# Patient Record
Sex: MALE | Race: WHITE | NOT HISPANIC OR LATINO | Employment: OTHER | URBAN - METROPOLITAN AREA
[De-identification: names, ages, dates, MRNs, and addresses within clinical notes are randomized per-mention and may not be internally consistent; named-entity substitution may affect disease eponyms.]

---

## 2020-01-10 ENCOUNTER — APPOINTMENT (OUTPATIENT)
Dept: GENERAL RADIOLOGY | Facility: HOSPITAL | Age: 45
End: 2020-01-10

## 2020-01-10 ENCOUNTER — TELEPHONE (OUTPATIENT)
Dept: ENDOCRINOLOGY | Facility: CLINIC | Age: 45
End: 2020-01-10

## 2020-01-10 ENCOUNTER — HOSPITAL ENCOUNTER (EMERGENCY)
Facility: HOSPITAL | Age: 45
Discharge: HOME | End: 2020-01-10
Attending: EMERGENCY MEDICINE | Admitting: EMERGENCY MEDICINE

## 2020-01-10 VITALS
DIASTOLIC BLOOD PRESSURE: 90 MMHG | WEIGHT: 208.34 LBS | BODY MASS INDEX: 28.22 KG/M2 | SYSTOLIC BLOOD PRESSURE: 128 MMHG | TEMPERATURE: 97.8 F | HEIGHT: 72 IN | HEART RATE: 67 BPM | OXYGEN SATURATION: 98 % | RESPIRATION RATE: 16 BRPM

## 2020-01-10 DIAGNOSIS — M25.512 BILATERAL SHOULDER PAIN, UNSPECIFIED CHRONICITY: ICD-10-CM

## 2020-01-10 DIAGNOSIS — M25.511 BILATERAL SHOULDER PAIN, UNSPECIFIED CHRONICITY: ICD-10-CM

## 2020-01-10 DIAGNOSIS — E11.65 UNCONTROLLED TYPE 2 DIABETES MELLITUS WITH HYPERGLYCEMIA (HCC): Primary | ICD-10-CM

## 2020-01-10 LAB
ALBUMIN SERPL-MCNC: 4.4 G/DL (ref 3.5–5.2)
ALBUMIN/GLOB SERPL: 1.5 G/DL
ALP SERPL-CCNC: 93 U/L (ref 39–117)
ALT SERPL W P-5'-P-CCNC: 27 U/L (ref 1–41)
ANION GAP SERPL CALCULATED.3IONS-SCNC: 15 MMOL/L (ref 5–15)
AST SERPL-CCNC: 16 U/L (ref 1–40)
BASOPHILS # BLD AUTO: 0.1 10*3/MM3 (ref 0–0.2)
BASOPHILS NFR BLD AUTO: 0.8 % (ref 0–1.5)
BILIRUB SERPL-MCNC: 0.4 MG/DL (ref 0.2–1.2)
BUN BLD-MCNC: 13 MG/DL (ref 6–20)
BUN/CREAT SERPL: 15.7 (ref 7–25)
CALCIUM SPEC-SCNC: 9 MG/DL (ref 8.6–10.5)
CHLORIDE SERPL-SCNC: 97 MMOL/L (ref 98–107)
CO2 SERPL-SCNC: 25 MMOL/L (ref 22–29)
CREAT BLD-MCNC: 0.83 MG/DL (ref 0.76–1.27)
D DIMER PPP FEU-MCNC: <0.17 MCGFEU/ML (ref 0.17–0.59)
DEPRECATED RDW RBC AUTO: 38.5 FL (ref 37–54)
EOSINOPHIL # BLD AUTO: 0.2 10*3/MM3 (ref 0–0.4)
EOSINOPHIL NFR BLD AUTO: 2.9 % (ref 0.3–6.2)
ERYTHROCYTE [DISTWIDTH] IN BLOOD BY AUTOMATED COUNT: 12.5 % (ref 12.3–15.4)
ERYTHROCYTE [SEDIMENTATION RATE] IN BLOOD: 5 MM/HR (ref 0–15)
GFR SERPL CREATININE-BSD FRML MDRD: 100 ML/MIN/1.73
GLOBULIN UR ELPH-MCNC: 2.9 GM/DL
GLUCOSE BLD-MCNC: 467 MG/DL (ref 65–99)
HCT VFR BLD AUTO: 44.4 % (ref 37.5–51)
HGB BLD-MCNC: 15.8 G/DL (ref 13–17.7)
LYMPHOCYTES # BLD AUTO: 2.5 10*3/MM3 (ref 0.7–3.1)
LYMPHOCYTES NFR BLD AUTO: 38 % (ref 19.6–45.3)
MCH RBC QN AUTO: 31.5 PG (ref 26.6–33)
MCHC RBC AUTO-ENTMCNC: 35.5 G/DL (ref 31.5–35.7)
MCV RBC AUTO: 88.7 FL (ref 79–97)
MONOCYTES # BLD AUTO: 0.5 10*3/MM3 (ref 0.1–0.9)
MONOCYTES NFR BLD AUTO: 7.3 % (ref 5–12)
NEUTROPHILS # BLD AUTO: 3.3 10*3/MM3 (ref 1.7–7)
NEUTROPHILS NFR BLD AUTO: 51 % (ref 42.7–76)
NRBC BLD AUTO-RTO: 0.1 /100 WBC (ref 0–0.2)
PLATELET # BLD AUTO: 171 10*3/MM3 (ref 140–450)
PMV BLD AUTO: 8.2 FL (ref 6–12)
POTASSIUM BLD-SCNC: 4 MMOL/L (ref 3.5–5.2)
PROT SERPL-MCNC: 7.3 G/DL (ref 6–8.5)
RBC # BLD AUTO: 5 10*6/MM3 (ref 4.14–5.8)
SODIUM BLD-SCNC: 137 MMOL/L (ref 136–145)
TROPONIN T SERPL-MCNC: <0.01 NG/ML (ref 0–0.03)
WBC NRBC COR # BLD: 6.5 10*3/MM3 (ref 3.4–10.8)

## 2020-01-10 PROCEDURE — 99283 EMERGENCY DEPT VISIT LOW MDM: CPT

## 2020-01-10 PROCEDURE — 85652 RBC SED RATE AUTOMATED: CPT | Performed by: EMERGENCY MEDICINE

## 2020-01-10 PROCEDURE — 93005 ELECTROCARDIOGRAM TRACING: CPT | Performed by: EMERGENCY MEDICINE

## 2020-01-10 PROCEDURE — 84484 ASSAY OF TROPONIN QUANT: CPT | Performed by: EMERGENCY MEDICINE

## 2020-01-10 PROCEDURE — 85379 FIBRIN DEGRADATION QUANT: CPT | Performed by: EMERGENCY MEDICINE

## 2020-01-10 PROCEDURE — 85025 COMPLETE CBC W/AUTO DIFF WBC: CPT | Performed by: EMERGENCY MEDICINE

## 2020-01-10 PROCEDURE — 71046 X-RAY EXAM CHEST 2 VIEWS: CPT

## 2020-01-10 PROCEDURE — 80053 COMPREHEN METABOLIC PANEL: CPT | Performed by: EMERGENCY MEDICINE

## 2020-01-10 RX ADMIN — SODIUM CHLORIDE 1000 ML: 900 INJECTION, SOLUTION INTRAVENOUS at 10:50

## 2020-01-10 NOTE — TELEPHONE ENCOUNTER
PATIENT LEFT A VM THAT HE NEEDED TO SCHEDULE AN APPOINTMENT. CHECKED THE RECORDS AND HE ISN'T A CURRENT PATIENT. HOWEVER HE WAS IN THE ER AND DISCHARGE SUMMARY INDICATED TO SCHEDULE A 1WK F/U WITH DR. LUJAN. SO DR. LUJAN DID SEE HIM IN THE ER. I CALLED PATIENT BACK AND LEFT A VM TO CALL AND WE'LL GET HIM SCHEDULED.

## 2020-01-10 NOTE — ED PROVIDER NOTES
Subjective   History of Present Illness  Context: 44-year-old male presents with shoulder pain.  He states he has had this for a couple of months.  He complains that he had some pain in his chest for a couple of days.  He has had no nausea vomiting.  He states he has been more fatigued.  He states he urinates frequently has to get up several times during the night.  He also has had cough with some sputum production.  He has had no fever.  No diaphoresis.  Location: Chest and shoulders  Quality:  Duration: Chest 2 days shoulders 2 months  Timing: Constant  Severity: 4 out of 10   Modifying factors: None reported  Associated signs and symptoms: Frequent urination    Review of Systems  Negative for fever headache dizziness nausea vomiting diarrhea diaphoresis positive for cough with sputum production.  Polyuria, reports he does have some pain in his feet at time  No past medical history on file.  States prediabetic.  Hypertension  No Known Allergies    No past surgical history on file.    No family history on file.    Social History     Socioeconomic History   • Marital status:      Spouse name: Not on file   • Number of children: Not on file   • Years of education: Not on file   • Highest education level: Not on file     Patient had been on lisinopril and metformin in the past but no medications currently      Objective   Physical Exam  44-year-old male awake alert.  Generally well-developed well-nourished.  Pupils equal round to light.  Oropharynx clear mucous membranes moist neck supple chest clear equal breath sounds.  Cardiovascular regular rate and rhythm abdomen soft nontender.  Examination extremities he has full range of motion without complaints of pain.  He has no shoulder discomfort with movement.  Neuro exam without focal findings noted.  Skin without rash  Procedures           ED Course      Results for orders placed or performed during the hospital encounter of 01/10/20   Comprehensive Metabolic  Panel   Result Value Ref Range    Glucose 467 (C) 65 - 99 mg/dL    BUN 13 6 - 20 mg/dL    Creatinine 0.83 0.76 - 1.27 mg/dL    Sodium 137 136 - 145 mmol/L    Potassium 4.0 3.5 - 5.2 mmol/L    Chloride 97 (L) 98 - 107 mmol/L    CO2 25.0 22.0 - 29.0 mmol/L    Calcium 9.0 8.6 - 10.5 mg/dL    Total Protein 7.3 6.0 - 8.5 g/dL    Albumin 4.40 3.50 - 5.20 g/dL    ALT (SGPT) 27 1 - 41 U/L    AST (SGOT) 16 1 - 40 U/L    Alkaline Phosphatase 93 39 - 117 U/L    Total Bilirubin 0.4 0.2 - 1.2 mg/dL    eGFR Non African Amer 100 >60 mL/min/1.73    Globulin 2.9 gm/dL    A/G Ratio 1.5 g/dL    BUN/Creatinine Ratio 15.7 7.0 - 25.0    Anion Gap 15.0 5.0 - 15.0 mmol/L   D-dimer, Quantitative   Result Value Ref Range    D-Dimer, Quantitative <0.17 (L) 0.17 - 0.59 MCGFEU/mL   Troponin   Result Value Ref Range    Troponin T <0.010 0.000 - 0.030 ng/mL   Sedimentation Rate   Result Value Ref Range    Sed Rate 5 0 - 15 mm/hr   CBC Auto Differential   Result Value Ref Range    WBC 6.50 3.40 - 10.80 10*3/mm3    RBC 5.00 4.14 - 5.80 10*6/mm3    Hemoglobin 15.8 13.0 - 17.7 g/dL    Hematocrit 44.4 37.5 - 51.0 %    MCV 88.7 79.0 - 97.0 fL    MCH 31.5 26.6 - 33.0 pg    MCHC 35.5 31.5 - 35.7 g/dL    RDW 12.5 12.3 - 15.4 %    RDW-SD 38.5 37.0 - 54.0 fl    MPV 8.2 6.0 - 12.0 fL    Platelets 171 140 - 450 10*3/mm3    Neutrophil % 51.0 42.7 - 76.0 %    Lymphocyte % 38.0 19.6 - 45.3 %    Monocyte % 7.3 5.0 - 12.0 %    Eosinophil % 2.9 0.3 - 6.2 %    Basophil % 0.8 0.0 - 1.5 %    Neutrophils, Absolute 3.30 1.70 - 7.00 10*3/mm3    Lymphocytes, Absolute 2.50 0.70 - 3.10 10*3/mm3    Monocytes, Absolute 0.50 0.10 - 0.90 10*3/mm3    Eosinophils, Absolute 0.20 0.00 - 0.40 10*3/mm3    Basophils, Absolute 0.10 0.00 - 0.20 10*3/mm3    nRBC 0.1 0.0 - 0.2 /100 WBC     Xr Chest 2 View    Result Date: 1/10/2020  No acute cardiopulmonary findings.  Electronically Signed By-Clayton Corbett On:1/10/2020 10:02 AM This report was finalized on [id number] by  Clayton Corbett,  ".    Medications   sodium chloride 0.9 % bolus 1,000 mL (1,000 mL Intravenous New Bag 1/10/20 1050)     /91   Pulse 81   Temp 97.7 °F (36.5 °C) (Oral)   Resp 17   Ht 182.9 cm (72\")   Wt 94.5 kg (208 lb 5.4 oz)   SpO2 97%   BMI 28.26 kg/m²                                            MDM  Chart review: No recent visits.  He had been on lisinopril several years ago  Comorbidity: As per past history  Differential: Musculoskeletal pain, cardiac etiology unlikely due to chronicity of pain.  Pneumonia, bronchitis  My EKG interpretation: Sinus rhythm without acute abnormality  Lab: Negative d-dimer negative troponin normal sed rate normal CBC compress metabolic panel essentially normal with exception of a glucose of 467, BUN 13 bicarb 25  Radiology: I reviewed chest x-ray.  No acute cardiopulmonary abnormality noted  Discussion/treatment: Patient was given a liter of IV saline.  He was discussed with Dr. Malcolm who recommended starting him on metformin 500 twice a day.  They can follow him up next week in the office.    Final diagnoses:   Uncontrolled type 2 diabetes mellitus with hyperglycemia (CMS/[facility])   Bilateral shoulder pain, unspecified chronicity            Reuben Pacheco MD  01/10/20 1113       Reuben Pacheco MD  01/10/20 1117    "

## 2020-01-16 PROBLEM — M19.90 ARTHRITIS: Status: ACTIVE | Noted: 2020-01-16

## 2020-01-16 PROBLEM — I10 HYPERTENSION: Status: ACTIVE | Noted: 2020-01-16

## 2020-01-16 RX ORDER — LISINOPRIL 20 MG/1
TABLET ORAL
COMMUNITY
Start: 2012-08-28 | End: 2020-01-30

## 2020-01-16 RX ORDER — MELOXICAM 15 MG/1
TABLET ORAL
COMMUNITY
Start: 2012-08-28 | End: 2020-01-30

## 2020-01-16 RX ORDER — TRAMADOL HYDROCHLORIDE 50 MG/1
TABLET ORAL
COMMUNITY
Start: 2012-11-26 | End: 2020-01-30

## 2020-01-30 ENCOUNTER — OFFICE VISIT (OUTPATIENT)
Dept: ENDOCRINOLOGY | Facility: CLINIC | Age: 45
End: 2020-01-30

## 2020-01-30 VITALS
HEIGHT: 72 IN | WEIGHT: 210 LBS | DIASTOLIC BLOOD PRESSURE: 70 MMHG | SYSTOLIC BLOOD PRESSURE: 115 MMHG | BODY MASS INDEX: 28.44 KG/M2 | OXYGEN SATURATION: 98 % | HEART RATE: 79 BPM

## 2020-01-30 DIAGNOSIS — E78.2 MIXED HYPERLIPIDEMIA: ICD-10-CM

## 2020-01-30 DIAGNOSIS — I10 ESSENTIAL HYPERTENSION: ICD-10-CM

## 2020-01-30 DIAGNOSIS — E11.42 DIABETIC PERIPHERAL NEUROPATHY (HCC): ICD-10-CM

## 2020-01-30 DIAGNOSIS — E11.65 UNCONTROLLED TYPE 2 DIABETES MELLITUS WITH HYPERGLYCEMIA (HCC): Primary | ICD-10-CM

## 2020-01-30 PROCEDURE — 99204 OFFICE O/P NEW MOD 45 MIN: CPT | Performed by: INTERNAL MEDICINE

## 2020-01-30 RX ORDER — ERGOCALCIFEROL (VITAMIN D2) 50 MCG
2000 CAPSULE ORAL DAILY
Qty: 100 CAPSULE | Refills: 4 | Status: ON HOLD | OUTPATIENT
Start: 2020-01-30 | End: 2023-02-06 | Stop reason: SDUPTHER

## 2020-01-30 RX ORDER — LOSARTAN POTASSIUM 25 MG/1
25 TABLET ORAL DAILY
COMMUNITY
Start: 2020-01-14 | End: 2022-02-22 | Stop reason: SDUPTHER

## 2020-01-30 RX ORDER — ATORVASTATIN CALCIUM 20 MG/1
20 TABLET, FILM COATED ORAL NIGHTLY
COMMUNITY
Start: 2020-01-14 | End: 2022-02-24 | Stop reason: SDUPTHER

## 2020-01-30 RX ORDER — INSULIN GLARGINE 100 [IU]/ML
20 INJECTION, SOLUTION SUBCUTANEOUS NIGHTLY
COMMUNITY
Start: 2020-01-14 | End: 2022-02-22 | Stop reason: ALTCHOICE

## 2020-01-30 RX ORDER — MAGNESIUM 200 MG
1000 TABLET ORAL DAILY
Qty: 100 EACH | Refills: 4 | Status: SHIPPED | OUTPATIENT
Start: 2020-01-30 | End: 2022-02-22

## 2020-01-30 RX ORDER — FLURBIPROFEN SODIUM 0.3 MG/ML
SOLUTION/ DROPS OPHTHALMIC
COMMUNITY
Start: 2020-01-14 | End: 2023-02-22 | Stop reason: SDUPTHER

## 2020-01-30 NOTE — PATIENT INSTRUCTIONS
JUDITH metformin  Start Janumet XR 50/1000, 2 tablets p.o. daily  Continue Lantus 20 units daily at bedtime  Check blood sugars at least once a day in the morning and send me the records for review in the next couple weeks  Always keep glucose source with you in case of low blood sugar  Start vitamin D 2000 units p.o. daily  Start vitamin B12 1000 mcg sublingual daily  Arrange for comprehensive diabetes [place]  Follow-up in 3 months with labs  Get regular eye exam and flu vaccine.

## 2020-01-30 NOTE — PROGRESS NOTES
Endocrine Consult Outpatient  Referred from the emergency room for uncontrolled diabetes  Patient Care Team:  Thaddeus Dorman MD as PCP - General     Chief Complaint: Uncontrolled diabetes  HPI: 44-year-old male with history of hypertension hyperlipidemia and prediabetes while he went to the emergency room because he was feeling bad he dropped her kids to school and he thought he was going to die and then ended up in the emergency room was found to have hyperglycemia with blood sugar about 467.  His rest of the labs were fine.  He was started on metformin and Lantus and was discharged and referred here for further evaluation management.  He just started checking his blood sugar yesterday, he still have some fatigue and tiredness.  He tells me for some reason meat smells better now.  Hypertension: Well-controlled  Hyperlipidemia: On atorvastatin.  Diabetic complications: Probably have diabetic neuropathy he has numbness and pain in the feet.    Past Medical History:   Diagnosis Date   • Hyperlipidemia    • Hypertension    • Type 2 diabetes mellitus (CMS/[facility])        Social History     Socioeconomic History   • Marital status:      Spouse name: Not on file   • Number of children: Not on file   • Years of education: Not on file   • Highest education level: Not on file   Tobacco Use   • Smoking status: Never Smoker   Substance and Sexual Activity   • Alcohol use: Never     Frequency: Never       Family History   Problem Relation Age of Onset   • Diabetes Maternal Grandmother    • Hypertension Maternal Grandmother    • Hyperlipidemia Maternal Grandmother    • Cancer Maternal Grandfather        No Known Allergies    ROS:   Constitutional:  Admit fatigue, tiredness.    Eyes:  Denies change in visual acuity   HENT:  Denies nasal congestion or sore throat   Respiratory: denies cough, admit shortness of breath.   Cardiovascular:  denies chest pain, edema   GI:  Denies abdominal pain, nausea, vomiting.   :  Denies  dysuria   Musculoskeletal:  Denies back pain or joint pain   Integument:  Denies dry skin, rash   Neurologic:  Denies headache, focal weakness or sensory changes   Endocrine:  Admit polyuria and polydipsia   Psychiatric:  Denies depression or anxiety      Vitals:    01/30/20 1339   BP: 115/70   Pulse: 79   SpO2: 98%        Physical Exam:  GEN: NAD, conversant  EYES: EOMI, PERRL, no conjunctival erythema  NECK: no thyromegaly, full ROM   CV: RRR, no murmurs/rubs/gallops, no peripheral edema  LUNG: CTAB, no wheezes/rales/ronchi  SKIN: no rashes, no acanthosis  MSK: no deformities, full ROM of all extremities  NEURO: no tremors, DTR normal  PSYCH: AOX3, appropriate mood, affect normal  FEET: No ulcer or callus seen, good dorsal pedis pulses on both feet and monofilament test with 10 g monofilament was intact.  Results Review:     I reviewed the patient's new clinical results.    Lab Results   Component Value Date    GLUCOSE 467 (C) 01/10/2020    BUN 13 01/10/2020    CREATININE 0.83 01/10/2020    EGFRIFNONA 100 01/10/2020    BCR 15.7 01/10/2020    K 4.0 01/10/2020    CO2 25.0 01/10/2020    CALCIUM 9.0 01/10/2020    ALBUMIN 4.40 01/10/2020    AST 16 01/10/2020    ALT 27 01/10/2020       Lab Results   Component Value Date    CREATININE 0.83 01/10/2020     Labs from January 13, 2020 showed a sodium 140, potassium 4.6, chloride 102, CO2 23, glucose 218, BUN 15, creatinine 0.6, AST 14, ALT 26, white count 6.9, hemoglobin 16, hematocrit 46.6, platelet count 187, A1c 11.3, , triglycerides 146 and TSH 1.54.  Medication Review: Reviewed.       Current Outpatient Medications:   •  atorvastatin (LIPITOR) 20 MG tablet, , Disp: , Rfl:   •  B-D UF III MINI PEN NEEDLES 31G X 5 MM misc, , Disp: , Rfl:   •  LANTUS SOLOSTAR 100 UNIT/ML injection pen, 20 Units Every Night., Disp: , Rfl:   •  losartan (COZAAR) 25 MG tablet, , Disp: , Rfl:   •  metFORMIN (GLUCOPHAGE) 500 MG tablet, Take 1 tablet by mouth 2 (Two) Times a Day With  Meals., Disp: 60 tablet, Rfl: 0    Assessment/Plan   1.  Diabetes mellitus type 2: Uncontrolled with significant hyperglycemia.  2.  Hypertension: Well-controlled  3.  Hyperlipidemia: On atorvastatin  4.  Diabetic neuropathy: Uncontrolled    Plan:  At this time I will change metformin to Janumet XR 50/1000, 2 tablets daily and continue Lantus 20 units at night, advised him to check blood sugar daily in the morning at least and send me the records for review next 1 to 2 weeks.  Also I will refer him for comprehensive diabetes education.  He also needs to get an eye exam and regular flu vaccine.    For diabetic neuropathy and will put him on vitamin D and B12 supplementation for now.  We will see him clinically if not better we will consider adding more medications.    For hypertension/hyperlipidemia I will continue losartan and atorvastatin respectively.  Will follow lipid panel along with CMP.             Torito Malcolm MD FACE.      Much of the above report is an electronic transcription/translation of the spoken language to printed text using Dragon Software. As such, the subtleties and finesse of the spoken language may permit erroneous, or at times, nonsensical words or phrases to be inadvertently transcribed; thus changes may be made at a later date to rectify these errors.

## 2020-02-18 ENCOUNTER — OFFICE VISIT (OUTPATIENT)
Dept: ENDOCRINOLOGY | Facility: CLINIC | Age: 45
End: 2020-02-18

## 2020-02-18 DIAGNOSIS — Z79.4 TYPE 2 DIABETES MELLITUS WITH HYPERGLYCEMIA, WITH LONG-TERM CURRENT USE OF INSULIN (HCC): Primary | ICD-10-CM

## 2020-02-18 DIAGNOSIS — E11.65 TYPE 2 DIABETES MELLITUS WITH HYPERGLYCEMIA, WITH LONG-TERM CURRENT USE OF INSULIN (HCC): Primary | ICD-10-CM

## 2020-02-18 PROCEDURE — G0108 DIAB MANAGE TRN  PER INDIV: HCPCS | Performed by: INTERNAL MEDICINE

## 2020-02-18 NOTE — PROGRESS NOTES
Pt seen for Initial Diabetes Education. Seen 9:05 to 10:05am  Pt had been pre diabetic however after eating candy in December Bg shot up to high 400's and he was in ER

## 2020-05-07 ENCOUNTER — TELEPHONE (OUTPATIENT)
Dept: ENDOCRINOLOGY | Facility: CLINIC | Age: 45
End: 2020-05-07

## 2020-06-29 RX ORDER — SITAGLIPTIN AND METFORMIN HYDROCHLORIDE 1000; 50 MG/1; MG/1
TABLET, FILM COATED, EXTENDED RELEASE ORAL
Qty: 60 TABLET | Refills: 3 | Status: SHIPPED | OUTPATIENT
Start: 2020-06-29 | End: 2020-08-26 | Stop reason: SDUPTHER

## 2020-09-16 RX ORDER — INSULIN DETEMIR 100 [IU]/ML
20 INJECTION, SOLUTION SUBCUTANEOUS DAILY
COMMUNITY
Start: 2020-07-27 | End: 2022-02-22 | Stop reason: SDUPTHER

## 2020-10-23 ENCOUNTER — TRANSCRIBE ORDERS (OUTPATIENT)
Dept: ADMINISTRATIVE | Facility: HOSPITAL | Age: 45
End: 2020-10-23

## 2020-10-23 ENCOUNTER — HOSPITAL ENCOUNTER (OUTPATIENT)
Dept: CARDIOLOGY | Facility: HOSPITAL | Age: 45
Discharge: HOME | End: 2020-10-23
Admitting: INTERNAL MEDICINE

## 2020-10-23 DIAGNOSIS — Z01.810 PREOP CARDIOVASCULAR EXAM: ICD-10-CM

## 2020-10-23 DIAGNOSIS — Z01.810 PREOP CARDIOVASCULAR EXAM: Primary | ICD-10-CM

## 2020-10-23 PROCEDURE — 93005 ELECTROCARDIOGRAM TRACING: CPT | Performed by: INTERNAL MEDICINE

## 2020-10-23 PROCEDURE — 93010 ELECTROCARDIOGRAM REPORT: CPT | Performed by: INTERNAL MEDICINE

## 2021-03-02 RX ORDER — SITAGLIPTIN AND METFORMIN HYDROCHLORIDE 1000; 50 MG/1; MG/1
TABLET, FILM COATED, EXTENDED RELEASE ORAL
Qty: 60 TABLET | Refills: 3 | OUTPATIENT
Start: 2021-03-02

## 2022-02-03 ENCOUNTER — APPOINTMENT (OUTPATIENT)
Dept: GENERAL RADIOLOGY | Facility: HOSPITAL | Age: 47
End: 2022-02-03

## 2022-02-03 ENCOUNTER — HOSPITAL ENCOUNTER (EMERGENCY)
Facility: HOSPITAL | Age: 47
Discharge: LEFT BEFORE DISCHARGE | End: 2022-02-03
Attending: EMERGENCY MEDICINE | Admitting: EMERGENCY MEDICINE

## 2022-02-03 VITALS
RESPIRATION RATE: 20 BRPM | HEART RATE: 116 BPM | TEMPERATURE: 98.1 F | OXYGEN SATURATION: 100 % | HEIGHT: 72 IN | DIASTOLIC BLOOD PRESSURE: 98 MMHG | BODY MASS INDEX: 29.32 KG/M2 | SYSTOLIC BLOOD PRESSURE: 157 MMHG | WEIGHT: 216.49 LBS

## 2022-02-03 DIAGNOSIS — U07.1 COVID: ICD-10-CM

## 2022-02-03 DIAGNOSIS — R06.00 DYSPNEA, UNSPECIFIED TYPE: Primary | ICD-10-CM

## 2022-02-03 LAB
ALBUMIN SERPL-MCNC: 5.1 G/DL (ref 3.5–5.2)
ALBUMIN/GLOB SERPL: 1.8 G/DL
ALP SERPL-CCNC: 71 U/L (ref 39–117)
ALT SERPL W P-5'-P-CCNC: 43 U/L (ref 1–41)
ANION GAP SERPL CALCULATED.3IONS-SCNC: 17 MMOL/L (ref 5–15)
AST SERPL-CCNC: 24 U/L (ref 1–40)
BILIRUB SERPL-MCNC: 0.6 MG/DL (ref 0–1.2)
BUN SERPL-MCNC: 15 MG/DL (ref 6–20)
BUN/CREAT SERPL: 15.5 (ref 7–25)
CALCIUM SPEC-SCNC: 10.3 MG/DL (ref 8.6–10.5)
CHLORIDE SERPL-SCNC: 99 MMOL/L (ref 98–107)
CO2 SERPL-SCNC: 22 MMOL/L (ref 22–29)
CREAT SERPL-MCNC: 0.97 MG/DL (ref 0.76–1.27)
D DIMER PPP FEU-MCNC: 0.19 MG/L (FEU) (ref 0–0.59)
D-LACTATE SERPL-SCNC: 1.9 MMOL/L (ref 0.5–2)
DEPRECATED RDW RBC AUTO: 38.9 FL (ref 37–54)
EOSINOPHIL # BLD MANUAL: 0.14 10*3/MM3 (ref 0–0.4)
EOSINOPHIL NFR BLD MANUAL: 2 % (ref 0.3–6.2)
ERYTHROCYTE [DISTWIDTH] IN BLOOD BY AUTOMATED COUNT: 13 % (ref 12.3–15.4)
GFR SERPL CREATININE-BSD FRML MDRD: 83 ML/MIN/1.73
GLOBULIN UR ELPH-MCNC: 2.9 GM/DL
GLUCOSE SERPL-MCNC: 166 MG/DL (ref 65–99)
HCT VFR BLD AUTO: 48.1 % (ref 37.5–51)
HGB BLD-MCNC: 17.5 G/DL (ref 13–17.7)
LYMPHOCYTES # BLD MANUAL: 3.38 10*3/MM3 (ref 0.7–3.1)
LYMPHOCYTES NFR BLD MANUAL: 8 % (ref 5–12)
MCH RBC QN AUTO: 31.2 PG (ref 26.6–33)
MCHC RBC AUTO-ENTMCNC: 36.3 G/DL (ref 31.5–35.7)
MCV RBC AUTO: 85.8 FL (ref 79–97)
MONOCYTES # BLD: 0.58 10*3/MM3 (ref 0.1–0.9)
NEUTROPHILS # BLD AUTO: 3.1 10*3/MM3 (ref 1.7–7)
NEUTROPHILS NFR BLD MANUAL: 43 % (ref 42.7–76)
NT-PROBNP SERPL-MCNC: 6.4 PG/ML (ref 0–450)
PLAT MORPH BLD: NORMAL
PLATELET # BLD AUTO: 241 10*3/MM3 (ref 140–450)
PMV BLD AUTO: 7.7 FL (ref 6–12)
POTASSIUM SERPL-SCNC: 4 MMOL/L (ref 3.5–5.2)
PROT SERPL-MCNC: 8 G/DL (ref 6–8.5)
RBC # BLD AUTO: 5.61 10*6/MM3 (ref 4.14–5.8)
RBC MORPH BLD: NORMAL
SARS-COV-2 RNA PNL SPEC NAA+PROBE: DETECTED
SODIUM SERPL-SCNC: 138 MMOL/L (ref 136–145)
TROPONIN T SERPL-MCNC: <0.01 NG/ML (ref 0–0.03)
VARIANT LYMPHS NFR BLD MANUAL: 41 % (ref 19.6–45.3)
VARIANT LYMPHS NFR BLD MANUAL: 6 % (ref 0–5)
WBC MORPH BLD: NORMAL
WBC NRBC COR # BLD: 7.2 10*3/MM3 (ref 3.4–10.8)
WHOLE BLOOD HOLD SPECIMEN: NORMAL
WHOLE BLOOD HOLD SPECIMEN: NORMAL

## 2022-02-03 PROCEDURE — 83605 ASSAY OF LACTIC ACID: CPT

## 2022-02-03 PROCEDURE — 71045 X-RAY EXAM CHEST 1 VIEW: CPT

## 2022-02-03 PROCEDURE — 84484 ASSAY OF TROPONIN QUANT: CPT | Performed by: PHYSICIAN ASSISTANT

## 2022-02-03 PROCEDURE — 85007 BL SMEAR W/DIFF WBC COUNT: CPT | Performed by: EMERGENCY MEDICINE

## 2022-02-03 PROCEDURE — 85379 FIBRIN DEGRADATION QUANT: CPT | Performed by: PHYSICIAN ASSISTANT

## 2022-02-03 PROCEDURE — 93005 ELECTROCARDIOGRAM TRACING: CPT

## 2022-02-03 PROCEDURE — 99283 EMERGENCY DEPT VISIT LOW MDM: CPT

## 2022-02-03 PROCEDURE — 93005 ELECTROCARDIOGRAM TRACING: CPT | Performed by: EMERGENCY MEDICINE

## 2022-02-03 PROCEDURE — 83880 ASSAY OF NATRIURETIC PEPTIDE: CPT | Performed by: PHYSICIAN ASSISTANT

## 2022-02-03 PROCEDURE — 87040 BLOOD CULTURE FOR BACTERIA: CPT | Performed by: EMERGENCY MEDICINE

## 2022-02-03 PROCEDURE — 85025 COMPLETE CBC W/AUTO DIFF WBC: CPT | Performed by: EMERGENCY MEDICINE

## 2022-02-03 PROCEDURE — 80053 COMPREHEN METABOLIC PANEL: CPT | Performed by: EMERGENCY MEDICINE

## 2022-02-03 PROCEDURE — 87635 SARS-COV-2 COVID-19 AMP PRB: CPT | Performed by: PHYSICIAN ASSISTANT

## 2022-02-03 RX ORDER — SODIUM CHLORIDE 0.9 % (FLUSH) 0.9 %
10 SYRINGE (ML) INJECTION AS NEEDED
Status: DISCONTINUED | OUTPATIENT
Start: 2022-02-03 | End: 2022-02-03 | Stop reason: HOSPADM

## 2022-02-03 NOTE — ED PROVIDER NOTES
"Subjective   Chief Complaint: Shortness of breath    Patient is a 46-year-old  male history of hyperlipidemia, hypertension, diabetes presents the ER with complaints of shortness of breath \"for 5 years, but worse in the last few months\".  Patient states that he has been seen at [facility] multiple times for ER evaluations, also states that he had a stress test and echo performed yesterday but continues to state \"I know something is wrong you need to figure out what is wrong\".  Patient states that he feels like he cannot take a deep breath and he feels short of breath over time.  He denies any chest pain or palpitations.  Patient does report chest tightness that he says is from the shortness of breath.  Patient also reports productive cough with intermittent black sputum.  Denies hemoptysis.  He denies any sore throat headache or fever.  He does report some intermittent lightheadedness.  He denies abdominal pain, nausea vomiting or diarrhea.  He denies any lower extremity swelling.  Patient appears extremely anxious, keeps stating \"no one believes me, no one will figure out what is wrong\".  Patient appears slightly diaphoretic but also tearful when he is speaking.  Patient denies history of anxiety or depression.  Denies SI or HI.  When asked if patient had received the COVID or influenza vaccine patient states \"no, and I never fucking will\".    PCP: Thaddeus Dorman      History provided by:  Patient      Review of Systems   Constitutional: Negative for chills and fever.   HENT: Negative for congestion, sore throat and trouble swallowing.    Respiratory: Positive for chest tightness and shortness of breath. Negative for wheezing.    Cardiovascular: Negative for chest pain.   Gastrointestinal: Negative for abdominal pain, diarrhea, nausea and vomiting.   Genitourinary: Negative for dysuria.   Musculoskeletal: Negative for myalgias.   Skin: Negative for rash.   Neurological: Negative for dizziness, " weakness and headaches.   Psychiatric/Behavioral: Negative for behavioral problems. The patient is nervous/anxious.    All other systems reviewed and are negative.      Past Medical History:   Diagnosis Date   • Hyperlipidemia    • Hypertension    • Type 2 diabetes mellitus (HCC)        No Known Allergies    History reviewed. No pertinent surgical history.    Family History   Problem Relation Age of Onset   • Diabetes Maternal Grandmother    • Hypertension Maternal Grandmother    • Hyperlipidemia Maternal Grandmother    • Cancer Maternal Grandfather        Social History     Socioeconomic History   • Marital status:    Tobacco Use   • Smoking status: Former Smoker   • Smokeless tobacco: Never Used   Substance and Sexual Activity   • Alcohol use: Never   • Drug use: Never           Objective   Physical Exam  Vitals and nursing note reviewed.   Constitutional:       General: He is not in acute distress.     Appearance: He is well-developed and normal weight. He is diaphoretic. He is not ill-appearing.   HENT:      Head: Normocephalic and atraumatic.      Mouth/Throat:      Mouth: Mucous membranes are moist.      Pharynx: No oropharyngeal exudate.   Eyes:      Extraocular Movements: Extraocular movements intact.      Pupils: Pupils are equal, round, and reactive to light.   Cardiovascular:      Rate and Rhythm: Regular rhythm. Tachycardia present.      Heart sounds: No murmur heard.      Pulmonary:      Effort: Pulmonary effort is normal. Tachypnea present.      Breath sounds: No decreased breath sounds, wheezing, rhonchi or rales.   Chest:      Chest wall: No tenderness.   Abdominal:      Palpations: Abdomen is soft.      Tenderness: There is no abdominal tenderness.   Musculoskeletal:      Cervical back: Normal range of motion.      Right lower leg: No edema.      Left lower leg: No edema.   Skin:     General: Skin is warm.      Findings: No ecchymosis.   Neurological:      General: No focal deficit present.  "     Mental Status: He is alert and oriented to person, place, and time.      Cranial Nerves: No cranial nerve deficit.   Psychiatric:         Attention and Perception: Attention normal.         Mood and Affect: Mood is anxious.         Behavior: Behavior normal.         ECG 12 Lead      Date/Time: 2/3/2022 3:04 PM  Performed by: Jacquelyn Alvarado PA  Authorized by: J Carlos Dixon MD   Interpreted by physician  Previous ECG: no previous ECG available  Rhythm: sinus tachycardia  Rate: tachycardic  BPM: 122  QRS axis: normal  Conduction: conduction normal  ST Segments: ST segments normal  Clinical impression: non-specific ECG                 ED Course  ED Course as of 02/03/22 1603   Thu Feb 03, 2022   1508 Records requested from [facility] [MM]   1550 I answered patient's call light with ER RN, Kristin Dias, patient started yelling and cursing at me asking why I have not figured out what is wrong with him.  I explained patient's lab work that has returned thus far, normal BMP, D-dimer, troponin.  Normal chest x-ray.  I did advise patient that he is Covid positive.  Patient became more aggressive, yelling, yelling at his 14-year-old son stating \"you need to start recording this right now, they are going to lock me up because I am dying\".  I advised patient that we are monitoring his heart rate and oxygen and that his oxygen is currently stable.  Patient started yelling again.  Security called. [MM]   1554 While security was at bedside, patient demanded that his 14-year-old son record everything that he saw while in the ER.  Security then expressed to the 14-year-old son that he is not allowed to record while on hospital property. [MM]   1556 Patient escorted out of the ER, accompanied by security, dragging his 14-year-old son by the arm. [MM]      ED Course User Index  [MM] Jacquelyn Alvarado PA    /98   Pulse 110   Temp 98.1 °F (36.7 °C)   Resp 20   Ht 182.9 cm (72\")   Wt 98.2 kg (216 lb 7.9 oz)   SpO2 100%  "  BMI 29.36 kg/m²   Labs Reviewed   COVID-19,CEPHEID/LESLY,COR/DUANE/PAD/JOEL IN-HOUSE,NP SWAB IN TRANSPORT MEDIA 3-4 HR TAT, RT-PCR - Abnormal; Notable for the following components:       Result Value    COVID19 Detected (*)     All other components within normal limits    Narrative:     Fact sheet for providers: https://www.fda.gov/media/058373/download     Fact sheet for patients: https://www.fda.gov/media/690175/download  Fact sheet for providers: https://www.fda.gov/media/766273/download    Fact sheet for patients: https://www.fda.gov/media/380374/download    Test performed by PCR.   COMPREHENSIVE METABOLIC PANEL - Abnormal; Notable for the following components:    Glucose 166 (*)     ALT (SGPT) 43 (*)     Anion Gap 17.0 (*)     All other components within normal limits    Narrative:     GFR Normal >60  Chronic Kidney Disease <60  Kidney Failure <15     CBC WITH AUTO DIFFERENTIAL - Abnormal; Notable for the following components:    MCHC 36.3 (*)     All other components within normal limits    Narrative:     The previously reported component NRBC is no longer being reported. Previous result was 0.1 /100 WBC (Reference Range: 0.0-0.2 /100 WBC) on 2/3/2022 at 1544 EST.   MANUAL DIFFERENTIAL - Abnormal; Notable for the following components:    Atypical Lymphocyte % 6.0 (*)     Lymphocytes Absolute 3.38 (*)     All other components within normal limits   TROPONIN (IN-HOUSE) - Normal    Narrative:     Troponin T Reference Range:  <= 0.03 ng/mL-   Negative for AMI  >0.03 ng/mL-     Abnormal for myocardial necrosis.  Clinicians would have to utilize clinical acumen, EKG, Troponin and serial changes to determine if it is an Acute Myocardial Infarction or myocardial injury due to an underlying chronic condition.       Results may be falsely decreased if patient taking Biotin.     BNP (IN-HOUSE) - Normal    Narrative:     Among patients with dyspnea, NT-proBNP is highly sensitive for the detection of acute congestive heart  failure. In addition NT-proBNP of <300 pg/ml effectively rules out acute congestive heart failure with 99% negative predictive value.    Results may be falsely decreased if patient taking Biotin.     D-DIMER, QUANTITATIVE - Normal    Narrative:     Reference Range  --------------------------------------------------------------------     < 0.50   Negative Predictive Value  0.50-0.59   Indeterminate    >= 0.60   Probable VTE             A very low percentage of patients with DVT may yield D-Dimer results   below the cut-off of 0.50 mg/L FEU.  This is known to be more   prevalent in patients with distal DVT.             Results of this test should always be interpreted in conjunction with   the patient's medical history, clinical presentation and other   findings.  Clinical diagnosis should not be based on the result of   INNOVANCE D-Dimer alone.   POC LACTATE - Normal   BLOOD CULTURE   BLOOD CULTURE   RAINBOW DRAW    Narrative:     The following orders were created for panel order [place] Draw.  Procedure                               Abnormality         Status                     ---------                               -----------         ------                     Green Top (Gel)[[id number]]                                  Final result               Lavender Top[[id number]]                                     Final result               Gold Top - SST[[id number]]                                   Final result               Light Blue Top[[id number]]                                   Final result                 Please view results for these tests on the individual orders.   URINALYSIS W/ CULTURE IF INDICATED   POC LACTATE   CBC AND DIFFERENTIAL    Narrative:     The following orders were created for panel order CBC & Differential.  Procedure                               Abnormality         Status                     ---------                               -----------         ------                     CBC Auto  "Differential[[id number]]        Abnormal            Final result               Scan Slide[[id number]]                                                                    Please view results for these tests on the individual orders.   GREEN TOP   LAVENDER TOP   GOLD TOP - SST   LIGHT BLUE TOP     Medications   sodium chloride 0.9 % flush 10 mL (has no administration in time range)   sodium chloride 0.9 % flush 10 mL (has no administration in time range)     XR Chest 1 View    Result Date: 2/3/2022  No acute cardiopulmonary abnormality. Electronically Signed: Rosibel Mejia MD 2/3/2022 15:16 EST                                                 MDM  Number of Diagnoses or Management Options  COVID  Dyspnea, unspecified type  Diagnosis management comments: MEDICAL DECISION  Epic Chart Review: Records requested from [facility], they were not received in time during patient's [place] ER visit  Comorbidities: Hyperlipidemia, hypertension, diabetes  Differentials: COVID, pneumonia, pleural effusion; this list is not all inclusive and does not constitute the entirety of considered causes  Radiology interpretation:  Images reviewed by me and interpreted by radiologist, as above  Lab interpretation:  Labs viewed by me significant for, as above  EKG interpretation: Reviewed by myself interpreted by ER physician, sinus tachycardia with a rate of 122 with no acute ST changes.    While in the ED IV was placed and labs were obtained appropriate PPE was worn during exam and throughout all encounters with the patient.  Patient had the above evaluation.  IV established, lab work obtained.  Patient was placed on continuous telemetry monitoring throughout ER stay.  Patient tachycardic on arrival to the ER and appears very anxious.  Reports shortness of breath for the last \"5 years\" worse over the last few months, states that he has been seen at [facility] multiple times for evaluation but states they cannot figure out what is wrong with " "him.  Patient does admit to having stress test and echo completed yesterday at [facility] but does not know the results.  Patient states \"I even brought my 14-year-old son and here with me today so he can see that you are going to let me die\".  I advised patient that we would complete a full work-up and evaluation in the we will not \"let him die\" in our ER today.  While blood work was pending, I was notified by ER RN that patient refused for registration to register him, cursing and yelling at him, refusing to let his son register him as well.  Shortly after this patient's call light came on, I went to patient's room, he started yelling at me that we are \"going to let him die\" and that he is short of breath.  Patient's vital signs, he is tachycardic, appears very anxious, oxygen remained in the high 90s on room air.  Patient started cursing and I asked him not to curse.  Security called at this point.  Patient looked at his 14-year-old son and demanded that he start videotaping myself, the nurse and the security guards in the room.   asked patient and son to stop recording as it was not allowed on hospital property.  Patient became very agitated, aggressive, yelling and being verbally aggressive to myself and other staff.  I asked patient if you would like to stay to be treated and evaluated or that if he was going to continue to be verbally aggressive that he will be asked to leave.  Patient was escorted out of the ER.  Patient ran out into the parking lot, was visualized by charge nurse, Esther, pulling his arm across the parking lot.  I suggested that ER RNKristin via CPS report for the child's safety.  Patient left AMA.         Amount and/or Complexity of Data Reviewed  Clinical lab tests: ordered  Tests in the radiology section of CPT®: ordered and reviewed  Tests in the medicine section of CPT®: reviewed    Patient Progress  Patient progress: stable      Final diagnoses:   Dyspnea, unspecified type "   COVID       ED Disposition  ED Disposition     ED Disposition Condition Comment    Thaddeus Riggins MD  [address] [place] [address] [phone]    Schedule an appointment as soon as possible for a visit in 2 days  As needed, If symptoms worsen         Medication List      No changes were made to your prescriptions during this visit.          Jacquelyn Alvarado PA  02/03/22 1739

## 2022-02-03 NOTE — ED NOTES
"Pts son approached and stated, \"Is there anything we can get to cover my dads feet?  He doesn't want to put his bare feet on the floor.\"  I advised I would get him some socks.  Son again stated, \"He doesn't want to walk barefoot on floor.\"  I entered the room but was unable to find non-slip socks in a drawer.  Pt pointed to one of his monitor leads and asked if it needed to be connected.  Electrode was attached to pts gown.  I advised it did and that I would reconnect it.  Pt loudly asked why it was disconnected for 25 minutes.  I told him that I did not know but would reconnect it.  I peeled it from his gown and asked him to move his chin to move his beard out of the way so that I could put it back on his chest without trapping his beard hair.  Pt became more irate and told his son \"This is how they treat transgender people here son.  My pronouns are she/we.\"  I apologized and left to find non-slip socks.  When I returned with the socks, the provider and nurse were in the room involved in further confrontation.     Peter Cavanaugh, PCT  02/03/22 1607    "

## 2022-02-03 NOTE — ED NOTES
"Pt states to  Son \"if they try to discharge him that he needs to flight for him and that they will have to lock up a 14 year old\" Pt also state as I was leaving \"that bitch better not discharge me\"     Kristin Dias, RN  02/03/22 0099    "

## 2022-02-03 NOTE — ED NOTES
Pt very anxious in room. Pt moving around in bed and could not hold still.      Kristin Dias RN  02/03/22 5357

## 2022-02-03 NOTE — ED NOTES
14 year old son with pt. DCS contacted. Report number [id number]     Kristin Dias, RN  02/03/22 4399

## 2022-02-06 LAB — QT INTERVAL: 313 MS

## 2022-02-08 LAB
BACTERIA SPEC AEROBE CULT: NORMAL
BACTERIA SPEC AEROBE CULT: NORMAL

## 2022-02-14 ENCOUNTER — TRANSCRIBE ORDERS (OUTPATIENT)
Dept: ADMINISTRATIVE | Facility: HOSPITAL | Age: 47
End: 2022-02-14

## 2022-02-14 DIAGNOSIS — R06.02 SHORTNESS OF BREATH: Primary | ICD-10-CM

## 2022-02-14 DIAGNOSIS — Z01.818 OTHER SPECIFIED PRE-OPERATIVE EXAMINATION: ICD-10-CM

## 2022-02-22 ENCOUNTER — TELEPHONE (OUTPATIENT)
Dept: FAMILY MEDICINE CLINIC | Facility: CLINIC | Age: 47
End: 2022-02-22

## 2022-02-22 ENCOUNTER — OFFICE VISIT (OUTPATIENT)
Dept: FAMILY MEDICINE CLINIC | Facility: CLINIC | Age: 47
End: 2022-02-22

## 2022-02-22 ENCOUNTER — LAB (OUTPATIENT)
Dept: FAMILY MEDICINE CLINIC | Facility: CLINIC | Age: 47
End: 2022-02-22

## 2022-02-22 VITALS
HEART RATE: 94 BPM | DIASTOLIC BLOOD PRESSURE: 95 MMHG | WEIGHT: 210 LBS | SYSTOLIC BLOOD PRESSURE: 132 MMHG | TEMPERATURE: 97.8 F | HEIGHT: 72 IN | OXYGEN SATURATION: 99 % | BODY MASS INDEX: 28.44 KG/M2

## 2022-02-22 DIAGNOSIS — Z11.59 NEED FOR HEPATITIS C SCREENING TEST: ICD-10-CM

## 2022-02-22 DIAGNOSIS — Z79.4 TYPE 2 DIABETES MELLITUS WITH OTHER SPECIFIED COMPLICATION, WITH LONG-TERM CURRENT USE OF INSULIN: Primary | ICD-10-CM

## 2022-02-22 DIAGNOSIS — Z12.5 ENCOUNTER FOR SCREENING FOR MALIGNANT NEOPLASM OF PROSTATE: ICD-10-CM

## 2022-02-22 DIAGNOSIS — E11.69 TYPE 2 DIABETES MELLITUS WITH OTHER SPECIFIED COMPLICATION, WITH LONG-TERM CURRENT USE OF INSULIN: Primary | ICD-10-CM

## 2022-02-22 DIAGNOSIS — J42 CHRONIC BRONCHITIS, UNSPECIFIED CHRONIC BRONCHITIS TYPE: ICD-10-CM

## 2022-02-22 DIAGNOSIS — G89.4 CHRONIC PAIN SYNDROME: ICD-10-CM

## 2022-02-22 DIAGNOSIS — N40.2 PROSTATE NODULE: ICD-10-CM

## 2022-02-22 DIAGNOSIS — E78.2 MIXED HYPERLIPIDEMIA: ICD-10-CM

## 2022-02-22 DIAGNOSIS — Z80.42 FAMILY HISTORY OF PROSTATE CANCER IN FATHER: ICD-10-CM

## 2022-02-22 DIAGNOSIS — I10 ESSENTIAL HYPERTENSION: ICD-10-CM

## 2022-02-22 PROBLEM — E11.9 TYPE 2 DIABETES MELLITUS, WITH LONG-TERM CURRENT USE OF INSULIN: Status: ACTIVE | Noted: 2020-01-30

## 2022-02-22 LAB
ALBUMIN SERPL-MCNC: 5.2 G/DL (ref 3.5–5.2)
ALBUMIN UR-MCNC: 4.6 MG/DL
ALBUMIN/GLOB SERPL: 2.5 G/DL
ALP SERPL-CCNC: 56 U/L (ref 39–117)
ALT SERPL W P-5'-P-CCNC: 43 U/L (ref 1–41)
ANION GAP SERPL CALCULATED.3IONS-SCNC: 13.3 MMOL/L (ref 5–15)
AST SERPL-CCNC: 24 U/L (ref 1–40)
BASOPHILS # BLD AUTO: 0.1 10*3/MM3 (ref 0–0.2)
BASOPHILS NFR BLD AUTO: 1 % (ref 0–1.5)
BILIRUB SERPL-MCNC: 0.7 MG/DL (ref 0–1.2)
BUN SERPL-MCNC: 12 MG/DL (ref 6–20)
BUN/CREAT SERPL: 13.6 (ref 7–25)
CALCIUM SPEC-SCNC: 9.6 MG/DL (ref 8.6–10.5)
CHLORIDE SERPL-SCNC: 102 MMOL/L (ref 98–107)
CHOLEST SERPL-MCNC: 200 MG/DL (ref 0–200)
CO2 SERPL-SCNC: 23.7 MMOL/L (ref 22–29)
CREAT SERPL-MCNC: 0.88 MG/DL (ref 0.76–1.27)
CREAT UR-MCNC: 450.9 MG/DL
DEPRECATED RDW RBC AUTO: 41.2 FL (ref 37–54)
EOSINOPHIL # BLD AUTO: 0.3 10*3/MM3 (ref 0–0.4)
EOSINOPHIL NFR BLD AUTO: 3.1 % (ref 0.3–6.2)
ERYTHROCYTE [DISTWIDTH] IN BLOOD BY AUTOMATED COUNT: 13 % (ref 12.3–15.4)
GFR SERPL CREATININE-BSD FRML MDRD: 93 ML/MIN/1.73
GLOBULIN UR ELPH-MCNC: 2.1 GM/DL
GLUCOSE SERPL-MCNC: 133 MG/DL (ref 65–99)
HBA1C MFR BLD: 7.1 % (ref 3.5–5.6)
HCT VFR BLD AUTO: 46.1 % (ref 37.5–51)
HCV AB SER DONR QL: NORMAL
HDLC SERPL-MCNC: 41 MG/DL (ref 40–60)
HGB BLD-MCNC: 16.2 G/DL (ref 13–17.7)
IMM GRANULOCYTES # BLD AUTO: 0.03 10*3/MM3 (ref 0–0.05)
IMM GRANULOCYTES NFR BLD AUTO: 0.3 % (ref 0–0.5)
LDLC SERPL CALC-MCNC: 126 MG/DL (ref 0–100)
LDLC/HDLC SERPL: 2.97 {RATIO}
LYMPHOCYTES # BLD AUTO: 3.58 10*3/MM3 (ref 0.7–3.1)
LYMPHOCYTES NFR BLD AUTO: 36.9 % (ref 19.6–45.3)
MCH RBC QN AUTO: 31 PG (ref 26.6–33)
MCHC RBC AUTO-ENTMCNC: 35.1 G/DL (ref 31.5–35.7)
MCV RBC AUTO: 88.1 FL (ref 79–97)
MICROALBUMIN/CREAT UR: 10.2 MG/G
MONOCYTES # BLD AUTO: 0.65 10*3/MM3 (ref 0.1–0.9)
MONOCYTES NFR BLD AUTO: 6.7 % (ref 5–12)
NEUTROPHILS NFR BLD AUTO: 5.04 10*3/MM3 (ref 1.7–7)
NEUTROPHILS NFR BLD AUTO: 52 % (ref 42.7–76)
NRBC BLD AUTO-RTO: 0 /100 WBC (ref 0–0.2)
PLATELET # BLD AUTO: 230 10*3/MM3 (ref 140–450)
PMV BLD AUTO: 9.7 FL (ref 6–12)
POTASSIUM SERPL-SCNC: 4 MMOL/L (ref 3.5–5.2)
PROT SERPL-MCNC: 7.3 G/DL (ref 6–8.5)
RBC # BLD AUTO: 5.23 10*6/MM3 (ref 4.14–5.8)
SODIUM SERPL-SCNC: 139 MMOL/L (ref 136–145)
TRIGL SERPL-MCNC: 186 MG/DL (ref 0–150)
VIT B12 BLD-MCNC: 980 PG/ML (ref 211–946)
VLDLC SERPL-MCNC: 33 MG/DL (ref 5–40)
WBC NRBC COR # BLD: 9.7 10*3/MM3 (ref 3.4–10.8)

## 2022-02-22 PROCEDURE — 82043 UR ALBUMIN QUANTITATIVE: CPT | Performed by: FAMILY MEDICINE

## 2022-02-22 PROCEDURE — 82607 VITAMIN B-12: CPT | Performed by: FAMILY MEDICINE

## 2022-02-22 PROCEDURE — 82570 ASSAY OF URINE CREATININE: CPT | Performed by: FAMILY MEDICINE

## 2022-02-22 PROCEDURE — 86803 HEPATITIS C AB TEST: CPT | Performed by: FAMILY MEDICINE

## 2022-02-22 PROCEDURE — 80061 LIPID PANEL: CPT | Performed by: FAMILY MEDICINE

## 2022-02-22 PROCEDURE — 99205 OFFICE O/P NEW HI 60 MIN: CPT | Performed by: FAMILY MEDICINE

## 2022-02-22 PROCEDURE — 83036 HEMOGLOBIN GLYCOSYLATED A1C: CPT | Performed by: FAMILY MEDICINE

## 2022-02-22 PROCEDURE — 80053 COMPREHEN METABOLIC PANEL: CPT | Performed by: FAMILY MEDICINE

## 2022-02-22 PROCEDURE — 85025 COMPLETE CBC W/AUTO DIFF WBC: CPT | Performed by: FAMILY MEDICINE

## 2022-02-22 PROCEDURE — 36415 COLL VENOUS BLD VENIPUNCTURE: CPT | Performed by: FAMILY MEDICINE

## 2022-02-22 RX ORDER — INSULIN DETEMIR 100 [IU]/ML
20 INJECTION, SOLUTION SUBCUTANEOUS DAILY
Qty: 6 PEN | Refills: 1 | Status: SHIPPED | OUTPATIENT
Start: 2022-02-22 | End: 2022-08-04

## 2022-02-22 RX ORDER — MONTELUKAST SODIUM 10 MG/1
TABLET ORAL
COMMUNITY
Start: 2022-02-01 | End: 2022-02-22

## 2022-02-22 RX ORDER — EPINEPHRINE 0.3 MG/.3ML
INJECTION SUBCUTANEOUS
COMMUNITY
Start: 2022-01-04

## 2022-02-22 RX ORDER — DOXYCYCLINE HYCLATE 100 MG
TABLET ORAL
COMMUNITY
Start: 2022-01-27 | End: 2022-02-22

## 2022-02-22 RX ORDER — SITAGLIPTIN AND METFORMIN HYDROCHLORIDE 1000; 50 MG/1; MG/1
2 TABLET, FILM COATED, EXTENDED RELEASE ORAL DAILY
Qty: 180 TABLET | Refills: 1 | Status: SHIPPED | OUTPATIENT
Start: 2022-02-22 | End: 2023-02-21 | Stop reason: SDUPTHER

## 2022-02-22 RX ORDER — PREDNISONE 20 MG/1
TABLET ORAL
COMMUNITY
Start: 2022-01-27 | End: 2022-02-22

## 2022-02-22 RX ORDER — ALBUTEROL SULFATE 90 UG/1
AEROSOL, METERED RESPIRATORY (INHALATION)
COMMUNITY
Start: 2022-02-08 | End: 2023-02-22 | Stop reason: SDUPTHER

## 2022-02-22 RX ORDER — FLUTICASONE PROPIONATE 50 MCG
SPRAY, SUSPENSION (ML) NASAL
COMMUNITY
Start: 2021-12-08 | End: 2022-02-22

## 2022-02-22 RX ORDER — TRAMADOL HYDROCHLORIDE 50 MG/1
TABLET ORAL
COMMUNITY
Start: 2022-01-27 | End: 2022-02-22

## 2022-02-22 RX ORDER — LOSARTAN POTASSIUM 50 MG/1
50 TABLET ORAL DAILY
Qty: 90 TABLET | Refills: 1 | Status: SHIPPED | OUTPATIENT
Start: 2022-02-22 | End: 2022-07-29 | Stop reason: SDUPTHER

## 2022-02-22 RX ORDER — MELOXICAM 15 MG/1
TABLET ORAL
COMMUNITY
Start: 2022-01-14 | End: 2022-02-22

## 2022-02-22 RX ORDER — AZELASTINE 1 MG/ML
SPRAY, METERED NASAL
COMMUNITY
Start: 2022-01-04 | End: 2022-02-22

## 2022-02-22 NOTE — TELEPHONE ENCOUNTER
Can we call patient's pharmacy to see if there's a PA that needs to be completed for Breztri? Does insurance have alternatives it prefers?

## 2022-02-22 NOTE — PATIENT INSTRUCTIONS
Diabetes Mellitus and Nutrition, Adult  When you have diabetes, or diabetes mellitus, it is very important to have healthy eating habits because your blood sugar (glucose) levels are greatly affected by what you eat and drink. Eating healthy foods in the right amounts, at about the same times every day, can help you:  · Control your blood glucose.  · Lower your risk of heart disease.  · Improve your blood pressure.  · Reach or maintain a healthy weight.  What can affect my meal plan?  Every person with diabetes is different, and each person has different needs for a meal plan. Your health care provider may recommend that you work with a dietitian to make a meal plan that is best for you. Your meal plan may vary depending on factors such as:  · The calories you need.  · The medicines you take.  · Your weight.  · Your blood glucose, blood pressure, and cholesterol levels.  · Your activity level.  · Other health conditions you have, such as heart or kidney disease.  How do carbohydrates affect me?  Carbohydrates, also called carbs, affect your blood glucose level more than any other type of food. Eating carbs naturally raises the amount of glucose in your blood. Carb counting is a method for keeping track of how many carbs you eat. Counting carbs is important to keep your blood glucose at a healthy level, especially if you use insulin or take certain oral diabetes medicines.  It is important to know how many carbs you can safely have in each meal. This is different for every person. Your dietitian can help you calculate how many carbs you should have at each meal and for each snack.  How does alcohol affect me?  Alcohol can cause a sudden decrease in blood glucose (hypoglycemia), especially if you use insulin or take certain oral diabetes medicines. Hypoglycemia can be a life-threatening condition. Symptoms of hypoglycemia, such as sleepiness, dizziness, and confusion, are similar to symptoms of having too much  "alcohol.  · Do not drink alcohol if:  ? Your health care provider tells you not to drink.  ? You are pregnant, may be pregnant, or are planning to become pregnant.  · If you drink alcohol:  ? Do not drink on an empty stomach.  ? Limit how much you use to:  § 0-1 drink a day for women.  § 0-2 drinks a day for men.  ? Be aware of how much alcohol is in your drink. In the [place] one drink equals one 12 oz bottle of beer (355 mL), one 5 oz glass of wine (148 mL), or one 1½ oz glass of hard liquor (44 mL).  ? Keep yourself hydrated with water, diet soda, or unsweetened iced tea.  § Keep in mind that regular soda, juice, and other mixers may contain a lot of sugar and must be counted as carbs.  What are tips for following this plan?    Reading food labels  · Start by checking the serving size on the \"Nutrition Facts\" label of packaged foods and drinks. The amount of calories, carbs, fats, and other nutrients listed on the label is based on one serving of the item. Many items contain more than one serving per package.  · Check the total grams (g) of carbs in one serving. You can calculate the number of servings of carbs in one serving by dividing the total carbs by 15. For example, if a food has 30 g of total carbs per serving, it would be equal to 2 servings of carbs.  · Check the number of grams (g) of saturated fats and trans fats in one serving. Choose foods that have a low amount or none of these fats.  · Check the number of milligrams (mg) of salt (sodium) in one serving. Most people should limit total sodium intake to less than 2,300 mg per day.  · Always check the nutrition information of foods labeled as \"low-fat\" or \"nonfat.\" These foods may be higher in added sugar or refined carbs and should be avoided.  · Talk to your dietitian to identify your daily goals for nutrients listed on the label.  Shopping  · Avoid buying canned, pre-made, or processed foods. These foods tend to be high in fat, sodium, and added " sugar.  · Shop around the outside edge of the grocery store. This is where you will most often find fresh fruits and vegetables, bulk grains, fresh meats, and fresh dairy.  Cooking  · Use low-heat cooking methods, such as baking, instead of high-heat cooking methods like deep frying.  · Cook using healthy oils, such as olive, canola, or sunflower oil.  · Avoid cooking with butter, cream, or high-fat meats.  Meal planning  · Eat meals and snacks regularly, preferably at the same times every day. Avoid going long periods of time without eating.  · Eat foods that are high in fiber, such as fresh fruits, vegetables, beans, and whole grains. Talk with your dietitian about how many servings of carbs you can eat at each meal.  · Eat 4-6 oz (112-168 g) of lean protein each day, such as lean meat, chicken, fish, eggs, or tofu. One ounce (oz) of lean protein is equal to:  ? 1 oz (28 g) of meat, chicken, or fish.  ? 1 egg.  ? ¼ cup (62 g) of tofu.  · Eat some foods each day that contain healthy fats, such as avocado, nuts, seeds, and fish.  What foods should I eat?  Fruits  Berries. Apples. Oranges. Peaches. Apricots. Plums. Grapes. [place]. Papaya. Pomegranate. Kiwi. Cherries.  Vegetables  Lettuce. Spinach. Leafy greens, including kale, chard, abdoul greens, and mustard greens. Beets. Cauliflower. Cabbage. Broccoli. Carrots. Green beans. Tomatoes. Peppers. Onions. Cucumbers. [place] sprouts.  Grains  Whole grains, such as whole-wheat or whole-grain bread, crackers, tortillas, cereal, and pasta. Unsweetened oatmeal. Quinoa. Brown or wild rice.  Meats and other proteins  Seafood. Poultry without skin. Lean cuts of poultry and beef. Tofu. Nuts. Seeds.  Dairy  Low-fat or fat-free dairy products such as milk, yogurt, and cheese.  The items listed above may not be a complete list of foods and beverages you can eat. Contact a dietitian for more information.  What foods should I avoid?  Fruits  Fruits canned with  syrup.  Vegetables  Canned vegetables. Frozen vegetables with butter or cream sauce.  Grains  Refined white flour and flour products such as bread, pasta, snack foods, and cereals. Avoid all processed foods.  Meats and other proteins  Fatty cuts of meat. Poultry with skin. Breaded or fried meats. Processed meat. Avoid saturated fats.  Dairy  Full-fat yogurt, cheese, or milk.  Beverages  Sweetened drinks, such as soda or iced tea.  The items listed above may not be a complete list of foods and beverages you should avoid. Contact a dietitian for more information.  Questions to ask a health care provider  · Do I need to meet with a diabetes educator?  · Do I need to meet with a dietitian?  · What number can I call if I have questions?  · When are the best times to check my blood glucose?  Where to find more information:  · American Diabetes Association: diabetes.org  · Academy of Nutrition and Dietetics: www.eatright.org  · National [place] of Diabetes and Digestive and Kidney Diseases: www.niddk.nih.gov  · Association of Diabetes Care and Education Specialists: www.diabeteseducator.org  Summary  · It is important to have healthy eating habits because your blood sugar (glucose) levels are greatly affected by what you eat and drink.  · A healthy meal plan will help you control your blood glucose and maintain a healthy lifestyle.  · Your health care provider may recommend that you work with a dietitian to make a meal plan that is best for you.  · Keep in mind that carbohydrates (carbs) and alcohol have immediate effects on your blood glucose levels. It is important to count carbs and to use alcohol carefully.  This information is not intended to replace advice given to you by your health care provider. Make sure you discuss any questions you have with your health care provider.  Document Revised: 11/24/2020 Document Reviewed: 11/24/2020  [employer] Patient Education © 2021 [employer]      Diabetes Mellitus and  "Exercise  Exercising regularly is important for overall health, especially for people who have diabetes mellitus. Exercising is not only about losing weight. It has many other health benefits, such as increasing muscle strength and bone density and reducing body fat and stress. This leads to improved fitness, flexibility, and endurance, all of which result in better overall health.  What are the benefits of exercise if I have diabetes?  Exercise has many benefits for people with diabetes. They include:  · Helping to lower and control blood sugar (glucose).  · Helping the body to respond better to the hormone insulin by improving insulin sensitivity.  · Reducing how much insulin the body needs.  · Lowering the risk for heart disease by:  ? Lowering \"bad\" cholesterol and triglyceride levels.  ? Increasing \"good\" cholesterol levels.  ? Lowering blood pressure.  ? Lowering blood glucose levels.  What is my activity plan?  Your health care provider or certified diabetes educator can help you make a plan for the type and frequency of exercise that works for you. This is called your activity plan. Be sure to:  · Get at least 150 minutes of medium-intensity or high-intensity exercise each week. Exercises may include brisk walking, biking, or water aerobics.  · Do stretching and strengthening exercises, such as yoga or weight lifting, at least 2 times a week.  · Spread out your activity over at least 3 days of the week.  · Get some form of physical activity each day.  ? Do not go more than 2 days in a row without some kind of physical activity.  ? Avoid being inactive for more than 90 minutes at a time. Take frequent breaks to walk or stretch.  · Choose exercises or activities that you enjoy. Set realistic goals.  · Start slowly and gradually increase your exercise intensity over time.  How do I manage my diabetes during exercise?    Monitor your blood glucose  · Check your blood glucose before and after exercising. If your " blood glucose is:  ? 240 mg/dL (13.3 mmol/L) or higher before you exercise, check your urine for ketones. These are chemicals created by the liver. If you have ketones in your urine, do not exercise until your blood glucose returns to normal.  ? 100 mg/dL (5.6 mmol/L) or lower, eat a snack containing 15-20 grams of carbohydrate. Check your blood glucose 15 minutes after the snack to make sure that your glucose level is above 100 mg/dL (5.6 mmol/L) before you start your exercise.  · Know the symptoms of low blood glucose (hypoglycemia) and how to treat it. Your risk for hypoglycemia increases during and after exercise.  Follow these tips and your health care provider's instructions  · Keep a carbohydrate snack that is fast-acting for use before, during, and after exercise to help prevent or treat hypoglycemia.  · Avoid injecting insulin into areas of the body that are going to be exercised. For example, avoid injecting insulin into:  ? Your arms, when you are about to play tennis.  ? Your legs, when you are about to go jogging.  · Keep records of your exercise habits. Doing this can help you and your health care provider adjust your diabetes management plan as needed. Write down:  ? Food that you eat before and after you exercise.  ? Blood glucose levels before and after you exercise.  ? The type and amount of exercise you have done.  · Work with your health care provider when you start a new exercise or activity. He or she may need to:  ? Make sure that the activity is safe for you.  ? Adjust your insulin, other medicines, and food that you eat.  · Drink plenty of water while you exercise. This prevents loss of water (dehydration) and problems caused by a lot of heat in the body (heat stroke).  Where to find more information  · American Diabetes Association: www.diabetes.org  Summary  · Exercising regularly is important for overall health, especially for people who have diabetes mellitus.  · Exercising has many  health benefits. It increases muscle strength and bone density and reduces body fat and stress. It also lowers and controls blood glucose.  · Your health care provider or certified diabetes educator can help you make an activity plan for the type and frequency of exercise that works for you.  · Work with your health care provider to make sure any new activity is safe for you. Also work with your health care provider to adjust your insulin, other medicines, and the food you eat.  This information is not intended to replace advice given to you by your health care provider. Make sure you discuss any questions you have with your health care provider.  Document Revised: 09/14/2020 Document Reviewed: 09/14/2020  [employer] Patient Education © 2021 [employer]      COPD Action Plan  A COPD action plan is a description of what to do when you have a flare (exacerbation) of chronic obstructive pulmonary disease (COPD). Your action plan is a color-coded plan that lists the symptoms that indicate whether or not your condition is under control and what actions to take.  · If you have symptoms in the green zone, it means you are doing well that day.  · If you have symptoms in the yellow zone, it means you are having a bad day or an exacerbation.  · If you have symptoms in the red zone, you need urgent medical care.  Follow the plan you and your health care provider developed. Review your plan with your health care provider at each visit.  Red zone  Symptoms in this zone mean that you should get medical help right away. They include:  · Feeling very short of breath, even when you are resting.  · Not being able to do any activities because of poor breathing.  · Not being able to sleep because of poor breathing.  · Fever or shaking chills.  · Feeling confused or very sleepy.  · Chest pain.  · Coughing up blood.  If you have any of these symptoms, call emergency services (911 in the [place] or go to the nearest emergency room.  Yellow  "zone  Symptoms in this zone mean that your condition may be getting worse. They include:  · Feeling more short of breath than usual.  · Having less energy for daily activities than usual.  · Phlegm or mucus that is thicker than usual.  · Needing to use your rescue inhaler or nebulizer more often than usual.  · More ankle swelling than usual.  · Coughing more than usual.  · Feeling like you have a chest cold.  · Trouble sleeping due to COPD symptoms.  · Decreased appetite.  · COPD medicines not helping as much as usual.  If you experience any \"yellow\" symptoms:  · Keep taking your daily medicines as directed.  · Use your quick-relief inhaler as told by your health care provider.  · If you were prescribed steroid medicine to take by mouth (oral medicine), start taking it as told by your health care provider.  · If you were prescribed an antibiotic, start taking it as told by your health care provider. Do not stop taking the antibiotic even if you start to feel better.  · Use oxygen as told by your health care provider.  · Get more rest.  · Do your pursed-lip breathing exercises.  · Do not smoke. Avoid any irritants in the air.  If your signs and symptoms do not improve after taking these steps, call your health care provider right away.  Green zone  Symptoms in this zone mean that you are doing well. They include:  · Being able to do your usual activities and exercise.  · Having the usual amount of coughing, including the same amount of phlegm or mucus.  · Being able to sleep well.  · Having a good appetite.  Follow these instructions at home:  · Continue taking your daily medicines as told by your health care provider.  · Make sure you receive all the immunizations that your health care provider recommends, especially the pneumococcal and influenza vaccines.  · Wash your hands often with soap and water. Have family members wash their hands too. Regular hand washing can help prevent infections.  · Follow your usual " exercise and diet plan.  · Avoid irritants in the air, such as smoke.  · Do not use any products that contain nicotine or tobacco, such as cigarettes and e-cigarettes. If you need help quitting, ask your health care provider.  Where to find more information:  You can find more information about COPD from:  · American Lung Association, My COPD Action Plan: www.lung.org/assets/documents/copd/copd-action-plan.pdf  · COPD Foundation: www.copdfoundation.org  · National Heart, Lung, & Blood [place]: https://www.nhlbi.nih.gov/health-topics/copd  This information is not intended to replace advice given to you by your health care provider. Make sure you discuss any questions you have with your health care provider.  Document Revised: 02/01/2021 Document Reviewed: 05/02/2018  [employer] Patient Education © 2021 [employer] Inc.       ADVANCE CARE PLANNING  Conversations that Matter  PLANNING GUIDE    LOOKING BACK ...  Who we are, what we believe, and what we value are all shaped by experiences we have had. Lutheran, family traditions, jobs and friends affect us deeply.    Has anything happened in your past that shaped your feelings about medical treatment?    Think about an experience you may have had with a family member or friend who was faced with a decision about medical care near the end of life. What was positive about that experience? What do you wish would have been done differently?    HERE AND NOW ...  Do you have any significant health problems now? What kinds of things bring you such jossue that, should a health problem prevent you from doing them any more, life would have little meaning? What short- or long-term goals do you have? How might medical treatment help you or hinder you in accomplishing those goals?    WHAT ABOUT TOMORROW?  What significant health problems do you fear may affect you in the future? How do you feel about the possibility of having to go to a nursing home? How would decisions be made if you  "could not make them?    WHO SHOULD MAKE DECISIONS?  An important part of planning is to consider whether or not you could appoint someone to make your healthcare decisions if you could not make them yourself. Many people select a close family member, but you are free to pick anyone you think could best represent you. Whoever you appoint should have all of the following qualifications:    • Can be trusted.  • Is willing to accept this responsibility.  • Is willing to follow the values and instructions you have discussed.  • Is able to make complex, difficult decisions.    It is helpful, but not required, to appoint one or more alternate persons in case your first choice becomes unable or unwilling to represent you. It is best if only one person has authority at a time, but you can instruct your representatives to discuss decisions together if time permits.    WHAT FUTURE DECISIONS NEED TO BE CONSIDERED?  Providing instructions for future healthcare decisions may seem like an impossible task. How can anyone plan for all the possibilities? You cannot … but you do not have to.    You need to plan for situations where you:  1. Become unexpectedly incapable of making your own decisions  2. It is clear you will have little or no recovery, and  3. The injury or loss of function is significant.    Such a situation might arise because of an injury to the brain from an accident, a stroke, or a slowly progressive disease such as Alzheimer's.    To plan for this type of situation, many people state, \"If I'm going to be a vegetable, let me go,\" or \"No heroics,\" or \"Don't keep me alive on machines.\" While these remarks are a beginning, they simply are too vague to guide decision-making.    You need to completely describe under what circumstances your goals for medical care should be changed from attempting to prolong life to being allowed to die. In some situations, certain treatments may not make sense because they will not help, " but other treatments will be of important benefit.    Consider these three questions:  1. When would it make sense to continue certain treatments in an effort to prolong life and seek recovery?  2. When would it make sense to stop or withhold certain treatments and accept death when it comes?  3. Under any circumstance, what kind of comfort care would you want, including medication, spiritual and environmental options?    Making these choices requires understanding the information, weighing the benefits and burdens from your perspective, and then discussing your choices with those closest to you.    WHAT'S NEXT?  How do you make sure that your choices are respected? First talk, about them with your family, friends, clergy and physician, then put your choices in writing. Information about putting your plans into writing -- in an advance directive -- is available from your healthcare organization or .    Do you have any significant health problems? What health problems do you fear in the future?     Consider what frightens you most about medical treatment. What role does Baptism, lindsay or spirituality play in how you live your life? How does cost influence your decisions about medical care?   In terms of future medical care, under what circumstances would you want the goals of medical treatment to switch from attempting to prolong life to focusing on comfort?     Describe these circumstances in as much detail as possible.   Ask yourself, what will most help me live well at this point in my life?     Share your views with the person or people who would make your medical decisions if you could not make them.     THERE'S NO EASY WAY TO PLAN FOR FUTURE HEALTHCARE CHOICES.  It's a process that involves thinking and talking about complex and sensitive issues.    The questions that follow will help in the advance care planning process. This is a guide for your own benefit; it's not a test, and there are no right or  wrong answers. It does not need to be completed all at once. You may use it to share your feelings with healthcare providers, your family and your friends. The answers to these questions will help those you love make choices for you when you cannot make them yourself.    These are things I need to tell my loved ones:  What is your idea of comfort care? Describe how you would want medications to be used to provide comfort. What type of spiritual care would you want?     I need to learn about: ____________________________  I need to ask my healthcare provider: ________________

## 2022-02-22 NOTE — TELEPHONE ENCOUNTER
Caller: Aj Plaza    Relationship: Self    Best call back number: [phone]       What is the best time to reach you: ANYTIME     Who are you requesting to speak with (clinical staff, provider,  specific staff member): CLINICAL STAFF       What was the call regarding: PATIENT IS CALLING TO LET DR GONZALEZ KNOW THAT THE INHALER ARE WORKING VERY WELL.      PATIENT IS CONCERNED BECAUSE THE PHARMACY CALLED AND STATED IT WOULD BE $700 FOR THE INHALER. PATIENT IS WANTING TO KNOW WHAT HE NEEDS TO DO WHEN THE ONES HE WAS GIVEN RUN OUT.     Do you require a callback: YES

## 2022-02-22 NOTE — PROGRESS NOTES
Assessment and Plan     Problem List Items Addressed This Visit        Cardiac and Vasculature    Essential hypertension    Overview     BP not at goal, <140/90.  Encouraged low-Na+ diet & 150 min exercise/week.   Increase losartan to 50 mg daily.  Patient to monitor ambulatory BP.  Monitoring renal function.         Relevant Medications    EPINEPHrine (EPIPEN) 0.3 MG/0.3ML solution auto-injector injection    losartan (COZAAR) 50 MG tablet    Other Relevant Orders    CBC Auto Differential    Comprehensive Metabolic Panel    Lipid Panel    Microalbumin / Creatinine Urine Ratio - Urine, Clean Catch    Mixed hyperlipidemia    Overview     Reviewed LDL goal, & 10-year ASCVD risk score.  Plan to recheck levels.  Encouraged a diet rich in vegetables & 150 minutes of exercise weekly.  Continue atorvastatin nightly.         Relevant Orders    CBC Auto Differential    Comprehensive Metabolic Panel    Lipid Panel       Endocrine and Metabolic    Type 2 diabetes mellitus, with long-term current use of insulin (HCC) - Primary    Overview     Historically uncontrolled.  HbA1c goal, <7%.  Monitoring regularly.  Monitoring renal function.  Continue Janumet -2000 mg daily and Lantus 20 units nightly.  Consider referral to endocrinology or nutritionist.  Eye & foot exam due.         Relevant Medications    losartan (COZAAR) 50 MG tablet    SITagliptin-metFORMIN HCl ER (Janumet XR)  MG tablet    Other Relevant Orders    CBC Auto Differential    Comprehensive Metabolic Panel    Hemoglobin A1c    Vitamin B12    Microalbumin / Creatinine Urine Ratio - Urine, Clean Catch       Neuro    Chronic pain syndrome    Overview     Previously followed by pain management.  Patient requesting referral.  No imaging available.  Advised < 3g acetaminophen/day PRN.         Relevant Orders    Ambulatory Referral to Pain Management       Pulmonary and Pneumonias    Chronic bronchitis (HCC)    Overview     Former smoker. Encouraged continued  smoking cessation.  Would benefit from pneumococcal vaccination.  Reviewed unremarkable CXR.  Would benefit from lung cancer screening at 49.  Will obtain PFT.  Switching Anoro to Breztri. Sample provided in office.  COPD action plan in AVS.  Follow up with pulmonology.          Relevant Medications    albuterol sulfate  (90 Base) MCG/ACT inhaler    Budeson-Glycopyrrol-Formoterol (BREZTRI) 160-9-4.8 MCG/ACT aerosol inhaler    Other Relevant Orders    Full Pulmonary Function Test With Bronchodilator      Other Visit Diagnoses     Prostate nodule        Enlarged prostate with tenderness.  Insurance will not cover PSA. Cannot afford out of pocket.  Will refer to urology.    Relevant Orders    Ambulatory Referral to Urology (Completed)    Family history of prostate cancer in father        Relevant Orders    Ambulatory Referral to Urology (Completed)    Encounter for screening for malignant neoplasm of prostate        Relevant Orders    Ambulatory Referral to Urology (Completed)    Need for hepatitis C screening test        Relevant Orders    Hepatitis C Antibody        40 minutes spent with patient and family counseling and coordinating care.  20 minutes spent reviewing medical records and documenting in EMR.    Return in about 3 months (around 5/22/2022) for Recheck DM II & BP.          Patient was given instructions and counseling regarding his condition or for health maintenance advice. Please see specific information pulled into the AVS if appropriate.        Aj is a 46 y.o. being seen today for  Breathing Problem (difficulty breathing and has black tinted mucus. pain in back and chest when taking deep breaths ) and Pain (pain in hips that goes all the way down to his feet. groin pain when driving. pain is better when he lays down )   Subjective   History of the Present Illness      male with concurrent medical history of type 2 diabetes mellitus and hypertension presents to establish care.  "Present with      Hypertensive in office. Patient does not monitor ambulatory BP.    Diabetes management is unknown. No available hemoglobin A1c. Does not eat fast food or soft drinks.  Reports that he cannot tolerate the scents of meats.  Alpha gal testing was unremarkable per patient reporting.      Presents for complaints of \"taint\" pain for the last year. Reports a family hx of prostate cancer in father.     Patient is a former smoker of 30 years and complains of dyspnea radiating to the back.  Quit smoking 5 years ago due to dyspnea and dreams of gasping for air.  Associated symptoms include productive cough with black sputum.  Symptoms are exacerbated by deep breathing and worsening in recent months.  Provided Anoro by pulmonologist for presumable COPD.  Reports that benefit tapers after 8 hours.  Using albuterol inhaler 14-15 times per day.  Pulmonologist wanted to do a bronchoscopy but patient does not have family to drive him to and from his appointment after anesthesia.    Requesting referral to pain management for chronic back pain. Patient was previously in pain management. No imaging available. Reports no benefit from NSAIDS or Tylenol.    Social History  He  reports that he quit smoking about 5 years ago. His smoking use included cigarettes. He has a 60.00 pack-year smoking history. He has quit using smokeless tobacco. He reports previous alcohol use. He reports previous drug use. Drug: Marijuana.  Objective   Vital Signs          Vitals:    02/22/22 1033   BP: 132/95   BP Location: Left arm   Patient Position: Sitting   Cuff Size: Large Adult   Pulse: 94   Temp: 97.8 °F (36.6 °C)   TempSrc: Infrared   SpO2: 99%   Weight: 95.3 kg (210 lb)   Height: 182.9 cm (72\")     BP Readings from Last 1 Encounters:   02/22/22 132/95     Wt Readings from Last 3 Encounters:   02/22/22 95.3 kg (210 lb)   02/03/22 98.2 kg (216 lb 7.9 oz)   01/30/20 95.3 kg (210 lb)   Body mass index is 28.48 kg/m².     Physical " Exam  Vitals reviewed. Exam conducted with a chaperone present (Angeles Santos MA).   Constitutional:       General: He is not in acute distress.     Appearance: He is well-developed. He is not diaphoretic.   HENT:      Head: Normocephalic and atraumatic.      Right Ear: Tympanic membrane and ear canal normal.      Left Ear: Ear canal normal. Tympanic membrane is retracted.      Mouth/Throat:      Mouth: Mucous membranes are moist.      Pharynx: Oropharynx is clear.      Comments: Split uvula  Eyes:      Conjunctiva/sclera: Conjunctivae normal.      Pupils: Pupils are equal, round, and reactive to light.   Cardiovascular:      Rate and Rhythm: Normal rate and regular rhythm.      Heart sounds: Normal heart sounds.   Pulmonary:      Effort: Pulmonary effort is normal.      Breath sounds: Normal breath sounds.      Comments: Tripod sitting  Genitourinary:     Prostate: Enlarged, tender and nodules present.      Rectum: Normal anal tone.   Musculoskeletal:      Right lower leg: No edema.      Left lower leg: No edema.   Skin:     General: Skin is warm and dry.   Neurological:      Mental Status: He is alert and oriented to person, place, and time.   Psychiatric:         Mood and Affect: Mood normal.         Behavior: Behavior normal.               XR Chest 1 View (02/03/2022 15:14)  CBC & Differential (02/03/2022 14:38)  Comprehensive Metabolic Panel (02/03/2022 14:38)

## 2022-02-24 DIAGNOSIS — E78.2 MIXED HYPERLIPIDEMIA: Primary | ICD-10-CM

## 2022-02-24 RX ORDER — ATORVASTATIN CALCIUM 40 MG/1
40 TABLET, FILM COATED ORAL NIGHTLY
Qty: 90 TABLET | Refills: 1 | Status: SHIPPED | OUTPATIENT
Start: 2022-02-24 | End: 2022-08-16

## 2022-02-28 ENCOUNTER — TELEPHONE (OUTPATIENT)
Dept: FAMILY MEDICINE CLINIC | Facility: CLINIC | Age: 47
End: 2022-02-28

## 2022-02-28 NOTE — TELEPHONE ENCOUNTER
Please call patient and let him know that he would need to be seen in office to be evaluated for oxygen therapy.  In the interim I recommend continuing Breztri, 2 puffs twice daily.  Please be sure to rinse mouth after each use.

## 2022-02-28 NOTE — TELEPHONE ENCOUNTER
Caller: Aj Plaza    Relationship to patient: Self    Best call back number: [phone]    Patient is needing: PATIENT HAD APPT 2/22 AND WAS PRESCRIBED INHALERS. PATIENT STATES THAT THE INHALERS HELPED WITH BREATHING FOR THE FIRST DAY, BUT AFTER THAT THEY ARE NOT HELPING ANY MORE. HE STATES THAT DR GONZALEZ DISCUSSED POSSIBLE OXYGEN TREATMENTS, AND PATIENT WOULD LIKE TO PROCEED WITH THIS TREATMENT. PATIENT IS NOT SURE WHAT HE NEEDS TO DO FOR OXYGEN TREATMENTS AND IS REQUESTING A CALL BACK TO GET THIS STARTED    PLEASE CALL BACK AND ADVISE

## 2022-03-01 ENCOUNTER — OFFICE VISIT (OUTPATIENT)
Dept: FAMILY MEDICINE CLINIC | Facility: CLINIC | Age: 47
End: 2022-03-01

## 2022-03-01 VITALS
SYSTOLIC BLOOD PRESSURE: 127 MMHG | OXYGEN SATURATION: 99 % | BODY MASS INDEX: 27.9 KG/M2 | DIASTOLIC BLOOD PRESSURE: 86 MMHG | WEIGHT: 206 LBS | HEIGHT: 72 IN | HEART RATE: 112 BPM | TEMPERATURE: 98.4 F

## 2022-03-01 DIAGNOSIS — R20.2 PARESTHESIA OF BOTH HANDS: ICD-10-CM

## 2022-03-01 DIAGNOSIS — J42 CHRONIC BRONCHITIS, UNSPECIFIED CHRONIC BRONCHITIS TYPE: Primary | ICD-10-CM

## 2022-03-01 PROCEDURE — 99214 OFFICE O/P EST MOD 30 MIN: CPT | Performed by: FAMILY MEDICINE

## 2022-03-01 NOTE — PROGRESS NOTES
Assessment and Plan     Problem List Items Addressed This Visit        Pulmonary and Pneumonias    Chronic bronchitis (HCC) - Primary    Overview     Former smoker. Encouraged continued smoking cessation.  Would benefit from pneumococcal vaccination.  Would benefit from lung cancer screening at 49.  Will obtain PFT.  Continue Breztri.  Consider Daliresp. Sample provided in office.  COPD action plan in AVS.  Follow up with pulmonology.          Relevant Orders    Ambulatory Referral to Pulmonary Rehab      Other Visit Diagnoses     Paresthesia of both hands        Reviewed labs with patient.  Would benefit from EMG.  Will refer to neurology.    Relevant Orders    Ambulatory Referral to Neurology        Return if symptoms worsen or fail to improve, for keep appointment in May..        Patient was given instructions and counseling regarding his condition or for health maintenance advice. Please see specific information pulled into the AVS if appropriate.        Aj is a 46 y.o. being seen today for  Breathing Problem (trouble breathing. coughing up black mucus. would like to possibly get oxygen. ) and Numbness (finger numbness )   Subjective   History of the Present Illness     Diabetic male with a concurrent medical history of chronic bronchitis presents for follow-up.  Patient received samples of Breztri at his last office visit approximately a week ago.  Reports little to no improvement of respiratory symptoms.  Continuing to produce black tinted sputum.  Patient maintained appropriate oxygen saturation at 97% on 6 minute walk test.     Presents with complaints of intermittent bilateral hand numbness extending up to elbows. Reports that symptoms have worsened in the last three months. Exacerbated by coughing; reports benefit with expulsion of sputum. Hemoglobin A1c approaching goal. Vitamin B12 level is within appropriate range.    Social History  He  reports that he quit smoking about 5 years ago. His smoking  "use included cigarettes. He has a 60.00 pack-year smoking history. He has quit using smokeless tobacco. He reports previous alcohol use. He reports previous drug use. Drug: Marijuana.  Objective   Vital Signs          Vitals:    03/01/22 1246   BP: 127/86   BP Location: Right arm   Patient Position: Sitting   Cuff Size: Large Adult   Pulse: 112   Temp: 98.4 °F (36.9 °C)   TempSrc: Infrared   SpO2: 99%   Weight: 93.4 kg (206 lb)   Height: 182.9 cm (72\")     BP Readings from Last 1 Encounters:   03/01/22 127/86     Wt Readings from Last 3 Encounters:   03/01/22 93.4 kg (206 lb)   02/22/22 95.3 kg (210 lb)   02/03/22 98.2 kg (216 lb 7.9 oz)   Body mass index is 27.94 kg/m².     Physical Exam  Vitals reviewed.   Constitutional:       General: He is not in acute distress.     Appearance: Normal appearance.   HENT:      Head: Normocephalic and atraumatic.   Neck:      Comments: -Spurling test  Cardiovascular:      Rate and Rhythm: Normal rate.      Heart sounds: Normal heart sounds.   Pulmonary:      Effort: Pulmonary effort is normal.      Breath sounds: Normal breath sounds.   Musculoskeletal:      Right shoulder: Normal strength.      Left shoulder: Normal strength.      Right hand: Normal strength.      Left hand: Normal strength.      Cervical back: Normal range of motion.      Comments: -Empty can test   Neurological:      Mental Status: He is alert and oriented to person, place, and time.   Psychiatric:         Mood and Affect: Mood normal.         Behavior: Behavior normal.               Vitamin B12 (02/22/2022 11:46)  Hemoglobin A1c (02/22/2022 11:46)  Comprehensive Metabolic Panel (02/22/2022 11:46)  CBC Auto Differential (02/22/2022 11:46)    "

## 2022-03-01 NOTE — PATIENT INSTRUCTIONS
Roflumilast oral tablets  What is this medicine?  ROFLUMILAST (lui FLUE mi last) decreases inflammation in the lungs. This medicine is used to prevent COPD flare-ups. Do not use this medicine to treat sudden breathing problems.  This medicine may be used for other purposes; ask your health care provider or pharmacist if you have questions.  COMMON BRAND NAME(S): Katrina  What should I tell my health care provider before I take this medicine?  They need to know if you have any of these conditions:  · liver disease  · mental illness  · an unusual or allergic reaction to roflumilast, other medicines, foods, dyes, or preservatives  · pregnant or trying to get pregnant  · breast-feeding  How should I use this medicine?  Take this medicine by mouth with a glass of water. Follow the directions on the prescription label. You can take it with or without food. If it upsets your stomach, take it with food. Take your medicine at regular intervals. Do not take it more often than directed. Do not stop taking except on your doctor's advice.  A special MedGuide will be given to you by the pharmacist with each prescription and refill. Be sure to read this information carefully each time.  Talk to your pediatrician regarding the use of this medicine in children. Special care may be needed.  Overdosage: If you think you have taken too much of this medicine contact a poison control center or emergency room at once.  NOTE: This medicine is only for you. Do not share this medicine with others.  What if I miss a dose?  If you miss a dose, take it as soon as you can. If it is almost time for your next dose, take only that dose. Do not take double or extra doses.  What may interact with this medicine?  · carbamazepine  · cimetidine  · enoxacin  · erythromycin  · fluvoxamine  · ketoconazole  · phenobarbital  · phenytoin  · rifampicin  · [place]'s wort  This list may not describe all possible interactions. Give your health care provider a  list of all the medicines, herbs, non-prescription drugs, or dietary supplements you use. Also tell them if you smoke, drink alcohol, or use illegal drugs. Some items may interact with your medicine.  What should I watch for while using this medicine?  Visit your doctor for regular check ups. Tell your doctor or healthcare professional if your symptoms do not start to get better or if they get worse.  What side effects may I notice from receiving this medicine?  Side effects that you should report to your doctor or health care professional as soon as possible:  · allergic reactions like skin rash, itching or hives, swelling of the face, lips, or tongue  · anxious  · breathing problems  · suicidal thoughts or other mood changes  · trouble sleeping  · weight loss  Side effects that usually do not require medical attention (report to your doctor or health care professional if they continue or are bothersome):  · back pain  · diarrhea  · dizziness  · general ill feeling or flu-like symptoms  · headache  · loss of appetite  · nausea, vomiting  This list may not describe all possible side effects. Call your doctor for medical advice about side effects. You may report side effects to FDA at 5-837-FDA-0304.  Where should I keep my medicine?  Keep out of the reach of children.  Store at room temperature between 15 and 30 degrees C (59 and 86 degrees F). Throw away any unused medicine after the expiration date.  NOTE: This sheet is a summary. It may not cover all possible information. If you have questions about this medicine, talk to your doctor, pharmacist, or health care provider.  © 2021 [employer] (2017-01-19 09:35:23)     01:24

## 2022-03-02 ENCOUNTER — TRANSCRIBE ORDERS (OUTPATIENT)
Dept: ADMINISTRATIVE | Facility: HOSPITAL | Age: 47
End: 2022-03-02

## 2022-03-02 DIAGNOSIS — Z01.818 OTHER SPECIFIED PRE-OPERATIVE EXAMINATION: Primary | ICD-10-CM

## 2022-03-04 DIAGNOSIS — Z12.11 ENCOUNTER FOR SCREENING FOR MALIGNANT NEOPLASM OF COLON: Primary | ICD-10-CM

## 2022-03-08 ENCOUNTER — LAB (OUTPATIENT)
Dept: LAB | Facility: HOSPITAL | Age: 47
End: 2022-03-08

## 2022-03-08 DIAGNOSIS — Z01.818 OTHER SPECIFIED PRE-OPERATIVE EXAMINATION: ICD-10-CM

## 2022-03-08 PROCEDURE — U0004 COV-19 TEST NON-CDC HGH THRU: HCPCS

## 2022-03-08 PROCEDURE — U0005 INFEC AGEN DETEC AMPLI PROBE: HCPCS

## 2022-03-08 PROCEDURE — C9803 HOPD COVID-19 SPEC COLLECT: HCPCS

## 2022-03-09 ENCOUNTER — TELEPHONE (OUTPATIENT)
Dept: FAMILY MEDICINE CLINIC | Facility: CLINIC | Age: 47
End: 2022-03-09

## 2022-03-09 LAB — SARS-COV-2 ORF1AB RESP QL NAA+PROBE: NOT DETECTED

## 2022-03-09 NOTE — TELEPHONE ENCOUNTER
Caller: Aj Plaza    Relationship: Self    Best call back number: [phone]     What orders are you requesting (i.e. lab or imaging): MRI LUMBAR SPINE AND NECK    In what timeframe would the patient need to come in: ASAP    Where will you receive your lab/imaging services: [facility]    Additional notes: PATIENT HAS APPOINTMENT WITH PAIN MANAGEMENT 3/16/22 AND HIS PREVIOUS PAIN MANAGEMENT PROVIDERS ARE EITHER NOT WILLING TO SEND HIS RECORDS TO US, OR CLOSED AND NOT ABLE TO GET IN TOUCH WITH THE OFFICE TO REQUEST THE RECORDS. HE WOULD LIKE TO GO AHEAD AND GET NEW IMAGING DONE SO HE CAN HAVE THEM DONE BEFORE HIS APPOINTMENT NEXT WEEK.

## 2022-03-09 NOTE — TELEPHONE ENCOUNTER
Please call patient and ask if pain management specifically requested imaging prior to his appointment or if patient wants to get imaging in order to provide pain management additional information.  Pain management will typically tell us if they need additional imaging before an appointment.  I have not received any sort of notification to indicate that imaging has been requested prior to his appointment.

## 2022-03-09 NOTE — TELEPHONE ENCOUNTER
Interventional Radiology Preprocedure Note    History/Indication for procedure:   Hanna Inman is a 31 y o  female with a PMH of R iliopsoas collection, s/p drain insertion several days ago  The catheter has not grown anything to date  Imaging findings suggest hematoma  She has been having pain at the insertion site  She presents for tube check  /57 (BP Location: Right arm)   Pulse 81   Temp 98 4 °F (36 9 °C) (Oral)   Resp 16   Ht 5' 9" (1 753 m)   Wt 96 2 kg (212 lb)   LMP 12/04/2019   SpO2 99%   BMI 31 31 kg/m²     Relevant past medical history:    History reviewed  No pertinent past medical history  There is no problem list on file for this patient  Medications:    Inpatient Medications:     Scheduled Medications:      Infusions:    No current facility-administered medications for this encounter  PRN:      Outpatient Medications:  No current facility-administered medications on file prior to encounter        Current Outpatient Medications on File Prior to Encounter   Medication Sig Dispense Refill    metroNIDAZOLE (FLAGYL) 500 mg tablet Take 1 tablet (500 mg total) by mouth every 8 (eight) hours for 10 days 30 tablet 0    sulfamethoxazole-trimethoprim (BACTRIM DS) 800-160 mg per tablet Take 1 tablet by mouth every 12 (twelve) hours for 10 days 20 tablet 0       No Known Allergies    Anticoagulants: none    Labs:   CBC with diff: Lab Results   Component Value Date    WBC 14 21 (H) 12/26/2019    HGB 11 3 (L) 12/26/2019    HCT 40 5 12/26/2019    MCV 66 (L) 12/26/2019     12/26/2019    MCH 18 5 (L) 12/26/2019    MCHC 27 9 (L) 12/26/2019    RDW 20 6 (H) 12/26/2019    MPV 10 1 12/26/2019    NRBC 0 12/26/2019     BMP/CMP:  Lab Results   Component Value Date    K 3 9 12/26/2019     12/26/2019    CO2 26 12/26/2019    BUN 11 12/26/2019    CREATININE 0 74 12/26/2019    CALCIUM 8 9 12/26/2019    AST 14 12/26/2019    ALT 22 12/26/2019    ALKPHOS 122 (H) 12/26/2019    EGFR 108 Patient is wanting to have imaging done to provide to pain management. He will call pain management to see if they would like imaging prior to appt.    12/26/2019   ,     Coags:   Lab Results   Component Value Date    PTT 28 12/23/2019    INR 1 05 12/23/2019   ,   Results from last 7 days   Lab Units 12/23/19  1712   PTT seconds 28   INR  1 05        Relevant imaging studies:   Reviewed  Directed physical examination:  I agree with the physical exam performed on 12/26/19 and there are no additional changes  Assessment/Plan: For tube check  Sedation/Anesthesia plan:  Local sedation will be used as needed for procedure  Consent with alternatives to the procedure, risks and benefits have been explained and discussed with the patient/patient's family: Continuation of care

## 2022-03-10 ENCOUNTER — HOSPITAL ENCOUNTER (OUTPATIENT)
Dept: RESPIRATORY THERAPY | Facility: HOSPITAL | Age: 47
Discharge: HOME | End: 2022-03-10
Admitting: FAMILY MEDICINE

## 2022-03-10 DIAGNOSIS — J42 CHRONIC BRONCHITIS, UNSPECIFIED CHRONIC BRONCHITIS TYPE: ICD-10-CM

## 2022-03-10 PROCEDURE — 94729 DIFFUSING CAPACITY: CPT

## 2022-03-10 PROCEDURE — 94010 BREATHING CAPACITY TEST: CPT

## 2022-03-10 PROCEDURE — 94727 GAS DIL/WSHOT DETER LNG VOL: CPT

## 2022-03-15 ENCOUNTER — TELEPHONE (OUTPATIENT)
Dept: FAMILY MEDICINE CLINIC | Facility: CLINIC | Age: 47
End: 2022-03-15

## 2022-03-15 NOTE — TELEPHONE ENCOUNTER
PATIENT STATES HE DOES NOT REQUIRE A CALL BACK FROM EARLIER CALL. WILL WAIT UNTIL APPOINTMENT IN MAY TO DISCUSS.    CALL BACK [phone]

## 2022-03-16 ENCOUNTER — OFFICE VISIT (OUTPATIENT)
Dept: PAIN MEDICINE | Facility: CLINIC | Age: 47
End: 2022-03-16

## 2022-03-16 VITALS
SYSTOLIC BLOOD PRESSURE: 159 MMHG | DIASTOLIC BLOOD PRESSURE: 101 MMHG | RESPIRATION RATE: 18 BRPM | OXYGEN SATURATION: 98 % | HEART RATE: 92 BPM | WEIGHT: 208 LBS | BODY MASS INDEX: 28.21 KG/M2

## 2022-03-16 DIAGNOSIS — E11.42 DIABETIC POLYNEUROPATHY ASSOCIATED WITH TYPE 2 DIABETES MELLITUS: Primary | ICD-10-CM

## 2022-03-16 DIAGNOSIS — Z79.899 HIGH RISK MEDICATION USE: Primary | ICD-10-CM

## 2022-03-16 PROCEDURE — 99204 OFFICE O/P NEW MOD 45 MIN: CPT | Performed by: STUDENT IN AN ORGANIZED HEALTH CARE EDUCATION/TRAINING PROGRAM

## 2022-03-16 RX ORDER — GABAPENTIN 300 MG/1
300 CAPSULE ORAL 3 TIMES DAILY
Qty: 90 CAPSULE | Refills: 0 | Status: SHIPPED | OUTPATIENT
Start: 2022-03-16 | End: 2022-04-13 | Stop reason: SDUPTHER

## 2022-03-16 RX ORDER — TRAMADOL HYDROCHLORIDE 50 MG/1
50 TABLET ORAL EVERY 8 HOURS PRN
Qty: 90 TABLET | Refills: 0 | Status: SHIPPED | OUTPATIENT
Start: 2022-03-16 | End: 2022-04-13 | Stop reason: SDUPTHER

## 2022-03-16 NOTE — PROGRESS NOTES
Patient screened positive for depression based on a PHQ-9 score of 16 on 3/16/2022. Follow-up recommendations include: Elevated PHQ score reflective of acute illness, not depression     CHIEF COMPLAINT  Chief Complaint   Patient presents with   • Back Pain     Chronic back, knee and neck pain due to work injury 2005, No narcotics                                                                              Primary Care  Nimo Rodas MD    Subjective   Aj Plaza is a 46 y.o. male  who presents for generalized pain.  He states that he has had pain in the neck, mid back, low back, knees for many years.  It appears that he was previously being treated at an outside pain clinic approximately 10 years ago with a combination of narcotics however he is no longer at the clinic for unknown reasons.  He states that most recently, he was smoking marijuana for pain control however he has subsequently developed lung issues and can no longer smoke marijuana.  He presents today to establish care.  He describes pain essentially all over his body.  He cannot identify any specific areas of pain which are of more concern than the others.    History of Present Illness     Location: Generalized pain  Onset: Many years ago  Duration: Progressively worsening  Timing: Constant throughout the day  Quality: Numbness and tingling in the hands with sharp, stabbing pains in the legs and feet  Severity: Today: 6       Last Week: 6       Worst: 8  Modifying Factors: The pain is fairly constant without any exacerbating or relieving factors    Physical Therapy: no    Interval Update 03/16/2022:     The following portions of the patient's history were reviewed and updated as appropriate: allergies, current medications, past family history, past medical history, past social history, past surgical history and problem list.      Current Outpatient Medications:   •  albuterol sulfate  (90 Base) MCG/ACT inhaler, , Disp: , Rfl:   •   atorvastatin (LIPITOR) 40 MG tablet, Take 1 tablet by mouth Every Night., Disp: 90 tablet, Rfl: 1  •  B-D UF III MINI PEN NEEDLES 31G X 5 MM misc, , Disp: , Rfl:   •  Budeson-Glycopyrrol-Formoterol (BREZTRI) 160-9-4.8 MCG/ACT aerosol inhaler, Inhale 2 puffs 2 (Two) Times a Day. Rinse mouth after each use., Disp: 10.7 g, Rfl: 5  •  EPINEPHrine (EPIPEN) 0.3 MG/0.3ML solution auto-injector injection, , Disp: , Rfl:   •  insulin detemir (Levemir FlexTouch) 100 UNIT/ML injection, Inject 20 Units under the skin into the appropriate area as directed Daily., Disp: 6 pen, Rfl: 1  •  losartan (COZAAR) 50 MG tablet, Take 1 tablet by mouth Daily., Disp: 90 tablet, Rfl: 1  •  SITagliptin-metFORMIN HCl ER (Janumet XR)  MG tablet, Take 2 tablets by mouth Daily., Disp: 180 tablet, Rfl: 1  •  Vitamin D, Ergocalciferol, 50 MCG (2000 UT) capsule, Take 2,000 Units by mouth Daily., Disp: 100 capsule, Rfl: 4  •  gabapentin (NEURONTIN) 300 MG capsule, Take 1 capsule by mouth 3 (Three) Times a Day., Disp: 90 capsule, Rfl: 0  •  traMADol (ULTRAM) 50 MG tablet, Take 1 tablet by mouth Every 8 (Eight) Hours As Needed for Moderate Pain ., Disp: 90 tablet, Rfl: 0    Review of Systems   Musculoskeletal: Positive for arthralgias, back pain, myalgias and neck pain. Negative for gait problem and neck stiffness.   Neurological: Positive for numbness. Negative for weakness.       Vitals:    03/16/22 1426   BP: (!) 159/101   Pulse: 92   Resp: 18   SpO2: 98%   Weight: 94.3 kg (208 lb)   PainSc:   7       Urine Drug Screen: 3/16/2022  Appropriate: Pending    Objective   Physical Exam  Vitals and nursing note reviewed. Exam conducted with a chaperone present.   Constitutional:       General: He is not in acute distress.     Appearance: Normal appearance. He is normal weight.   Neurological:      General: No focal deficit present.      Mental Status: He is alert. Mental status is at baseline.      Sensory: No sensory deficit.      Motor: No  weakness.           Assessment/Plan   Problems Addressed this Visit    None     Visit Diagnoses     Diabetic polyneuropathy associated with type 2 diabetes mellitus (HCC)    -  Primary    Relevant Medications    traMADol (ULTRAM) 50 MG tablet    Other Relevant Orders    MRI Cervical Spine Without Contrast    Ambulatory Referral to Orthopedic Surgery (Completed)    MRI Lumbar Spine Without Contrast      Diagnoses       Codes Comments    Diabetic polyneuropathy associated with type 2 diabetes mellitus (HCC)    -  Primary ICD-10-CM: E11.42  ICD-9-CM: 250.60, 357.2           Plan:  1. He does carry diagnosis of diabetes with most recent A1c of 7.1.  This likely contributes to significant amount of his pain.  He is also complaining of some tingling in the hands bilaterally.  He states that in the distant past, he did have an MRI of the lumbar spine which showed what he describes as a pinched nerve but it does not appear that he ever received any treatment for this  2. We will obtain repeat MRI of the cervical spine as well as the low back  3. Start tramadol 50 mg 3 times daily per patient request  4. Start gabapentin 300 mg 3 times daily  5. He states that he was evaluated by orthopedic surgery in the distant past for bilateral knee pain however he refused surgery at that time.  We will refer back to orthopedic surgery for knee pain  6. UDS today.  Inspect appropriate  --- Follow-up 1 month           INSPECT REPORT    As part of the patient's treatment plan, I may be prescribing controlled substances. The patient has been made aware of appropriate use of such medications, including potential risk of somnolence, limited ability to drive and/or work safely, and the potential for dependence or overdose. It has also bee made clear that these medications are for use by this patient only, without concomitant use of alcohol or other substances unless prescribed.     Patient has completed prescribing agreement detailing terms of  continued prescribing of controlled substances, including monitoring JAI reports, urine drug screening, and pill counts if necessary. The patient is aware that inappropriate use will results in cessation of prescribing such medications.    INSPECT report has been reviewed and scanned into the patient's chart.    As the clinician, I personally reviewed the INSPECT from 3/15/2022.    History and physical exam exhibit continued safe and appropriate use of controlled substances.      EMR Dragon/Transcription disclaimer:   Much of this encounter note is an electronic transcription/translation of spoken language to printed text. The electronic translation of spoken language may permit erroneous, or at times, nonsensical words or phrases to be inadvertently transcribed; Although I have reviewed the note for such errors, some may still exist.       .

## 2022-03-24 ENCOUNTER — PATIENT ROUNDING (BHMG ONLY) (OUTPATIENT)
Dept: PAIN MEDICINE | Facility: CLINIC | Age: 47
End: 2022-03-24

## 2022-03-24 NOTE — PROGRESS NOTES
March 24, 2022    Hello, may I speak with Aj Plaza?    My name is Claire    I am  with [facility]  [address]  [place] [address].    Before we get started may I verify your date of birth? 1975    I am calling to officially welcome you to our practice and ask about your recent visit. Is this a good time to talk? yes    Tell me about your visit with us. What things went well?  Everything went well.       We're always looking for ways to make our patients' experiences even better. Do you have recommendations on ways we may improve?  no    Overall were you satisfied with your first visit to our practice? yes       I appreciate you taking the time to speak with me today. Is there anything else I can do for you? no      Thank you, and have a great day.

## 2022-03-30 ENCOUNTER — TELEPHONE (OUTPATIENT)
Dept: FAMILY MEDICINE CLINIC | Facility: CLINIC | Age: 47
End: 2022-03-30

## 2022-03-30 DIAGNOSIS — J42 CHRONIC BRONCHITIS, UNSPECIFIED CHRONIC BRONCHITIS TYPE: Primary | ICD-10-CM

## 2022-03-30 RX ORDER — ROFLUMILAST 250 UG/1
250 TABLET ORAL DAILY
Qty: 90 TABLET | Refills: 1 | Status: SHIPPED | OUTPATIENT
Start: 2022-03-30 | End: 2022-07-29 | Stop reason: SDUPTHER

## 2022-03-30 NOTE — TELEPHONE ENCOUNTER
Please call patient and let him know that the last time we talked he had mentioned that Daliresp was helping his symptoms.  I do not feel comfortable increasing his dose.  If he feels like he needs an increase in dose, I recommend speaking with pulmonology.  I have sent a refill to his pharmacy.

## 2022-03-30 NOTE — TELEPHONE ENCOUNTER
Caller: Aj Plaza    Relationship: Self    Best call back number: [phone]    What medication are you requesting: DALIREST 500MG    If a prescription is needed, what is your preferred pharmacy and phone number: [id number] - [place], [place] - [address] AT [address] - [phone]  - [phone]      Additional notes: PATIENT STATES HE HAD PREVIOUSLY SPOKEN WITH DR GONZALEZ ABOUT UPPING HIS CURRENT DOSAGE OF MEDICATION TO 500MG AFTER FIRST ROUND. STATES MEDICATION IS WORKING WELL. YOLANDA AND ADVISE IF NECESSARY

## 2022-04-12 ENCOUNTER — HOSPITAL ENCOUNTER (OUTPATIENT)
Dept: MRI IMAGING | Facility: HOSPITAL | Age: 47
Discharge: HOME | End: 2022-04-12

## 2022-04-12 DIAGNOSIS — E11.42 DIABETIC POLYNEUROPATHY ASSOCIATED WITH TYPE 2 DIABETES MELLITUS: ICD-10-CM

## 2022-04-12 PROCEDURE — 72148 MRI LUMBAR SPINE W/O DYE: CPT

## 2022-04-12 PROCEDURE — 72141 MRI NECK SPINE W/O DYE: CPT

## 2022-04-13 ENCOUNTER — TELEPHONE (OUTPATIENT)
Dept: PAIN MEDICINE | Facility: CLINIC | Age: 47
End: 2022-04-13

## 2022-04-13 DIAGNOSIS — E11.42 DIABETIC POLYNEUROPATHY ASSOCIATED WITH TYPE 2 DIABETES MELLITUS: ICD-10-CM

## 2022-04-13 RX ORDER — TRAMADOL HYDROCHLORIDE 50 MG/1
50 TABLET ORAL EVERY 8 HOURS PRN
Qty: 90 TABLET | Refills: 0 | Status: SHIPPED | OUTPATIENT
Start: 2022-04-13 | End: 2022-04-20 | Stop reason: SDUPTHER

## 2022-04-13 RX ORDER — GABAPENTIN 300 MG/1
300 CAPSULE ORAL 3 TIMES DAILY
Qty: 90 CAPSULE | Refills: 0 | Status: SHIPPED | OUTPATIENT
Start: 2022-04-13 | End: 2022-04-20 | Stop reason: SDUPTHER

## 2022-04-13 NOTE — TELEPHONE ENCOUNTER
Caller: PATIENT     Relationship: SELF     Best call back number: [phone]    Requested Prescriptions:     GABAPENTIN 300 MG.     TRAMADOL 50 MG.        Pharmacy where request should be sent:  [facility]- [phone]    Additional details provided by patient: PT. HAS FOLLOW UP APPT. NEXT WEEK WITH DR. COYNE.  HE STATES THAT HE IS DUE FOR REFILLS ON GABAPENTIN AND TRAMADOL ON Friday OR Saturday.   PLEASE CALL TO LET PT. KNOW.       Does the patient have less than a 3 day supply:  [] Yes  [] No

## 2022-04-13 NOTE — TELEPHONE ENCOUNTER
Rx Refill Note  Requested Prescriptions     Pending Prescriptions Disp Refills   • traMADol (ULTRAM) 50 MG tablet 90 tablet 0     Sig: Take 1 tablet by mouth Every 8 (Eight) Hours As Needed for Moderate Pain .      Last office visit with prescribing clinician: 3/16/2022      Next office visit with prescribing clinician: 4/20/2022            Criss Jay MA  04/13/22, 11:39 EDT

## 2022-04-13 NOTE — TELEPHONE ENCOUNTER
Rx Refill Note  Requested Prescriptions     Pending Prescriptions Disp Refills   • gabapentin (NEURONTIN) 300 MG capsule 90 capsule 0     Sig: Take 1 capsule by mouth 3 (Three) Times a Day.      Last office visit with prescribing clinician: 3/16/2022      Next office visit with prescribing clinician: 4/20/2022            Criss Jay MA  04/13/22, 08:12 EDT

## 2022-04-20 ENCOUNTER — OFFICE VISIT (OUTPATIENT)
Dept: PAIN MEDICINE | Facility: CLINIC | Age: 47
End: 2022-04-20

## 2022-04-20 VITALS
RESPIRATION RATE: 16 BRPM | OXYGEN SATURATION: 97 % | BODY MASS INDEX: 27.9 KG/M2 | SYSTOLIC BLOOD PRESSURE: 128 MMHG | WEIGHT: 206 LBS | DIASTOLIC BLOOD PRESSURE: 94 MMHG | HEIGHT: 72 IN | HEART RATE: 101 BPM

## 2022-04-20 DIAGNOSIS — E11.42 DIABETIC POLYNEUROPATHY ASSOCIATED WITH TYPE 2 DIABETES MELLITUS: ICD-10-CM

## 2022-04-20 DIAGNOSIS — M51.26 DISPLACEMENT OF LUMBAR INTERVERTEBRAL DISC WITHOUT MYELOPATHY: ICD-10-CM

## 2022-04-20 DIAGNOSIS — M50.20 DISPLACEMENT OF CERVICAL INTERVERTEBRAL DISC WITHOUT MYELOPATHY: Primary | ICD-10-CM

## 2022-04-20 PROCEDURE — 99214 OFFICE O/P EST MOD 30 MIN: CPT | Performed by: STUDENT IN AN ORGANIZED HEALTH CARE EDUCATION/TRAINING PROGRAM

## 2022-04-20 RX ORDER — TRAMADOL HYDROCHLORIDE 50 MG/1
50 TABLET ORAL EVERY 6 HOURS PRN
Qty: 120 TABLET | Refills: 0 | Status: SHIPPED | OUTPATIENT
Start: 2022-04-20 | End: 2022-06-06 | Stop reason: SDUPTHER

## 2022-04-20 RX ORDER — GABAPENTIN 300 MG/1
300 CAPSULE ORAL 3 TIMES DAILY
Qty: 90 CAPSULE | Refills: 0 | Status: SHIPPED | OUTPATIENT
Start: 2022-04-20 | End: 2022-06-29

## 2022-04-20 NOTE — PROGRESS NOTES
Patient screened positive for depression based on a PHQ-9 score of 16 on 3/16/2022. Follow-up recommendations include: Elevated PHQ score reflective of acute illness, not depression     CHIEF COMPLAINT  Chief Complaint   Patient presents with   • Neck Pain     Tramadol 4/20 11am--   no cbd use--   • Back Pain   • Knee Pain     H/o scope       Primary Care  Nimo Rodas MD    Subjective   Aj Plaza is a 47 y.o. male  who presents for generalized pain.  He states that he has had pain in the neck, mid back, low back, knees for many years.  It appears that he was previously being treated at an outside pain clinic approximately 10 years ago with a combination of narcotics however he is no longer at the clinic for unknown reasons.  He states that most recently, he was smoking marijuana for pain control however he has subsequently developed lung issues and can no longer smoke marijuana.  He presents today to establish care.  He describes pain essentially all over his body.  He cannot identify any specific areas of pain which are of more concern than the others.    Neck Pain   Associated symptoms include numbness. Pertinent negatives include no weakness.   Back Pain  Associated symptoms include numbness. Pertinent negatives include no weakness.   Knee Pain   Associated symptoms include numbness.        Location: Generalized pain  Onset: Many years ago  Duration: Progressively worsening  Timing: Constant throughout the day  Quality: Numbness and tingling in the hands with sharp, stabbing pains in the legs and feet  Severity: Today: 6       Last Week: 6       Worst: 8  Modifying Factors: The pain is fairly constant without any exacerbating or relieving factors    Physical Therapy: no    Interval Update 04/20/2022: I reviewed his cervical and lumbar MRIs.  He has a very significant disc extrusion at C5-6 which extends up to the body of C5 and down to the body of C6.  This compresses the cord and is likely the  cause of the majority of his neck pain.  Also reviewed his lumbar MRI which does show some disc displacement and foraminal stenosis especially L5-S1.    The following portions of the patient's history were reviewed and updated as appropriate: allergies, current medications, past family history, past medical history, past social history, past surgical history and problem list.      Current Outpatient Medications:   •  albuterol sulfate  (90 Base) MCG/ACT inhaler, , Disp: , Rfl:   •  atorvastatin (LIPITOR) 40 MG tablet, Take 1 tablet by mouth Every Night., Disp: 90 tablet, Rfl: 1  •  B-D UF III MINI PEN NEEDLES 31G X 5 MM misc, , Disp: , Rfl:   •  Budeson-Glycopyrrol-Formoterol (BREZTRI) 160-9-4.8 MCG/ACT aerosol inhaler, Inhale 2 puffs 2 (Two) Times a Day. Rinse mouth after each use., Disp: 10.7 g, Rfl: 5  •  EPINEPHrine (EPIPEN) 0.3 MG/0.3ML solution auto-injector injection, , Disp: , Rfl:   •  gabapentin (NEURONTIN) 300 MG capsule, Take 1 capsule by mouth 3 (Three) Times a Day., Disp: 90 capsule, Rfl: 0  •  insulin detemir (Levemir FlexTouch) 100 UNIT/ML injection, Inject 20 Units under the skin into the appropriate area as directed Daily., Disp: 6 pen, Rfl: 1  •  losartan (COZAAR) 50 MG tablet, Take 1 tablet by mouth Daily., Disp: 90 tablet, Rfl: 1  •  roflumilast (DALIRESP) 250 MCG tablet tablet, Take 1 tablet by mouth Daily., Disp: 90 tablet, Rfl: 1  •  SITagliptin-metFORMIN HCl ER (Janumet XR)  MG tablet, Take 2 tablets by mouth Daily., Disp: 180 tablet, Rfl: 1  •  traMADol (ULTRAM) 50 MG tablet, Take 1 tablet by mouth Every 6 (Six) Hours As Needed for Moderate Pain ., Disp: 120 tablet, Rfl: 0  •  Vitamin D, Ergocalciferol, 50 MCG (2000 UT) capsule, Take 2,000 Units by mouth Daily., Disp: 100 capsule, Rfl: 4    Review of Systems   Musculoskeletal: Positive for arthralgias, back pain, myalgias and neck pain. Negative for gait problem and neck stiffness.   Neurological: Positive for numbness.  "Negative for weakness.       Vitals:    04/20/22 1440   BP: 128/94   Pulse: 101   Resp: 16   SpO2: 97%   Weight: 93.4 kg (206 lb)   Height: 182.9 cm (72\")   PainSc:   5       Urine Drug Screen: 3/16/2022  Appropriate: Positive for marijuana    Objective   Physical Exam  Vitals and nursing note reviewed. Exam conducted with a chaperone present.   Constitutional:       General: He is not in acute distress.     Appearance: Normal appearance. He is normal weight.   Neurological:      General: No focal deficit present.      Mental Status: He is alert. Mental status is at baseline.      Sensory: No sensory deficit.      Motor: No weakness.           Assessment/Plan   Problems Addressed this Visit    None     Visit Diagnoses     Displacement of cervical intervertebral disc without myelopathy    -  Primary    Relevant Orders    Epidural Block    Diabetic polyneuropathy associated with type 2 diabetes mellitus (HCC)        Relevant Medications    traMADol (ULTRAM) 50 MG tablet    Displacement of lumbar intervertebral disc without myelopathy        Relevant Orders    Epidural Block      Diagnoses       Codes Comments    Displacement of cervical intervertebral disc without myelopathy    -  Primary ICD-10-CM: M50.20  ICD-9-CM: 722.0     Diabetic polyneuropathy associated with type 2 diabetes mellitus (HCC)     ICD-10-CM: E11.42  ICD-9-CM: 250.60, 357.2     Displacement of lumbar intervertebral disc without myelopathy     ICD-10-CM: M51.26  ICD-9-CM: 722.10           Plan:  1. Plan for cervical followed by lumbar epidural steroid injections for cervical disc extrusion and lumbar foraminal stenosis  2. Increase tramadol to 50 mg 4 times daily per patient request  3. Continue gabapentin 300 mg 3 times daily  4. He states that he was evaluated by orthopedic surgery in the distant past for bilateral knee pain however he refused surgery at that time.  We will refer back to orthopedic surgery for knee pain  5. UDS positive for " marijuana.  Inspect appropriate  --- Follow-up next available for cervical followed by lumbar epidural steroid injection           INSPECT REPORT    As part of the patient's treatment plan, I may be prescribing controlled substances. The patient has been made aware of appropriate use of such medications, including potential risk of somnolence, limited ability to drive and/or work safely, and the potential for dependence or overdose. It has also bee made clear that these medications are for use by this patient only, without concomitant use of alcohol or other substances unless prescribed.     Patient has completed prescribing agreement detailing terms of continued prescribing of controlled substances, including monitoring JAI reports, urine drug screening, and pill counts if necessary. The patient is aware that inappropriate use will results in cessation of prescribing such medications.    INSPECT report has been reviewed and scanned into the patient's chart.    As the clinician, I personally reviewed the INSPECT from 4/20/2022.    History and physical exam exhibit continued safe and appropriate use of controlled substances.      EMR Dragon/Transcription disclaimer:   Much of this encounter note is an electronic transcription/translation of spoken language to printed text. The electronic translation of spoken language may permit erroneous, or at times, nonsensical words or phrases to be inadvertently transcribed; Although I have reviewed the note for such errors, some may still exist.       .

## 2022-05-02 ENCOUNTER — HOSPITAL ENCOUNTER (OUTPATIENT)
Dept: PAIN MEDICINE | Facility: HOSPITAL | Age: 47
Discharge: HOME | End: 2022-05-02

## 2022-05-02 VITALS
OXYGEN SATURATION: 100 % | TEMPERATURE: 97.7 F | DIASTOLIC BLOOD PRESSURE: 83 MMHG | WEIGHT: 200 LBS | SYSTOLIC BLOOD PRESSURE: 120 MMHG | RESPIRATION RATE: 16 BRPM | HEART RATE: 66 BPM | BODY MASS INDEX: 27.09 KG/M2 | HEIGHT: 72 IN

## 2022-05-02 DIAGNOSIS — R52 PAIN: ICD-10-CM

## 2022-05-02 DIAGNOSIS — M50.20 DISPLACEMENT OF CERVICAL INTERVERTEBRAL DISC WITHOUT MYELOPATHY: ICD-10-CM

## 2022-05-02 PROCEDURE — 77003 FLUOROGUIDE FOR SPINE INJECT: CPT

## 2022-05-02 PROCEDURE — 0 IOPAMIDOL 41 % SOLUTION: Performed by: STUDENT IN AN ORGANIZED HEALTH CARE EDUCATION/TRAINING PROGRAM

## 2022-05-02 PROCEDURE — 62321 NJX INTERLAMINAR CRV/THRC: CPT | Performed by: STUDENT IN AN ORGANIZED HEALTH CARE EDUCATION/TRAINING PROGRAM

## 2022-05-02 PROCEDURE — 25010000002 METHYLPREDNISOLONE PER 40 MG: Performed by: STUDENT IN AN ORGANIZED HEALTH CARE EDUCATION/TRAINING PROGRAM

## 2022-05-02 RX ORDER — BUPIVACAINE HYDROCHLORIDE 5 MG/ML
10 INJECTION, SOLUTION EPIDURAL; INTRACAUDAL ONCE
Status: COMPLETED | OUTPATIENT
Start: 2022-05-02 | End: 2022-05-02

## 2022-05-02 RX ORDER — METHYLPREDNISOLONE ACETATE 40 MG/ML
40 INJECTION, SUSPENSION INTRA-ARTICULAR; INTRALESIONAL; INTRAMUSCULAR; SOFT TISSUE ONCE
Status: COMPLETED | OUTPATIENT
Start: 2022-05-02 | End: 2022-05-02

## 2022-05-02 RX ADMIN — BUPIVACAINE HYDROCHLORIDE 3 ML: 5 INJECTION, SOLUTION EPIDURAL; INTRACAUDAL; PERINEURAL at 14:07

## 2022-05-02 RX ADMIN — METHYLPREDNISOLONE ACETATE 40 MG: 40 INJECTION, SUSPENSION INTRA-ARTICULAR; INTRALESIONAL; INTRAMUSCULAR; INTRASYNOVIAL; SOFT TISSUE at 14:07

## 2022-05-02 RX ADMIN — IOPAMIDOL 1.5 ML: 408 INJECTION, SOLUTION INTRATHECAL at 14:38

## 2022-05-02 NOTE — PROCEDURES
Cervical Epidural Steroid Injection  [facility]      PREOPERATIVE DIAGNOSIS:   Cervical Radiculopathy, Cervical Spinal Stenosis and Cervical Disc Displacement  POSTOPERATIVE DIAGNOSIS:  Same as preop diagnosis    PROCEDURE:   Cervical Epidural Steroid Injection, Therapeutic Translaminar Injection, with epidurogram, at   T1-T2 level    PRE-PROCEDURE DISCUSSION WITH PATIENT:    Risks and complications were discussed with the patient prior to starting the procedure and informed consent was obtained.  We discussed various topics including but not limited to bleeding, infection, injury, paralysis, nerve injury, dural puncture, coma, death, worsening of clinical picture, lack of pain relief, and postprocedural soreness.    SURGEON:  Sudhir Mathis MD    REASON FOR PROCEDURE:    Cervical disc extrusion     SEDATION:  Patient declined administration of moderate sedation    ANESTHETIC:  Marcaine 0.25%  STEROID:   Methylprednisolone (DEPO MEDROL) 40mg/ml    DESCRIPTON OF PROCEDURE:    After obtaining informed consent, I.V. was not started in the preop area.   The patient was taken to the operating room and placed in the prone position.  All pressure points were well padded.  The cervicothoracic spine area was prepped with Chloraprep and draped in a sterile fashion.  Under fluoroscopic guidance, the aforementioned  interlaminar space was identified. Skin and subcutaneous tissues were anesthetized with 1% lidocaine in the middle of the space. A Tuohy needle was introduced through the skin and advanced to this interlaminar space and into the epidural space under fluoroscopic guidance and verified with loss-of-resistance technique to air.  After confirming the position of the needle with the fluoroscope with all the views, and after aspiration was confirmed negative for blood and CSF, 1.5 mL of Omnipaque was injected.  After seeing appropriate epidurogram with lateral and PA views, a total of 3 cc solution was injected,  consisting of 3cc of local anesthetic as above, with normal saline and injectable steroid as above.     ESTIMATED BLOOD LOSS:  <5 mL  SPECIMENS:  None    COMPLICATIONS:     No complications were noted., There was no indication of vascular uptake on live injection of contrast dye., There was no indication of intrathecal uptake on live injection of contrast dye. and There was not any evidence of dural puncture.      TOLERANCE & DISCHARGE CONDITION:    The patient tolerated the procedure well.  The patient was transported to the recovery area without difficulties.  The patient was discharged to home under the care of family in stable and satisfactory condition.    PLAN OF CARE:  1. The patient was given our standard instruction sheet.  2. The patient will Return to clinic 4-6 wks.  3. The patient will resume all medications as per the medication reconciliation sheet.

## 2022-05-02 NOTE — DISCHARGE INSTRUCTIONS

## 2022-05-03 ENCOUNTER — TELEPHONE (OUTPATIENT)
Dept: PAIN MEDICINE | Facility: HOSPITAL | Age: 47
End: 2022-05-03

## 2022-05-03 NOTE — TELEPHONE ENCOUNTER
Post procedure phone call completed.  Pt states they are doing good and denies questions or concerns.             Sarah Valverde RN

## 2022-05-12 NOTE — PROGRESS NOTES
Subjective: Paresthesia of both hands    Patient ID: Aj Plaza is a 47 y.o. male.    CHIEF COMPLAINT:Paresthesia of both hands    History of Present Illness     This patient was actually supposed to be scheduled for an EMG of bilateral upper extremities and not a full neurology appointment.  This appointment did not get canceled.  The hub has been notified and the patient will be scheduled for the EMG and did not keep this appointment today.             omplaint: paresthesias/feet  Onset: 4 months ago  Location: all toes bilateral feet.   Quality: feels like-burning, tingling  Severity: 1 day -- 7 at night on scale of 0-10  Duration: 8-12 hrs   Frequency: every night   Timing: every night  Context: taking pressure off feet  Modifying factors: what makes it better--cold water  Associated Signs and symptoms:   burning  Current meds:gabapentin 100 mg  Has not decreased pain      DATE OF EXAM:  4/12/2022 1:48 PM   PROCEDURE:  MRI LUMBAR SPINE WO CONTRAST-   INDICATIONS:  Lumbar radiculopathy, > 6 wks; E11.42-Type 2 diabetes mellitus with  diabetic polyneuropathy   COMPARISON:  MRI lumbar spine 12/27/2012 images, no report available.   IMPRESSION:  Mild to moderate degenerative changes in the lower lumbar spine cause  right neural foraminal narrowing at L4-L5 and L5-S1, as described above.  No significant spinal canal stenosis.   Electronically Signed By-Leidy Quintero MD On:4/12/2022 4:07 PM  This report was finalized on [id number] by  Leidy Quintero MD.      MRI CERVICAL SPINE WO CONTRAST-   Date of Exam: 4/12/2022 1:45 PM   Indication: Cervical radiculopathy, no red flags; E11.42-Type 2 diabetes  mellitus with diabetic polyneuropathy   Comparison: None available.   IMPRESSION:  1. Degenerative changes of the cervical spine. The most significant  finding is at C4-5, where there is a right paracentral disc protrusion,  along with with superior and inferior disc extrusion in the right  paracentral region.  There is resulting severe canal stenosis with  flattening of the cervical spinal cord. No definite cord edema or  myelomalacia is seen.  2. Multilevel neural foraminal stenosis as described above. Moderate  neural foraminal stenosis is thought to be present on the left at C4-5,  bilaterally at C5-6, and on the left at C6-7.   Electronically Signed By-Gregoria Mora MD On:2022 3:40 PM  This report was finalized on [id number] by  Gregoria       The following portions of the patient's history were reviewed and updated as appropriate: allergies, current medications, past family history, past medical history, past social history, past surgical history and problem list.      Family History   Problem Relation Age of Onset   • Diabetes Maternal Grandmother    • Hypertension Maternal Grandmother    • Hyperlipidemia Maternal Grandmother    • Cancer Maternal Grandfather    • Cancer Father        Past Medical History:   Diagnosis Date   • Hyperlipidemia    • Hypertension    • Knee pain    • Low back pain    • Neck pain    • Type 2 diabetes mellitus (HCC)        Social History     Socioeconomic History   • Marital status:    Tobacco Use   • Smoking status: Former Smoker     Packs/day: 2.00     Years: 30.00     Pack years: 60.00     Types: Cigarettes     Quit date:      Years since quittin.3   • Smokeless tobacco: Former User   Vaping Use   • Vaping Use: Never used   Substance and Sexual Activity   • Alcohol use: Not Currently     Comment: new years   • Drug use: Not Currently     Types: Marijuana     Comment: used in the past 22   • Sexual activity: Not Currently     Partners: Female         Current Outpatient Medications:   •  albuterol sulfate  (90 Base) MCG/ACT inhaler, , Disp: , Rfl:   •  atorvastatin (LIPITOR) 40 MG tablet, Take 1 tablet by mouth Every Night., Disp: 90 tablet, Rfl: 1  •  B-D UF III MINI PEN NEEDLES 31G X 5 MM misc, , Disp: , Rfl:   •  Budeson-Glycopyrrol-Formoterol (BREZTRI)  160-9-4.8 MCG/ACT aerosol inhaler, Inhale 2 puffs 2 (Two) Times a Day. Rinse mouth after each use., Disp: 10.7 g, Rfl: 5  •  EPINEPHrine (EPIPEN) 0.3 MG/0.3ML solution auto-injector injection, , Disp: , Rfl:   •  gabapentin (NEURONTIN) 300 MG capsule, Take 1 capsule by mouth 3 (Three) Times a Day., Disp: 90 capsule, Rfl: 0  •  insulin detemir (Levemir FlexTouch) 100 UNIT/ML injection, Inject 20 Units under the skin into the appropriate area as directed Daily., Disp: 6 pen, Rfl: 1  •  losartan (COZAAR) 50 MG tablet, Take 1 tablet by mouth Daily., Disp: 90 tablet, Rfl: 1  •  roflumilast (DALIRESP) 250 MCG tablet tablet, Take 1 tablet by mouth Daily., Disp: 90 tablet, Rfl: 1  •  SITagliptin-metFORMIN HCl ER (Janumet XR)  MG tablet, Take 2 tablets by mouth Daily., Disp: 180 tablet, Rfl: 1  •  traMADol (ULTRAM) 50 MG tablet, Take 1 tablet by mouth Every 6 (Six) Hours As Needed for Moderate Pain ., Disp: 120 tablet, Rfl: 0  •  Vitamin D, Ergocalciferol, 50 MCG (2000 UT) capsule, Take 2,000 Units by mouth Daily., Disp: 100 capsule, Rfl: 4    Review of Systems     I have reviewed ROS completed by medical assistant.     Objective:    Neurologic Exam    Physical Exam    Assessment/Plan:    There are no diagnoses linked to this encounter.    No follow-ups on file.    I spent 1  minutes caring for Aj on this date of service. This time includes time spent by me in the following activities: The patient did not keep the appointment.      This document has been electronically signed by Jennyfer HEATON on May 12, 2022 10:10 EDT

## 2022-05-13 ENCOUNTER — TELEPHONE (OUTPATIENT)
Dept: FAMILY MEDICINE CLINIC | Facility: CLINIC | Age: 47
End: 2022-05-13

## 2022-05-13 DIAGNOSIS — R20.2 PARESTHESIA OF BOTH HANDS: Primary | ICD-10-CM

## 2022-05-13 NOTE — TELEPHONE ENCOUNTER
Gilles with [facility] neuro called, you referred him for bilateral upper extremity numbness and tingling. They have him scheduled for new pt appt however, they wanted to clarify if you was referring him for emg nerve conduction study because if so they can go ahead and get that scheduled. If so, can you please correct order. Current referral is just for consultation.

## 2022-05-13 NOTE — TELEPHONE ENCOUNTER
Please call neurology and let them know that I wanted patient to have an EMG to evaluate his numbness and tingling.  Additionally I was hoping that neurology would be able to review those results with patient.     Please ask if I need to correct the order and how I would go about correcting the order.

## 2022-05-16 ENCOUNTER — OFFICE VISIT (OUTPATIENT)
Dept: NEUROLOGY | Facility: CLINIC | Age: 47
End: 2022-05-16

## 2022-05-16 DIAGNOSIS — M79.601 PARESTHESIA AND PAIN OF BOTH UPPER EXTREMITIES: Primary | ICD-10-CM

## 2022-05-16 DIAGNOSIS — R20.2 PARESTHESIA AND PAIN OF BOTH UPPER EXTREMITIES: Primary | ICD-10-CM

## 2022-05-16 DIAGNOSIS — M79.602 PARESTHESIA AND PAIN OF BOTH UPPER EXTREMITIES: Primary | ICD-10-CM

## 2022-05-18 NOTE — PROGRESS NOTES
EMG and Nerve Conduction Studies    The complete report includes the data sheets.     Date of Study:5/20/22    Referring Provider:     History:BUE- NUMBNESS AND TINGLING    Results:    1.  The median sensory and motor nerve conduction studies were normal bilaterally.    2.  The ulnar sensory and motor nerve conduction studies were normal bilaterally.    3.  EMG of the muscles examined in the bilateral C5-T1 myotomes were normal.    Impression:    This is a normal study of the upper extremities.  There is no evidence of focal median neuropathy at the wrist, ulnar neuropathy at the elbow, or neurogenic change in the muscles examined in the C5-T1 myotomes bilaterally.      Electronically signed by :    Joseph Seipel, M.D.  May 20, 2022

## 2022-05-20 ENCOUNTER — PROCEDURE VISIT (OUTPATIENT)
Dept: NEUROLOGY | Facility: CLINIC | Age: 47
End: 2022-05-20

## 2022-05-20 DIAGNOSIS — R20.2 PARESTHESIA OF BOTH HANDS: Primary | ICD-10-CM

## 2022-05-20 PROCEDURE — 95910 NRV CNDJ TEST 7-8 STUDIES: CPT | Performed by: PSYCHIATRY & NEUROLOGY

## 2022-05-20 PROCEDURE — 95886 MUSC TEST DONE W/N TEST COMP: CPT | Performed by: PSYCHIATRY & NEUROLOGY

## 2022-05-23 ENCOUNTER — HOSPITAL ENCOUNTER (OUTPATIENT)
Dept: PAIN MEDICINE | Facility: HOSPITAL | Age: 47
Discharge: HOME | End: 2022-05-23

## 2022-05-23 VITALS
OXYGEN SATURATION: 97 % | HEIGHT: 72 IN | WEIGHT: 195 LBS | TEMPERATURE: 97.5 F | RESPIRATION RATE: 18 BRPM | BODY MASS INDEX: 26.41 KG/M2 | SYSTOLIC BLOOD PRESSURE: 128 MMHG | HEART RATE: 76 BPM | DIASTOLIC BLOOD PRESSURE: 86 MMHG

## 2022-05-23 DIAGNOSIS — R52 PAIN: ICD-10-CM

## 2022-05-23 DIAGNOSIS — M51.26 DISPLACEMENT OF LUMBAR INTERVERTEBRAL DISC WITHOUT MYELOPATHY: ICD-10-CM

## 2022-05-23 PROCEDURE — 62323 NJX INTERLAMINAR LMBR/SAC: CPT | Performed by: STUDENT IN AN ORGANIZED HEALTH CARE EDUCATION/TRAINING PROGRAM

## 2022-05-23 PROCEDURE — 25010000002 METHYLPREDNISOLONE PER 40 MG: Performed by: STUDENT IN AN ORGANIZED HEALTH CARE EDUCATION/TRAINING PROGRAM

## 2022-05-23 PROCEDURE — 77003 FLUOROGUIDE FOR SPINE INJECT: CPT

## 2022-05-23 PROCEDURE — 0 IOPAMIDOL 41 % SOLUTION: Performed by: STUDENT IN AN ORGANIZED HEALTH CARE EDUCATION/TRAINING PROGRAM

## 2022-05-23 RX ORDER — METHYLPREDNISOLONE ACETATE 40 MG/ML
40 INJECTION, SUSPENSION INTRA-ARTICULAR; INTRALESIONAL; INTRAMUSCULAR; SOFT TISSUE ONCE
Status: COMPLETED | OUTPATIENT
Start: 2022-05-23 | End: 2022-05-23

## 2022-05-23 RX ORDER — BUPIVACAINE HYDROCHLORIDE 2.5 MG/ML
10 INJECTION, SOLUTION EPIDURAL; INFILTRATION; INTRACAUDAL ONCE
Status: COMPLETED | OUTPATIENT
Start: 2022-05-23 | End: 2022-05-23

## 2022-05-23 RX ADMIN — METHYLPREDNISOLONE ACETATE 40 MG: 40 INJECTION, SUSPENSION INTRA-ARTICULAR; INTRALESIONAL; INTRAMUSCULAR; INTRASYNOVIAL; SOFT TISSUE at 14:04

## 2022-05-23 RX ADMIN — BUPIVACAINE HYDROCHLORIDE 10 ML: 2.5 INJECTION, SOLUTION EPIDURAL; INFILTRATION; INTRACAUDAL; PERINEURAL at 14:04

## 2022-05-23 RX ADMIN — IOPAMIDOL 3 ML: 408 INJECTION, SOLUTION INTRATHECAL at 14:04

## 2022-05-23 NOTE — PROCEDURES
Lumbar Epidural Steroid Injection  [facility]    PREOPERATIVE DIAGNOSIS:   Chronic low back pain, Lumbar Degenerative Disc Disease and Lumbar Disc Displacement  POSTOPERATIVE DIAGNOSIS:  Same as preop diagnosis    PROCEDURE:   Lumbar Epidural Steroid Injection, Therapeutic Translaminar Injection, with epidurogram, at  L5/S1 level    PRE-PROCEDURE DISCUSSION WITH PATIENT:    Risks and complications were discussed with the patient prior to starting the procedure and informed consent was obtained.  We discussed various topics including but not limited to bleeding, infection, injury, paralysis, nerve injury, dural puncture, coma, death, worsening of clinical picture, lack of pain relief, and postprocedural soreness.    SURGEON:  Sudhir Mathis MD    REASON FOR PROCEDURE:    Degenerative changes are noted in the area. and Stenotic area is noted, and is likely contributing to this chronic &/or recurrent pain.     SEDATION:  Patient declined administration of moderate sedation    ANESTHETIC:  Marcaine 0.25%  STEROID:   Methylprednisolone (DEPO MEDROL) 40mg/ml    DESCRIPTON OF PROCEDURE:    After obtaining informed consent, I.V. was not started in the preop area.   The patient was taken to the operating room and placed in the prone position.  All pressure points were well padded.  The lumbar spine area was prepped with Chloraprep and draped in a sterile fashion.  Under fluoroscopic guidance, the above mentioned interlaminar space was identified. Skin and subcutaneous tissues were anesthetized with 1% lidocaine in the middle of the space. A Tuohy needle was introduced through the skin and advanced to this interlaminar space and into the epidural space under fluoroscopic guidance and verified with loss-of-resistance technique to air.  After confirming the position of the needle with the fluoroscope with all the views, and after aspiration was confirmed negative for blood and CSF, 1.5 mL of Omnipaque was injected.   After seeing appropriate epidurogram with lateral and PA views, a total of 4 cc solution was injected, consisting of 3cc of local anesthetic as above, with normal saline and injectable steroid as above.     ESTIMATED BLOOD LOSS:  <5 mL  SPECIMENS:  None    COMPLICATIONS:     No complications were noted., There was no indication of vascular uptake on live injection of contrast dye., There was no indication of intrathecal uptake on live injection of contrast dye. and There was not any evidence of dural puncture.      TOLERANCE & DISCHARGE CONDITION:    The patient tolerated the procedure well.  The patient was transported to the recovery area without difficulties.  The patient was discharged to home under the care of family in stable and satisfactory condition.    PLAN OF CARE:  1. The patient was given our standard instruction sheet.  2. The patient will Return to clinic 4-6 wks  3. The patient will resume all medications as per the medication reconciliation sheet.

## 2022-05-23 NOTE — DISCHARGE INSTRUCTIONS

## 2022-05-24 ENCOUNTER — TELEPHONE (OUTPATIENT)
Dept: PAIN MEDICINE | Facility: HOSPITAL | Age: 47
End: 2022-05-24

## 2022-06-06 DIAGNOSIS — E11.42 DIABETIC POLYNEUROPATHY ASSOCIATED WITH TYPE 2 DIABETES MELLITUS: ICD-10-CM

## 2022-06-06 RX ORDER — TRAMADOL HYDROCHLORIDE 50 MG/1
50 TABLET ORAL EVERY 6 HOURS PRN
Qty: 120 TABLET | Refills: 0 | Status: SHIPPED | OUTPATIENT
Start: 2022-06-06 | End: 2022-06-29 | Stop reason: SDUPTHER

## 2022-06-29 ENCOUNTER — TELEPHONE (OUTPATIENT)
Dept: FAMILY MEDICINE CLINIC | Facility: CLINIC | Age: 47
End: 2022-06-29

## 2022-06-29 ENCOUNTER — OFFICE VISIT (OUTPATIENT)
Dept: PAIN MEDICINE | Facility: CLINIC | Age: 47
End: 2022-06-29

## 2022-06-29 VITALS
RESPIRATION RATE: 16 BRPM | BODY MASS INDEX: 26.41 KG/M2 | DIASTOLIC BLOOD PRESSURE: 94 MMHG | WEIGHT: 195 LBS | HEART RATE: 88 BPM | HEIGHT: 72 IN | OXYGEN SATURATION: 97 % | SYSTOLIC BLOOD PRESSURE: 157 MMHG

## 2022-06-29 DIAGNOSIS — M50.20 DISPLACEMENT OF CERVICAL INTERVERTEBRAL DISC WITHOUT MYELOPATHY: ICD-10-CM

## 2022-06-29 DIAGNOSIS — M51.26 DISPLACEMENT OF LUMBAR INTERVERTEBRAL DISC WITHOUT MYELOPATHY: ICD-10-CM

## 2022-06-29 DIAGNOSIS — E11.42 DIABETIC POLYNEUROPATHY ASSOCIATED WITH TYPE 2 DIABETES MELLITUS: Primary | ICD-10-CM

## 2022-06-29 PROCEDURE — 99214 OFFICE O/P EST MOD 30 MIN: CPT | Performed by: STUDENT IN AN ORGANIZED HEALTH CARE EDUCATION/TRAINING PROGRAM

## 2022-06-29 RX ORDER — TRAMADOL HYDROCHLORIDE 50 MG/1
50 TABLET ORAL EVERY 6 HOURS PRN
Qty: 120 TABLET | Refills: 0 | Status: SHIPPED | OUTPATIENT
Start: 2022-08-04 | End: 2022-08-31 | Stop reason: SDUPTHER

## 2022-06-29 RX ORDER — PREGABALIN 150 MG/1
150 CAPSULE ORAL 2 TIMES DAILY
Qty: 60 CAPSULE | Refills: 1 | Status: SHIPPED | OUTPATIENT
Start: 2022-06-29 | End: 2022-08-24 | Stop reason: SDUPTHER

## 2022-06-29 RX ORDER — TRAMADOL HYDROCHLORIDE 50 MG/1
50 TABLET ORAL EVERY 6 HOURS PRN
Qty: 120 TABLET | Refills: 0 | Status: SHIPPED | OUTPATIENT
Start: 2022-07-05 | End: 2022-08-04

## 2022-06-29 NOTE — TELEPHONE ENCOUNTER
When patient was called to scheduled for the EMG NCV REQUEST referral. Patient told schedulin/28 PER PT JUST HAD DONE AT THE END OF APRIL, ROUTING BACK     Wanted you to know and I see Dr Seipel has one in patient chart on 2022. Please advise

## 2022-06-29 NOTE — TELEPHONE ENCOUNTER
Please cancel order.  I had referred patient to neurology to have EMG done.  Later received a phone call to order EMG.  I do not know what happened in the interim.

## 2022-06-29 NOTE — PROGRESS NOTES
Patient screened positive for depression based on a PHQ-9 score of 16 on 3/16/2022. Follow-up recommendations include: Elevated PHQ score reflective of acute illness, not depression     CHIEF COMPLAINT  Chief Complaint   Patient presents with   • Back Pain   • Neck Pain     Tramadol LD 6/29@0900       Primary Care  Nimo Rodas MD    Subjective   Aj Plaza is a 47 y.o. male  who presents for generalized pain.  He states that he has had pain in the neck, mid back, low back, knees for many years.  It appears that he was previously being treated at an outside pain clinic approximately 10 years ago with a combination of narcotics however he is no longer at the clinic for unknown reasons.  He states that most recently, he was smoking marijuana for pain control however he has subsequently developed lung issues and can no longer smoke marijuana.  He presents today to establish care.  He describes pain essentially all over his body.  He cannot identify any specific areas of pain which are of more concern than the others.    Neck Pain   Associated symptoms include numbness. Pertinent negatives include no weakness.   Back Pain  Associated symptoms include numbness. Pertinent negatives include no weakness.   Knee Pain   Associated symptoms include numbness.        Location: Generalized pain  Onset: Many years ago  Duration: Progressively worsening  Timing: Constant throughout the day  Quality: Numbness and tingling in the hands with sharp, stabbing pains in the legs and feet  Severity: Today: 6       Last Week: 6       Worst: 8  Modifying Factors: The pain is fairly constant without any exacerbating or relieving factors    Physical Therapy: no    Interval Update 06/29/2022: He reports good benefit with the cervical epidural steroid injection with significant decrease in pain in his right arm.  He only got minimal benefit from the lumbar epidural.  Continues to get benefit from tramadol 4 times daily.    The  following portions of the patient's history were reviewed and updated as appropriate: allergies, current medications, past family history, past medical history, past social history, past surgical history and problem list.      Current Outpatient Medications:   •  albuterol sulfate  (90 Base) MCG/ACT inhaler, , Disp: , Rfl:   •  atorvastatin (LIPITOR) 40 MG tablet, Take 1 tablet by mouth Every Night., Disp: 90 tablet, Rfl: 1  •  B-D UF III MINI PEN NEEDLES 31G X 5 MM misc, , Disp: , Rfl:   •  Budeson-Glycopyrrol-Formoterol (BREZTRI) 160-9-4.8 MCG/ACT aerosol inhaler, Inhale 2 puffs 2 (Two) Times a Day. Rinse mouth after each use., Disp: 10.7 g, Rfl: 5  •  EPINEPHrine (EPIPEN) 0.3 MG/0.3ML solution auto-injector injection, , Disp: , Rfl:   •  insulin detemir (Levemir FlexTouch) 100 UNIT/ML injection, Inject 20 Units under the skin into the appropriate area as directed Daily., Disp: 6 pen, Rfl: 1  •  losartan (COZAAR) 50 MG tablet, Take 1 tablet by mouth Daily., Disp: 90 tablet, Rfl: 1  •  roflumilast (DALIRESP) 250 MCG tablet tablet, Take 1 tablet by mouth Daily., Disp: 90 tablet, Rfl: 1  •  SITagliptin-metFORMIN HCl ER (Janumet XR)  MG tablet, Take 2 tablets by mouth Daily., Disp: 180 tablet, Rfl: 1  •  [START ON 7/5/2022] traMADol (ULTRAM) 50 MG tablet, Take 1 tablet by mouth Every 6 (Six) Hours As Needed for Moderate Pain  for up to 30 days., Disp: 120 tablet, Rfl: 0  •  Vitamin D, Ergocalciferol, 50 MCG (2000 UT) capsule, Take 2,000 Units by mouth Daily., Disp: 100 capsule, Rfl: 4  •  pregabalin (LYRICA) 150 MG capsule, Take 1 capsule by mouth 2 (Two) Times a Day., Disp: 60 capsule, Rfl: 1  •  [START ON 8/4/2022] traMADol (ULTRAM) 50 MG tablet, Take 1 tablet by mouth Every 6 (Six) Hours As Needed for Moderate Pain ., Disp: 120 tablet, Rfl: 0    Review of Systems   Musculoskeletal: Positive for arthralgias, back pain, myalgias and neck pain. Negative for gait problem and neck stiffness.  "  Neurological: Positive for numbness. Negative for weakness.       Vitals:    06/29/22 1418   BP: 157/94   Pulse: 88   Resp: 16   SpO2: 97%   Weight: 88.5 kg (195 lb)   Height: 182.9 cm (72\")   PainSc:   6       Urine Drug Screen: 3/16/2022  Appropriate: Positive for marijuana    Objective   Physical Exam  Vitals and nursing note reviewed. Exam conducted with a chaperone present.   Constitutional:       General: He is not in acute distress.     Appearance: Normal appearance. He is normal weight.   Neurological:      General: No focal deficit present.      Mental Status: He is alert. Mental status is at baseline.      Sensory: No sensory deficit.      Motor: No weakness.           Assessment & Plan   Problems Addressed this Visit    None     Visit Diagnoses     Diabetic polyneuropathy associated with type 2 diabetes mellitus (HCC)    -  Primary    Relevant Medications    pregabalin (LYRICA) 150 MG capsule    traMADol (ULTRAM) 50 MG tablet (Start on 7/5/2022)    traMADol (ULTRAM) 50 MG tablet (Start on 8/4/2022)    Displacement of lumbar intervertebral disc without myelopathy        Displacement of cervical intervertebral disc without myelopathy          Diagnoses       Codes Comments    Diabetic polyneuropathy associated with type 2 diabetes mellitus (HCC)    -  Primary ICD-10-CM: E11.42  ICD-9-CM: 250.60, 357.2     Displacement of lumbar intervertebral disc without myelopathy     ICD-10-CM: M51.26  ICD-9-CM: 722.10     Displacement of cervical intervertebral disc without myelopathy     ICD-10-CM: M50.20  ICD-9-CM: 722.0           Plan:  1. Continue tramadol 50 mg 4 times daily  2. Continue gabapentin 300 mg 3 times daily  3. Inspect appropriate.  Will need repeat UDS.  --- Follow-up 2 months           INSPECT REPORT    As part of the patient's treatment plan, I may be prescribing controlled substances. The patient has been made aware of appropriate use of such medications, including potential risk of somnolence, " limited ability to drive and/or work safely, and the potential for dependence or overdose. It has also bee made clear that these medications are for use by this patient only, without concomitant use of alcohol or other substances unless prescribed.     Patient has completed prescribing agreement detailing terms of continued prescribing of controlled substances, including monitoring [facility] reports, urine drug screening, and pill counts if necessary. The patient is aware that inappropriate use will results in cessation of prescribing such medications.    INSPECT report has been reviewed and scanned into the patient's chart.    As the clinician, I personally reviewed the INSPECT from 6/28/2022.    History and physical exam exhibit continued safe and appropriate use of controlled substances.      EMR Dragon/Transcription disclaimer:   Much of this encounter note is an electronic transcription/translation of spoken language to printed text. The electronic translation of spoken language may permit erroneous, or at times, nonsensical words or phrases to be inadvertently transcribed; Although I have reviewed the note for such errors, some may still exist.       .

## 2022-07-29 ENCOUNTER — LAB (OUTPATIENT)
Dept: FAMILY MEDICINE CLINIC | Facility: CLINIC | Age: 47
End: 2022-07-29

## 2022-07-29 ENCOUNTER — OFFICE VISIT (OUTPATIENT)
Dept: FAMILY MEDICINE CLINIC | Facility: CLINIC | Age: 47
End: 2022-07-29

## 2022-07-29 VITALS
WEIGHT: 206 LBS | OXYGEN SATURATION: 97 % | HEART RATE: 85 BPM | TEMPERATURE: 98.2 F | SYSTOLIC BLOOD PRESSURE: 143 MMHG | BODY MASS INDEX: 27.94 KG/M2 | DIASTOLIC BLOOD PRESSURE: 96 MMHG

## 2022-07-29 DIAGNOSIS — E78.2 MIXED HYPERLIPIDEMIA: ICD-10-CM

## 2022-07-29 DIAGNOSIS — Z79.4 TYPE 2 DIABETES MELLITUS WITH OTHER SPECIFIED COMPLICATION, WITH LONG-TERM CURRENT USE OF INSULIN: ICD-10-CM

## 2022-07-29 DIAGNOSIS — J42 CHRONIC BRONCHITIS, UNSPECIFIED CHRONIC BRONCHITIS TYPE: ICD-10-CM

## 2022-07-29 DIAGNOSIS — E11.69 TYPE 2 DIABETES MELLITUS WITH OTHER SPECIFIED COMPLICATION, WITH LONG-TERM CURRENT USE OF INSULIN: ICD-10-CM

## 2022-07-29 DIAGNOSIS — I10 ESSENTIAL HYPERTENSION: Primary | ICD-10-CM

## 2022-07-29 LAB — HBA1C MFR BLD: 6.8 % (ref 3.5–5.6)

## 2022-07-29 PROCEDURE — 36415 COLL VENOUS BLD VENIPUNCTURE: CPT | Performed by: FAMILY MEDICINE

## 2022-07-29 PROCEDURE — 99214 OFFICE O/P EST MOD 30 MIN: CPT | Performed by: FAMILY MEDICINE

## 2022-07-29 PROCEDURE — 83036 HEMOGLOBIN GLYCOSYLATED A1C: CPT | Performed by: FAMILY MEDICINE

## 2022-07-29 RX ORDER — LOSARTAN POTASSIUM 100 MG/1
100 TABLET ORAL DAILY
Qty: 90 TABLET | Refills: 1 | Status: SHIPPED | OUTPATIENT
Start: 2022-07-29 | End: 2023-02-22 | Stop reason: SDUPTHER

## 2022-07-29 RX ORDER — BLOOD-GLUCOSE METER
KIT MISCELLANEOUS
Qty: 1 EACH | Refills: 0 | Status: SHIPPED | OUTPATIENT
Start: 2022-07-29

## 2022-07-29 RX ORDER — ROFLUMILAST 500 UG/1
500 TABLET ORAL DAILY
Qty: 90 TABLET | Refills: 1 | Status: ON HOLD | OUTPATIENT
Start: 2022-07-29 | End: 2023-02-07 | Stop reason: ALTCHOICE

## 2022-08-04 DIAGNOSIS — E11.69 TYPE 2 DIABETES MELLITUS WITH OTHER SPECIFIED COMPLICATION, WITH LONG-TERM CURRENT USE OF INSULIN: ICD-10-CM

## 2022-08-04 DIAGNOSIS — Z79.4 TYPE 2 DIABETES MELLITUS WITH OTHER SPECIFIED COMPLICATION, WITH LONG-TERM CURRENT USE OF INSULIN: ICD-10-CM

## 2022-08-04 RX ORDER — INSULIN DETEMIR 100 [IU]/ML
INJECTION, SOLUTION SUBCUTANEOUS
Qty: 18 ML | Refills: 1 | Status: SHIPPED | OUTPATIENT
Start: 2022-08-04 | End: 2022-08-08

## 2022-08-06 DIAGNOSIS — E11.69 TYPE 2 DIABETES MELLITUS WITH OTHER SPECIFIED COMPLICATION, WITH LONG-TERM CURRENT USE OF INSULIN: ICD-10-CM

## 2022-08-06 DIAGNOSIS — Z79.4 TYPE 2 DIABETES MELLITUS WITH OTHER SPECIFIED COMPLICATION, WITH LONG-TERM CURRENT USE OF INSULIN: ICD-10-CM

## 2022-08-08 RX ORDER — INSULIN DETEMIR 100 [IU]/ML
INJECTION, SOLUTION SUBCUTANEOUS
Qty: 18 ML | Refills: 1 | Status: SHIPPED | OUTPATIENT
Start: 2022-08-08 | End: 2023-02-22 | Stop reason: SDUPTHER

## 2022-08-13 DIAGNOSIS — J42 CHRONIC BRONCHITIS, UNSPECIFIED CHRONIC BRONCHITIS TYPE: ICD-10-CM

## 2022-08-15 DIAGNOSIS — E78.2 MIXED HYPERLIPIDEMIA: ICD-10-CM

## 2022-08-15 RX ORDER — BUDESONIDE, GLYCOPYRROLATE, AND FORMOTEROL FUMARATE 160; 9; 4.8 UG/1; UG/1; UG/1
2 AEROSOL, METERED RESPIRATORY (INHALATION) 2 TIMES DAILY
Qty: 10.7 G | Refills: 5 | Status: SHIPPED | OUTPATIENT
Start: 2022-08-15 | End: 2023-02-22 | Stop reason: SDUPTHER

## 2022-08-16 RX ORDER — ATORVASTATIN CALCIUM 40 MG/1
40 TABLET, FILM COATED ORAL NIGHTLY
Qty: 90 TABLET | Refills: 1 | Status: SHIPPED | OUTPATIENT
Start: 2022-08-16 | End: 2023-02-22 | Stop reason: SDUPTHER

## 2022-08-24 ENCOUNTER — TELEPHONE (OUTPATIENT)
Dept: PAIN MEDICINE | Facility: CLINIC | Age: 47
End: 2022-08-24

## 2022-08-24 DIAGNOSIS — E11.42 DIABETIC POLYNEUROPATHY ASSOCIATED WITH TYPE 2 DIABETES MELLITUS: ICD-10-CM

## 2022-08-24 RX ORDER — PREGABALIN 150 MG/1
150 CAPSULE ORAL 2 TIMES DAILY
Qty: 60 CAPSULE | Refills: 1 | Status: SHIPPED | OUTPATIENT
Start: 2022-08-24 | End: 2022-08-31 | Stop reason: SDUPTHER

## 2022-08-24 NOTE — TELEPHONE ENCOUNTER
Caller: Aj Plaza    Relationship: Self    Best call back number: [phone]    Requested Prescriptions:   LYRICA 150 MG (TAKE ONE TABLET EVERY 12 HOURS)      Pharmacy where request should be sent:    [facility] [id number] PH: [phone]    Additional details provided by patient:   HAS FOLLOW UP w/DR COYNE NEXT WED 08-31-22 @ 1450     Does the patient have less than a 3 day supply: [x] No   PATIENT HAS 4-5 DAYS LEFT BUT WILL RUN OUT MON 08-29-22 (PREVIOUSLY SENT 06-29-22)    PLEASE CALL / LEAVE VMAIL TO NOTIFY PATIENT WHEN LYRICA REFILL SENT TO [facility] # [id number] OR IF PATIENT NEEDS TO HAVE FOLLOW UP 08-31-22 APPT FIRST     THANKS

## 2022-08-31 ENCOUNTER — OFFICE VISIT (OUTPATIENT)
Dept: PAIN MEDICINE | Facility: CLINIC | Age: 47
End: 2022-08-31

## 2022-08-31 VITALS
DIASTOLIC BLOOD PRESSURE: 100 MMHG | RESPIRATION RATE: 16 BRPM | SYSTOLIC BLOOD PRESSURE: 158 MMHG | OXYGEN SATURATION: 97 % | HEART RATE: 80 BPM

## 2022-08-31 DIAGNOSIS — E11.42 DIABETIC POLYNEUROPATHY ASSOCIATED WITH TYPE 2 DIABETES MELLITUS: ICD-10-CM

## 2022-08-31 PROCEDURE — 99214 OFFICE O/P EST MOD 30 MIN: CPT | Performed by: STUDENT IN AN ORGANIZED HEALTH CARE EDUCATION/TRAINING PROGRAM

## 2022-08-31 RX ORDER — TRAMADOL HYDROCHLORIDE 50 MG/1
50 TABLET ORAL EVERY 6 HOURS PRN
Qty: 120 TABLET | Refills: 0 | Status: SHIPPED | OUTPATIENT
Start: 2022-10-31 | End: 2022-09-01 | Stop reason: SDUPTHER

## 2022-08-31 RX ORDER — TRAMADOL HYDROCHLORIDE 50 MG/1
50 TABLET ORAL EVERY 6 HOURS PRN
Qty: 120 TABLET | Refills: 0 | Status: SHIPPED | OUTPATIENT
Start: 2022-09-03 | End: 2022-09-01 | Stop reason: SDUPTHER

## 2022-08-31 RX ORDER — TRAMADOL HYDROCHLORIDE 50 MG/1
50 TABLET ORAL EVERY 6 HOURS PRN
Qty: 120 TABLET | Refills: 0 | Status: SHIPPED | OUTPATIENT
Start: 2022-10-01 | End: 2022-09-01 | Stop reason: SDUPTHER

## 2022-08-31 RX ORDER — PREGABALIN 200 MG/1
200 CAPSULE ORAL 2 TIMES DAILY
Qty: 60 CAPSULE | Refills: 2 | Status: SHIPPED | OUTPATIENT
Start: 2022-08-31 | End: 2022-09-01 | Stop reason: SDUPTHER

## 2022-08-31 NOTE — PROGRESS NOTES
Patient screened positive for depression based on a PHQ-9 score of 0 on 8/31/2022. Follow-up recommendations include: Elevated PHQ score reflective of acute illness, not depression     CHIEF COMPLAINT  Chief Complaint   Patient presents with   • Pain     2 month f/u for Diabetic polyneuropathy CC neck, mid back, low back, knees treated w/Tramadol 08/31 @ 1 pm        Primary Care  Nimo Rodas MD    Subjective   Aj Plaza is a 47 y.o. male  who presents for generalized pain.  He states that he has had pain in the neck, mid back, low back, knees for many years.  It appears that he was previously being treated at an outside pain clinic approximately 10 years ago with a combination of narcotics however he is no longer at the clinic for unknown reasons.  He states that most recently, he was smoking marijuana for pain control however he has subsequently developed lung issues and can no longer smoke marijuana.  He presents today to establish care.  He describes pain essentially all over his body.  He cannot identify any specific areas of pain which are of more concern than the others.    Back Pain  Associated symptoms include numbness. Pertinent negatives include no weakness.   Neck Pain   Associated symptoms include numbness. Pertinent negatives include no weakness.   Knee Pain   Associated symptoms include numbness.   Pain  Associated symptoms include arthralgias, myalgias, neck pain and numbness. Pertinent negatives include no weakness.        Location: Generalized pain  Onset: Many years ago  Duration: Progressively worsening  Timing: Constant throughout the day  Quality: Numbness and tingling in the hands with sharp, stabbing pains in the legs and feet  Severity: Today: 4       Last Week: 6       Worst: 8  Modifying Factors: The pain is fairly constant without any exacerbating or relieving factors    Physical Therapy: no    Interval Update 08/31/2022: He reports better benefit with the Lyrica and  tramadol.  Also getting good benefit from cervical epidural.  Also states he has gotten some delayed benefit from the lumbar epidural.    The following portions of the patient's history were reviewed and updated as appropriate: allergies, current medications, past family history, past medical history, past social history, past surgical history and problem list.      Current Outpatient Medications:   •  albuterol sulfate  (90 Base) MCG/ACT inhaler, , Disp: , Rfl:   •  atorvastatin (LIPITOR) 40 MG tablet, TAKE 1 TABLET BY MOUTH EVERY NIGHT, Disp: 90 tablet, Rfl: 1  •  B-D UF III MINI PEN NEEDLES 31G X 5 MM misc, , Disp: , Rfl:   •  Budeson-Glycopyrrol-Formoterol (Breztri Aerosphere) 160-9-4.8 MCG/ACT aerosol inhaler, Inhale 2 puffs 2 (Two) Times a Day. Rinse mouth after each use, Disp: 10.7 g, Rfl: 5  •  EPINEPHrine (EPIPEN) 0.3 MG/0.3ML solution auto-injector injection, , Disp: , Rfl:   •  glucose blood test strip, Use to monitor glucose twice daily. Dx: E11.69, Disp: 100 each, Rfl: 3  •  glucose monitor monitoring kit, Use to monitor glucose twice a day. Dx: E11.69, Disp: 1 each, Rfl: 0  •  Levemir FlexTouch 100 UNIT/ML injection, ADMINISTER 20 UNITS UNDER THE SKIN DAILY AS DIRECTED, Disp: 18 mL, Rfl: 1  •  losartan (COZAAR) 100 MG tablet, Take 1 tablet by mouth Daily., Disp: 90 tablet, Rfl: 1  •  pregabalin (LYRICA) 200 MG capsule, Take 1 capsule by mouth 2 (Two) Times a Day., Disp: 60 capsule, Rfl: 2  •  roflumilast (DALIRESP) 500 MCG tablet tablet, Take 1 tablet by mouth Daily., Disp: 90 tablet, Rfl: 1  •  SITagliptin-metFORMIN HCl ER (Janumet XR)  MG tablet, Take 2 tablets by mouth Daily., Disp: 180 tablet, Rfl: 1  •  [START ON 10/1/2022] traMADol (ULTRAM) 50 MG tablet, Take 1 tablet by mouth Every 6 (Six) Hours As Needed for Moderate Pain., Disp: 120 tablet, Rfl: 0  •  Vitamin D, Ergocalciferol, 50 MCG (2000 UT) capsule, Take 2,000 Units by mouth Daily., Disp: 100 capsule, Rfl: 4  •  [START ON  9/3/2022] traMADol (ULTRAM) 50 MG tablet, Take 1 tablet by mouth Every 6 (Six) Hours As Needed for Moderate Pain., Disp: 120 tablet, Rfl: 0  •  [START ON 10/31/2022] traMADol (ULTRAM) 50 MG tablet, Take 1 tablet by mouth Every 6 (Six) Hours As Needed for Moderate Pain., Disp: 120 tablet, Rfl: 0    Review of Systems   Musculoskeletal: Positive for arthralgias, back pain, myalgias and neck pain. Negative for gait problem and neck stiffness.   Neurological: Positive for numbness. Negative for weakness.       Vitals:    08/31/22 1444   BP: 158/100  Comment: started new b/p med 2 wks ago   Pulse: 80   Resp: 16   SpO2: 97%   PainSc:   4       Urine Drug Screen: 3/16/2022  Appropriate: Positive for marijuana    Objective   Physical Exam  Vitals and nursing note reviewed. Exam conducted with a chaperone present.   Constitutional:       General: He is not in acute distress.     Appearance: Normal appearance. He is normal weight.   Neurological:      General: No focal deficit present.      Mental Status: He is alert. Mental status is at baseline.      Sensory: No sensory deficit.      Motor: No weakness.           Assessment & Plan   Problems Addressed this Visit    None     Visit Diagnoses     Diabetic polyneuropathy associated with type 2 diabetes mellitus (HCC)        Relevant Medications    pregabalin (LYRICA) 200 MG capsule    traMADol (ULTRAM) 50 MG tablet (Start on 10/1/2022)    traMADol (ULTRAM) 50 MG tablet (Start on 9/3/2022)    traMADol (ULTRAM) 50 MG tablet (Start on 10/31/2022)      Diagnoses       Codes Comments    Diabetic polyneuropathy associated with type 2 diabetes mellitus (HCC)     ICD-10-CM: E11.42  ICD-9-CM: 250.60, 357.2           Plan:  1. Continue tramadol 50 mg 4 times daily  2. Increase Lyrica to 200 mg twice daily  3. Inspect appropriate.  Will need repeat UDS.  --- Follow-up 3 months           INSPECT REPORT    As part of the patient's treatment plan, I may be prescribing controlled substances.  The patient has been made aware of appropriate use of such medications, including potential risk of somnolence, limited ability to drive and/or work safely, and the potential for dependence or overdose. It has also bee made clear that these medications are for use by this patient only, without concomitant use of alcohol or other substances unless prescribed.     Patient has completed prescribing agreement detailing terms of continued prescribing of controlled substances, including monitoring JAI reports, urine drug screening, and pill counts if necessary. The patient is aware that inappropriate use will results in cessation of prescribing such medications.    INSPECT report has been reviewed and scanned into the patient's chart.    As the clinician, I personally reviewed the INSPECT from 8/30/2022.    History and physical exam exhibit continued safe and appropriate use of controlled substances.      EMR Dragon/Transcription disclaimer:   Much of this encounter note is an electronic transcription/translation of spoken language to printed text. The electronic translation of spoken language may permit erroneous, or at times, nonsensical words or phrases to be inadvertently transcribed; Although I have reviewed the note for such errors, some may still exist.       .

## 2022-09-01 ENCOUNTER — TELEPHONE (OUTPATIENT)
Dept: PAIN MEDICINE | Facility: CLINIC | Age: 47
End: 2022-09-01

## 2022-09-01 DIAGNOSIS — E11.42 DIABETIC POLYNEUROPATHY ASSOCIATED WITH TYPE 2 DIABETES MELLITUS: ICD-10-CM

## 2022-09-01 RX ORDER — TRAMADOL HYDROCHLORIDE 50 MG/1
50 TABLET ORAL EVERY 6 HOURS PRN
Qty: 120 TABLET | Refills: 0 | Status: SHIPPED | OUTPATIENT
Start: 2022-09-03 | End: 2022-11-30 | Stop reason: SDUPTHER

## 2022-09-01 RX ORDER — TRAMADOL HYDROCHLORIDE 50 MG/1
50 TABLET ORAL EVERY 6 HOURS PRN
Qty: 120 TABLET | Refills: 0 | Status: SHIPPED | OUTPATIENT
Start: 2022-10-31 | End: 2022-11-30 | Stop reason: SDUPTHER

## 2022-09-01 RX ORDER — TRAMADOL HYDROCHLORIDE 50 MG/1
50 TABLET ORAL EVERY 6 HOURS PRN
Qty: 120 TABLET | Refills: 0 | Status: SHIPPED | OUTPATIENT
Start: 2022-10-01 | End: 2022-11-30 | Stop reason: SDUPTHER

## 2022-09-01 RX ORDER — PREGABALIN 200 MG/1
200 CAPSULE ORAL 2 TIMES DAILY
Qty: 60 CAPSULE | Refills: 2 | Status: SHIPPED | OUTPATIENT
Start: 2022-09-01 | End: 2022-11-30 | Stop reason: SDUPTHER

## 2022-09-01 NOTE — TELEPHONE ENCOUNTER
Provider: DR COYNE    Caller: LEEANNA    Relationship to Patient: [facility]    Phone Number: [phone]    Reason for Call: LEEANNA SAID THAT THE PATIENT'S TRAMADOL AND PREGABALIN RXs WERE ACCIDENTALLY DELETED FROM THE PATIENT'S PROFILE BY THE PHARMACY MANAGER AND THEY NEED TO HAVE THOSE SENT BACK OVER SO THAT THEY CAN FILL IT THIS WEEKEND FOR THE PATIENT. SHE SAID THAT IF THE OFFICE NEEDS TO CALL AND DISCUSS SHE IS THERE UNTIL 6:30, AND IS WILLING TO SPEAK ABOUT THIS ISSUE. SHE WANTED TO MAKE SURE THE OFFICE KNOWS THAT THIS IS NOTHING THAT HAPPENED ON THE PATIENT'S PART.

## 2022-09-13 DIAGNOSIS — E11.69 TYPE 2 DIABETES MELLITUS WITH OTHER SPECIFIED COMPLICATION, WITH LONG-TERM CURRENT USE OF INSULIN: Primary | ICD-10-CM

## 2022-09-13 DIAGNOSIS — Z79.4 TYPE 2 DIABETES MELLITUS WITH OTHER SPECIFIED COMPLICATION, WITH LONG-TERM CURRENT USE OF INSULIN: Primary | ICD-10-CM

## 2022-09-19 RX ORDER — LANCETS
1 EACH MISCELLANEOUS 2 TIMES DAILY
Qty: 100 EACH | Refills: 5 | Status: SHIPPED | OUTPATIENT
Start: 2022-09-19 | End: 2023-02-22 | Stop reason: SDUPTHER

## 2022-11-30 ENCOUNTER — OFFICE VISIT (OUTPATIENT)
Dept: PAIN MEDICINE | Facility: CLINIC | Age: 47
End: 2022-11-30

## 2022-11-30 VITALS
RESPIRATION RATE: 16 BRPM | HEART RATE: 96 BPM | SYSTOLIC BLOOD PRESSURE: 156 MMHG | DIASTOLIC BLOOD PRESSURE: 96 MMHG | OXYGEN SATURATION: 97 %

## 2022-11-30 DIAGNOSIS — E11.42 DIABETIC POLYNEUROPATHY ASSOCIATED WITH TYPE 2 DIABETES MELLITUS: ICD-10-CM

## 2022-11-30 DIAGNOSIS — G58.8 PUDENDAL NEURALGIA: Primary | ICD-10-CM

## 2022-11-30 PROCEDURE — 99214 OFFICE O/P EST MOD 30 MIN: CPT | Performed by: STUDENT IN AN ORGANIZED HEALTH CARE EDUCATION/TRAINING PROGRAM

## 2022-11-30 RX ORDER — PREGABALIN 200 MG/1
200 CAPSULE ORAL 2 TIMES DAILY
Qty: 60 CAPSULE | Refills: 2 | Status: SHIPPED | OUTPATIENT
Start: 2022-11-30 | End: 2023-02-22 | Stop reason: SDUPTHER

## 2022-11-30 RX ORDER — TRAMADOL HYDROCHLORIDE 50 MG/1
50 TABLET ORAL EVERY 6 HOURS PRN
Qty: 120 TABLET | Refills: 0 | Status: SHIPPED | OUTPATIENT
Start: 2023-01-28 | End: 2023-02-22 | Stop reason: SDUPTHER

## 2022-11-30 RX ORDER — TRAMADOL HYDROCHLORIDE 50 MG/1
50 TABLET ORAL EVERY 6 HOURS PRN
Qty: 120 TABLET | Refills: 0 | Status: ON HOLD | OUTPATIENT
Start: 2022-11-30 | End: 2023-02-06 | Stop reason: SDUPTHER

## 2022-11-30 RX ORDER — TRAMADOL HYDROCHLORIDE 50 MG/1
50 TABLET ORAL EVERY 6 HOURS PRN
Qty: 120 TABLET | Refills: 0 | Status: ON HOLD | OUTPATIENT
Start: 2022-12-29 | End: 2023-02-06 | Stop reason: SDUPTHER

## 2022-11-30 NOTE — PROGRESS NOTES
Patient screened positive for depression based on a PHQ-9 score of 0 on 11/30/2022. Follow-up recommendations include: Elevated PHQ score reflective of acute illness, not depression     CHIEF COMPLAINT  Chief Complaint   Patient presents with   • Back Pain     3 month f/u  CC neck, mid back, low back, knees treated w/Tramadol 11/30 @ 2:45 pm          Primary Care  Nimo Rodas MD    Subjective   Aj Plaza is a 47 y.o. male  who presents for generalized pain.  He states that he has had pain in the neck, mid back, low back, knees for many years.  It appears that he was previously being treated at an outside pain clinic approximately 10 years ago with a combination of narcotics however he is no longer at the clinic for unknown reasons.  He states that most recently, he was smoking marijuana for pain control however he has subsequently developed lung issues and can no longer smoke marijuana.  He presents today to establish care.  He describes pain essentially all over his body.  He cannot identify any specific areas of pain which are of more concern than the others.    Pain  Associated symptoms include arthralgias, myalgias, neck pain and numbness. Pertinent negatives include no weakness.   Back Pain  Associated symptoms include numbness. Pertinent negatives include no weakness.   Neck Pain   Associated symptoms include numbness. Pertinent negatives include no weakness.   Knee Pain   Associated symptoms include numbness.        Location: Generalized pain  Onset: Many years ago  Duration: Progressively worsening  Timing: Constant throughout the day  Quality: Numbness and tingling in the hands with sharp, stabbing pains in the legs and feet  Severity: Today: 6       Last Week: 6       Worst: 8  Modifying Factors: The pain is fairly constant without any exacerbating or relieving factors    Physical Therapy: no    Interval Update 11/30/2022: He reports that his neck is better.  Continues to do well with  combination of tramadol and Lyrica.  Is complaining of some pain in the genital region suggestive of pudendal neuralgia    The following portions of the patient's history were reviewed and updated as appropriate: allergies, current medications, past family history, past medical history, past social history, past surgical history and problem list.      Current Outpatient Medications:   •  Accu-Chek Softclix Lancets lancets, 1 each by Other route 2 (Two) Times a Day. Use to monitor glucose twice daily., Disp: 100 each, Rfl: 5  •  albuterol sulfate  (90 Base) MCG/ACT inhaler, , Disp: , Rfl:   •  atorvastatin (LIPITOR) 40 MG tablet, TAKE 1 TABLET BY MOUTH EVERY NIGHT, Disp: 90 tablet, Rfl: 1  •  B-D UF III MINI PEN NEEDLES 31G X 5 MM misc, , Disp: , Rfl:   •  Budeson-Glycopyrrol-Formoterol (Breztri Aerosphere) 160-9-4.8 MCG/ACT aerosol inhaler, Inhale 2 puffs 2 (Two) Times a Day. Rinse mouth after each use, Disp: 10.7 g, Rfl: 5  •  EPINEPHrine (EPIPEN) 0.3 MG/0.3ML solution auto-injector injection, , Disp: , Rfl:   •  glucose blood test strip, Use to monitor glucose twice daily. Dx: E11.69, Disp: 100 each, Rfl: 3  •  glucose monitor monitoring kit, Use to monitor glucose twice a day. Dx: E11.69, Disp: 1 each, Rfl: 0  •  Levemir FlexTouch 100 UNIT/ML injection, ADMINISTER 20 UNITS UNDER THE SKIN DAILY AS DIRECTED, Disp: 18 mL, Rfl: 1  •  losartan (COZAAR) 100 MG tablet, Take 1 tablet by mouth Daily., Disp: 90 tablet, Rfl: 1  •  pregabalin (LYRICA) 200 MG capsule, Take 1 capsule by mouth 2 (Two) Times a Day., Disp: 60 capsule, Rfl: 2  •  roflumilast (DALIRESP) 500 MCG tablet tablet, Take 1 tablet by mouth Daily., Disp: 90 tablet, Rfl: 1  •  SITagliptin-metFORMIN HCl ER (Janumet XR)  MG tablet, Take 2 tablets by mouth Daily., Disp: 180 tablet, Rfl: 1  •  [START ON 1/28/2023] traMADol (ULTRAM) 50 MG tablet, Take 1 tablet by mouth Every 6 (Six) Hours As Needed for Moderate Pain., Disp: 120 tablet, Rfl: 0  •   [START ON 12/29/2022] traMADol (ULTRAM) 50 MG tablet, Take 1 tablet by mouth Every 6 (Six) Hours As Needed for Moderate Pain., Disp: 120 tablet, Rfl: 0  •  traMADol (ULTRAM) 50 MG tablet, Take 1 tablet by mouth Every 6 (Six) Hours As Needed for Moderate Pain., Disp: 120 tablet, Rfl: 0  •  Vitamin D, Ergocalciferol, 50 MCG (2000 UT) capsule, Take 2,000 Units by mouth Daily., Disp: 100 capsule, Rfl: 4    Review of Systems   Musculoskeletal: Positive for arthralgias, back pain, myalgias and neck pain. Negative for gait problem and neck stiffness.   Neurological: Positive for numbness. Negative for weakness.       Vitals:    11/30/22 1523   BP: 156/96   Pulse: 96   Resp: 16   SpO2: 97%   PainSc:   6       Urine Drug Screen: 3/16/2022  Appropriate: Positive for marijuana    Objective   Physical Exam  Vitals and nursing note reviewed. Exam conducted with a chaperone present.   Constitutional:       General: He is not in acute distress.     Appearance: Normal appearance. He is normal weight.   Neurological:      General: No focal deficit present.      Mental Status: He is alert. Mental status is at baseline.      Sensory: No sensory deficit.      Motor: No weakness.           Assessment & Plan   Problems Addressed this Visit    None  Visit Diagnoses     Pudendal neuralgia    -  Primary    Relevant Orders    Epidural Block    Diabetic polyneuropathy associated with type 2 diabetes mellitus (HCC)        Relevant Medications    pregabalin (LYRICA) 200 MG capsule    traMADol (ULTRAM) 50 MG tablet (Start on 1/28/2023)    traMADol (ULTRAM) 50 MG tablet (Start on 12/29/2022)    traMADol (ULTRAM) 50 MG tablet      Diagnoses       Codes Comments    Pudendal neuralgia    -  Primary ICD-10-CM: G58.8  ICD-9-CM: 355.8     Diabetic polyneuropathy associated with type 2 diabetes mellitus (HCC)     ICD-10-CM: E11.42  ICD-9-CM: 250.60, 357.2           Plan:  1. Continue tramadol 50 mg 4 times daily  2. Increase Lyrica to 200 mg twice  daily  3. Inspect appropriate.  Did have trace THC on UDS  4. Plan for caudal epidural for pudendal neuralgia  --- Follow-up next available for caudal epidural           INSPECT REPORT    As part of the patient's treatment plan, I may be prescribing controlled substances. The patient has been made aware of appropriate use of such medications, including potential risk of somnolence, limited ability to drive and/or work safely, and the potential for dependence or overdose. It has also bee made clear that these medications are for use by this patient only, without concomitant use of alcohol or other substances unless prescribed.     Patient has completed prescribing agreement detailing terms of continued prescribing of controlled substances, including monitoring JAI reports, urine drug screening, and pill counts if necessary. The patient is aware that inappropriate use will results in cessation of prescribing such medications.    INSPECT report has been reviewed and scanned into the patient's chart.    As the clinician, I personally reviewed the INSPECT from 11/29/2022.    History and physical exam exhibit continued safe and appropriate use of controlled substances.      EMR Dragon/Transcription disclaimer:   Much of this encounter note is an electronic transcription/translation of spoken language to printed text. The electronic translation of spoken language may permit erroneous, or at times, nonsensical words or phrases to be inadvertently transcribed; Although I have reviewed the note for such errors, some may still exist.       .

## 2022-12-14 ENCOUNTER — TELEPHONE (OUTPATIENT)
Dept: FAMILY MEDICINE CLINIC | Facility: CLINIC | Age: 47
End: 2022-12-14

## 2022-12-14 NOTE — TELEPHONE ENCOUNTER
UNABLE TO WARM TRANSFER, PRACTICE MANAGER    Caller: Viri Plaza    Relationship: Self    Best call back number: [phone]    What is the best time to reach you: ANYTIME     Who are you requesting to speak with (clinical staff, provider,  specific staff member): CLINICAL     Do you know the name of the person who called:LANEY    What was the call regarding:  VIRI WOULD LIKE A CALL BACK REGARDING HIS MEDICATION BEING, DENIED. HE SAYS HE WAS NOT TOLD AT CHECKOUT DURING LAST APPOINTMENT THAT HE WAS NEEDING TO GET RE ESTABLISHED WITH NEW PROVIDER BECAUSE DR. GONZALEZ WAS LEAVING. SOONEST AVAILABLE 3/2023. HE WILL BE CONTACTING CORPORATE     PHARMACY HAS DENIED JANUMET   Do you require a callback: YES

## 2023-01-06 ENCOUNTER — TELEPHONE (OUTPATIENT)
Dept: PAIN MEDICINE | Facility: CLINIC | Age: 48
End: 2023-01-06
Payer: MEDICARE

## 2023-01-06 NOTE — TELEPHONE ENCOUNTER
Caller: VIRI HARO     Relationship to patient: PATIENT     Best call back number: [phone]    Type of visit: CAUDAL EPIDURAL     Requested date:  MID January     If rescheduling, when is the original appointment: 01/09/23    Additional notes:PREV HAD COVID AND NOW HAS SYMPTOMS OF WHAT HE THINKS TO BE COVID AGAIN

## 2023-01-09 ENCOUNTER — APPOINTMENT (OUTPATIENT)
Dept: PAIN MEDICINE | Facility: HOSPITAL | Age: 48
End: 2023-01-09
Payer: MEDICARE

## 2023-01-16 ENCOUNTER — HOSPITAL ENCOUNTER (OUTPATIENT)
Dept: PAIN MEDICINE | Facility: HOSPITAL | Age: 48
Discharge: HOME | End: 2023-01-16
Payer: MEDICARE

## 2023-01-16 VITALS
RESPIRATION RATE: 16 BRPM | OXYGEN SATURATION: 97 % | TEMPERATURE: 96.9 F | BODY MASS INDEX: 29.12 KG/M2 | HEART RATE: 60 BPM | HEIGHT: 72 IN | WEIGHT: 215 LBS | DIASTOLIC BLOOD PRESSURE: 106 MMHG | SYSTOLIC BLOOD PRESSURE: 153 MMHG

## 2023-01-16 DIAGNOSIS — G58.8 PUDENDAL NEURALGIA: ICD-10-CM

## 2023-01-16 DIAGNOSIS — R52 PAIN: ICD-10-CM

## 2023-01-16 PROCEDURE — 0 IOPAMIDOL 41 % SOLUTION: Performed by: STUDENT IN AN ORGANIZED HEALTH CARE EDUCATION/TRAINING PROGRAM

## 2023-01-16 PROCEDURE — 62323 NJX INTERLAMINAR LMBR/SAC: CPT | Performed by: STUDENT IN AN ORGANIZED HEALTH CARE EDUCATION/TRAINING PROGRAM

## 2023-01-16 PROCEDURE — 77003 FLUOROGUIDE FOR SPINE INJECT: CPT

## 2023-01-16 PROCEDURE — 25010000002 METHYLPREDNISOLONE PER 40 MG: Performed by: STUDENT IN AN ORGANIZED HEALTH CARE EDUCATION/TRAINING PROGRAM

## 2023-01-16 RX ORDER — BUPIVACAINE HYDROCHLORIDE 2.5 MG/ML
10 INJECTION, SOLUTION EPIDURAL; INFILTRATION; INTRACAUDAL ONCE
Status: COMPLETED | OUTPATIENT
Start: 2023-01-16 | End: 2023-01-16

## 2023-01-16 RX ORDER — METHYLPREDNISOLONE ACETATE 40 MG/ML
40 INJECTION, SUSPENSION INTRA-ARTICULAR; INTRALESIONAL; INTRAMUSCULAR; SOFT TISSUE ONCE
Status: COMPLETED | OUTPATIENT
Start: 2023-01-16 | End: 2023-01-16

## 2023-01-16 RX ADMIN — BUPIVACAINE HYDROCHLORIDE 5 ML: 2.5 INJECTION, SOLUTION EPIDURAL; INFILTRATION; INTRACAUDAL; PERINEURAL at 15:09

## 2023-01-16 RX ADMIN — METHYLPREDNISOLONE ACETATE 40 MG: 40 INJECTION, SUSPENSION INTRA-ARTICULAR; INTRALESIONAL; INTRAMUSCULAR; INTRASYNOVIAL; SOFT TISSUE at 15:10

## 2023-01-16 RX ADMIN — IOPAMIDOL 3 ML: 408 INJECTION, SOLUTION INTRATHECAL at 15:09

## 2023-01-16 NOTE — DISCHARGE INSTRUCTIONS

## 2023-01-16 NOTE — PROCEDURES
Caudal Epidural:  [facility]    PREOPERATIVE DIAGNOSIS: Chronic low back pain, Lumbar Degenerative Disc Disease, Lumbar Disc Displacement and Lumbar Radiculopathy    POSTOPERATIVE DIAGNOSIS: Same as preop diagnosis    PROCEDURE:    • Caudal Epidural Steroid Injection, Therapeutic, with fluoroscopic guidance    PRE-PROCEDURE DISCUSSION WITH PATIENT:    Risks and complications were discussed with the patient prior to starting the procedure and informed consent was obtained.  We discussed various topics including but not limited to bleeding & infection & injury & paralysis & coma & death & worsening of clinical picture & postprocedural soreness & lack of pain relief.    SURGEON:  Sudhir Mathis MD    REASON FOR PROCEDURE: Degenerative changes are noted in the area., Stenotic area is noted, and is likely contributing to this chronic &/or recurrent pain.  and Radiating pattern of pain is likely consistent with degenerative changes in the area.    SEDATION:   Patient declined administration of moderate sedation    ANESTHETIC:  Marcaine 0.25%  STEROID:     Methylprednisolone (DEPO MEDROL) 40mg/ml  TOTAL VOLUME OF INJECTATE: 7 mL    DESCRIPTON OF PROCEDURE:  After obtaining informed consent, the patient taken to the operating room and was placed in the prone position.  An IV  was not  started in the preoperative area.  All pressure points were well padded.  All sedation that was administered was given by the RN under my direct supervision and guidance.      The lumbosacral area was prepped with Chloraprep and draped in a sterile fashion with a sterile drape.  The sacral hiatus was identified by palpation of the cornu on either side.  Under fluoroscopic guidance, the sacral hiatus was identified on lateral imaging. The overlying skin and subcutaneous tissue was then anesthetized with 1% lidocaine.  A 22-gauge needle was introduced under fluoroscopic guidance through the sacral hiatus into the epidural space.   Aspiration was negative for blood or CSF.  After confirming the position of the needle with PA and lateral fluoroscopy, 2 mL of Omnipaque was injected.  After viewing appropriate epidurogram, with dye spread up to the S1 level,  with lateral and PA views, and confirming a lack of vascular uptake and lack of evidence of intrathecal injection, the injectate noted above was injected slowly.    The needle was removed intact.  Vital signs were stable throughout.        ESTIMATED BLOOD LOSS:  minimal  SPECIMENS:  none    COMPLICATIONS:   No complications were noted., There was no indication of vascular uptake on live injection of contrast dye., There was no indication of intrathecal uptake on live injection of contrast dye. and There was not any evidence of dural puncture.      TOLERANCE & DISCHARGE CONDITION:    The patient tolerated the procedure well.  The patient was transported to the recovery area without difficulties.  The patient was discharged to home under the care of family in stable and satisfactory condition.    PLAN OF CARE:  1. The patient was given our standard instruction sheet.  2. The patient will Return to clinic 6 wks  3. The patient will resume all medications as per the medication reconciliation sheet.

## 2023-01-17 ENCOUNTER — TELEPHONE (OUTPATIENT)
Dept: PAIN MEDICINE | Facility: HOSPITAL | Age: 48
End: 2023-01-17
Payer: MEDICARE

## 2023-01-30 DIAGNOSIS — E11.69 TYPE 2 DIABETES MELLITUS WITH OTHER SPECIFIED COMPLICATION, WITH LONG-TERM CURRENT USE OF INSULIN: ICD-10-CM

## 2023-01-30 DIAGNOSIS — Z79.4 TYPE 2 DIABETES MELLITUS WITH OTHER SPECIFIED COMPLICATION, WITH LONG-TERM CURRENT USE OF INSULIN: ICD-10-CM

## 2023-01-30 RX ORDER — INSULIN DETEMIR 100 [IU]/ML
INJECTION, SOLUTION SUBCUTANEOUS
Qty: 18 ML | Refills: 1 | OUTPATIENT
Start: 2023-01-30

## 2023-01-30 NOTE — TELEPHONE ENCOUNTER
Caller: [id number] - [place], [place] - [address] AT [address] - [phone]  - [phone] FX    Relationship: Pharmacy    Best call back number: [phone]    Requested Prescriptions:   Requested Prescriptions     Pending Prescriptions Disp Refills   • insulin detemir (Levemir FlexTouch) 100 UNIT/ML injection 18 mL 1        Pharmacy where request should be sent: [id number] - [place], [place] - [address]  AT [address] & [address] - [phone]  - [phone] FX     Additional details provided by patient: PHARMACY IS CALLING ON BEHALF OF PATIENT TO REQUEST A REFILL, PATIENT IS OUT OF MEDICATION.    Does the patient have less than a 3 day supply:  [x] Yes  [] No    Would you like a call back once the refill request has been completed: [] Yes [x] No    If the office needs to give you a call back, can they leave a voicemail: [] Yes [x] No    Wily Garsia Rep   01/30/23 12:19 EST

## 2023-02-06 ENCOUNTER — HOSPITAL ENCOUNTER (OUTPATIENT)
Facility: HOSPITAL | Age: 48
Setting detail: OBSERVATION
Discharge: HOME | End: 2023-02-07
Attending: EMERGENCY MEDICINE | Admitting: EMERGENCY MEDICINE
Payer: MEDICARE

## 2023-02-06 ENCOUNTER — APPOINTMENT (OUTPATIENT)
Dept: CT IMAGING | Facility: HOSPITAL | Age: 48
End: 2023-02-06
Payer: MEDICARE

## 2023-02-06 DIAGNOSIS — R10.10 PAIN OF UPPER ABDOMEN: ICD-10-CM

## 2023-02-06 DIAGNOSIS — R11.2 NAUSEA AND VOMITING, UNSPECIFIED VOMITING TYPE: Primary | ICD-10-CM

## 2023-02-06 PROBLEM — R11.10 VOMITING: Status: ACTIVE | Noted: 2023-02-06

## 2023-02-06 LAB
ALBUMIN SERPL-MCNC: 4.8 G/DL (ref 3.5–5.2)
ALBUMIN/GLOB SERPL: 2 G/DL
ALP SERPL-CCNC: 97 U/L (ref 39–117)
ALT SERPL W P-5'-P-CCNC: 23 U/L (ref 1–41)
ANION GAP SERPL CALCULATED.3IONS-SCNC: 11 MMOL/L (ref 5–15)
AST SERPL-CCNC: 17 U/L (ref 1–40)
BACTERIA UR QL AUTO: ABNORMAL /HPF
BASOPHILS # BLD AUTO: 0.1 10*3/MM3 (ref 0–0.2)
BASOPHILS NFR BLD AUTO: 1.2 % (ref 0–1.5)
BILIRUB SERPL-MCNC: 0.5 MG/DL (ref 0–1.2)
BILIRUB UR QL STRIP: NEGATIVE
BUN SERPL-MCNC: 17 MG/DL (ref 6–20)
BUN/CREAT SERPL: 21.3 (ref 7–25)
CALCIUM SPEC-SCNC: 10.1 MG/DL (ref 8.6–10.5)
CHLORIDE SERPL-SCNC: 105 MMOL/L (ref 98–107)
CLARITY UR: CLEAR
CO2 SERPL-SCNC: 24 MMOL/L (ref 22–29)
COLOR UR: YELLOW
CREAT SERPL-MCNC: 0.8 MG/DL (ref 0.76–1.27)
DEPRECATED RDW RBC AUTO: 40.7 FL (ref 37–54)
EGFRCR SERPLBLD CKD-EPI 2021: 109.2 ML/MIN/1.73
EOSINOPHIL # BLD AUTO: 0.2 10*3/MM3 (ref 0–0.4)
EOSINOPHIL NFR BLD AUTO: 2.7 % (ref 0.3–6.2)
ERYTHROCYTE [DISTWIDTH] IN BLOOD BY AUTOMATED COUNT: 13.3 % (ref 12.3–15.4)
FLUAV SUBTYP SPEC NAA+PROBE: NOT DETECTED
FLUBV RNA ISLT QL NAA+PROBE: NOT DETECTED
GLOBULIN UR ELPH-MCNC: 2.4 GM/DL
GLUCOSE BLDC GLUCOMTR-MCNC: 251 MG/DL (ref 70–105)
GLUCOSE SERPL-MCNC: 276 MG/DL (ref 65–99)
GLUCOSE UR STRIP-MCNC: ABNORMAL MG/DL
HCT VFR BLD AUTO: 44.4 % (ref 37.5–51)
HGB BLD-MCNC: 15.6 G/DL (ref 13–17.7)
HGB UR QL STRIP.AUTO: NEGATIVE
HYALINE CASTS UR QL AUTO: ABNORMAL /LPF
KETONES UR QL STRIP: ABNORMAL
LEUKOCYTE ESTERASE UR QL STRIP.AUTO: NEGATIVE
LIPASE SERPL-CCNC: 110 U/L (ref 13–60)
LYMPHOCYTES # BLD AUTO: 1.7 10*3/MM3 (ref 0.7–3.1)
LYMPHOCYTES NFR BLD AUTO: 20.3 % (ref 19.6–45.3)
MCH RBC QN AUTO: 31.1 PG (ref 26.6–33)
MCHC RBC AUTO-ENTMCNC: 35.1 G/DL (ref 31.5–35.7)
MCV RBC AUTO: 88.6 FL (ref 79–97)
MONOCYTES # BLD AUTO: 0.5 10*3/MM3 (ref 0.1–0.9)
MONOCYTES NFR BLD AUTO: 6.4 % (ref 5–12)
NEUTROPHILS NFR BLD AUTO: 5.8 10*3/MM3 (ref 1.7–7)
NEUTROPHILS NFR BLD AUTO: 69.4 % (ref 42.7–76)
NITRITE UR QL STRIP: NEGATIVE
NRBC BLD AUTO-RTO: 0 /100 WBC (ref 0–0.2)
PH UR STRIP.AUTO: 8 [PH] (ref 5–8)
PLATELET # BLD AUTO: 168 10*3/MM3 (ref 140–450)
PMV BLD AUTO: 8.2 FL (ref 6–12)
POTASSIUM SERPL-SCNC: 5 MMOL/L (ref 3.5–5.2)
PROT SERPL-MCNC: 7.2 G/DL (ref 6–8.5)
PROT UR QL STRIP: ABNORMAL
RBC # BLD AUTO: 5.01 10*6/MM3 (ref 4.14–5.8)
RBC # UR STRIP: ABNORMAL /HPF
REF LAB TEST METHOD: ABNORMAL
SARS-COV-2 RNA PNL SPEC NAA+PROBE: NOT DETECTED
SODIUM SERPL-SCNC: 140 MMOL/L (ref 136–145)
SP GR UR STRIP: 1.03 (ref 1–1.03)
SQUAMOUS #/AREA URNS HPF: ABNORMAL /HPF
UROBILINOGEN UR QL STRIP: ABNORMAL
WBC # UR STRIP: ABNORMAL /HPF
WBC NRBC COR # BLD: 8.3 10*3/MM3 (ref 3.4–10.8)

## 2023-02-06 PROCEDURE — G0378 HOSPITAL OBSERVATION PER HR: HCPCS

## 2023-02-06 PROCEDURE — 85025 COMPLETE CBC W/AUTO DIFF WBC: CPT

## 2023-02-06 PROCEDURE — 0 IOPAMIDOL PER 1 ML: Performed by: EMERGENCY MEDICINE

## 2023-02-06 PROCEDURE — 80053 COMPREHEN METABOLIC PANEL: CPT

## 2023-02-06 PROCEDURE — 83690 ASSAY OF LIPASE: CPT

## 2023-02-06 PROCEDURE — 96376 TX/PRO/DX INJ SAME DRUG ADON: CPT

## 2023-02-06 PROCEDURE — 25010000002 HYDROMORPHONE 1 MG/ML SOLUTION: Performed by: NURSE PRACTITIONER

## 2023-02-06 PROCEDURE — 99284 EMERGENCY DEPT VISIT MOD MDM: CPT

## 2023-02-06 PROCEDURE — 25010000002 METOCLOPRAMIDE PER 10 MG: Performed by: NURSE PRACTITIONER

## 2023-02-06 PROCEDURE — 82962 GLUCOSE BLOOD TEST: CPT

## 2023-02-06 PROCEDURE — 74177 CT ABD & PELVIS W/CONTRAST: CPT

## 2023-02-06 PROCEDURE — 25010000002 ONDANSETRON PER 1 MG

## 2023-02-06 PROCEDURE — 96374 THER/PROPH/DIAG INJ IV PUSH: CPT

## 2023-02-06 PROCEDURE — 81001 URINALYSIS AUTO W/SCOPE: CPT

## 2023-02-06 PROCEDURE — C9803 HOPD COVID-19 SPEC COLLECT: HCPCS

## 2023-02-06 PROCEDURE — 87636 SARSCOV2 & INF A&B AMP PRB: CPT | Performed by: EMERGENCY MEDICINE

## 2023-02-06 PROCEDURE — 96375 TX/PRO/DX INJ NEW DRUG ADDON: CPT

## 2023-02-06 RX ORDER — ACETAMINOPHEN 325 MG/1
650 TABLET ORAL EVERY 4 HOURS PRN
Status: DISCONTINUED | OUTPATIENT
Start: 2023-02-06 | End: 2023-02-07 | Stop reason: HOSPADM

## 2023-02-06 RX ORDER — CHOLECALCIFEROL (VITAMIN D3) 125 MCG
5 CAPSULE ORAL NIGHTLY PRN
Status: DISCONTINUED | OUTPATIENT
Start: 2023-02-06 | End: 2023-02-07 | Stop reason: HOSPADM

## 2023-02-06 RX ORDER — TRAMADOL HYDROCHLORIDE 50 MG/1
50 TABLET ORAL EVERY 6 HOURS PRN
Status: DISCONTINUED | OUTPATIENT
Start: 2023-02-06 | End: 2023-02-07 | Stop reason: HOSPADM

## 2023-02-06 RX ORDER — NICOTINE POLACRILEX 4 MG
15 LOZENGE BUCCAL
Status: DISCONTINUED | OUTPATIENT
Start: 2023-02-06 | End: 2023-02-07 | Stop reason: HOSPADM

## 2023-02-06 RX ORDER — ATORVASTATIN CALCIUM 40 MG/1
40 TABLET, FILM COATED ORAL NIGHTLY
Status: DISCONTINUED | OUTPATIENT
Start: 2023-02-06 | End: 2023-02-06

## 2023-02-06 RX ORDER — SODIUM CHLORIDE 9 MG/ML
40 INJECTION, SOLUTION INTRAVENOUS AS NEEDED
Status: DISCONTINUED | OUTPATIENT
Start: 2023-02-06 | End: 2023-02-07

## 2023-02-06 RX ORDER — ONDANSETRON 2 MG/ML
8 INJECTION INTRAMUSCULAR; INTRAVENOUS ONCE
Status: COMPLETED | OUTPATIENT
Start: 2023-02-06 | End: 2023-02-06

## 2023-02-06 RX ORDER — CLONIDINE HYDROCHLORIDE 0.1 MG/1
0.1 TABLET ORAL ONCE
Status: COMPLETED | OUTPATIENT
Start: 2023-02-06 | End: 2023-02-06

## 2023-02-06 RX ORDER — SODIUM CHLORIDE 0.9 % (FLUSH) 0.9 %
10 SYRINGE (ML) INJECTION AS NEEDED
Status: DISCONTINUED | OUTPATIENT
Start: 2023-02-06 | End: 2023-02-07 | Stop reason: HOSPADM

## 2023-02-06 RX ORDER — ROFLUMILAST 500 UG/1
500 TABLET ORAL DAILY
Status: DISCONTINUED | OUTPATIENT
Start: 2023-02-07 | End: 2023-02-07 | Stop reason: HOSPADM

## 2023-02-06 RX ORDER — ONDANSETRON 2 MG/ML
4 INJECTION INTRAMUSCULAR; INTRAVENOUS EVERY 6 HOURS PRN
Status: DISCONTINUED | OUTPATIENT
Start: 2023-02-06 | End: 2023-02-07 | Stop reason: HOSPADM

## 2023-02-06 RX ORDER — OLANZAPINE 10 MG/2ML
1 INJECTION, POWDER, LYOPHILIZED, FOR SOLUTION INTRAMUSCULAR
Status: DISCONTINUED | OUTPATIENT
Start: 2023-02-06 | End: 2023-02-07 | Stop reason: HOSPADM

## 2023-02-06 RX ORDER — DEXTROSE MONOHYDRATE 25 G/50ML
25 INJECTION, SOLUTION INTRAVENOUS
Status: DISCONTINUED | OUTPATIENT
Start: 2023-02-06 | End: 2023-02-07 | Stop reason: HOSPADM

## 2023-02-06 RX ORDER — INSULIN LISPRO 100 [IU]/ML
3-14 INJECTION, SOLUTION INTRAVENOUS; SUBCUTANEOUS
Status: DISCONTINUED | OUTPATIENT
Start: 2023-02-07 | End: 2023-02-07 | Stop reason: HOSPADM

## 2023-02-06 RX ORDER — PANTOPRAZOLE SODIUM 40 MG/10ML
80 INJECTION, POWDER, LYOPHILIZED, FOR SOLUTION INTRAVENOUS ONCE
Status: COMPLETED | OUTPATIENT
Start: 2023-02-06 | End: 2023-02-06

## 2023-02-06 RX ORDER — SODIUM CHLORIDE 0.9 % (FLUSH) 0.9 %
10 SYRINGE (ML) INJECTION EVERY 12 HOURS SCHEDULED
Status: DISCONTINUED | OUTPATIENT
Start: 2023-02-06 | End: 2023-02-07 | Stop reason: HOSPADM

## 2023-02-06 RX ORDER — LOSARTAN POTASSIUM 50 MG/1
100 TABLET ORAL DAILY
Status: DISCONTINUED | OUTPATIENT
Start: 2023-02-07 | End: 2023-02-07 | Stop reason: HOSPADM

## 2023-02-06 RX ORDER — METOCLOPRAMIDE HYDROCHLORIDE 5 MG/ML
10 INJECTION INTRAMUSCULAR; INTRAVENOUS ONCE
Status: COMPLETED | OUTPATIENT
Start: 2023-02-06 | End: 2023-02-06

## 2023-02-06 RX ORDER — LABETALOL HYDROCHLORIDE 5 MG/ML
10 INJECTION, SOLUTION INTRAVENOUS ONCE
Status: COMPLETED | OUTPATIENT
Start: 2023-02-06 | End: 2023-02-06

## 2023-02-06 RX ORDER — ALBUTEROL SULFATE 2.5 MG/3ML
2.5 SOLUTION RESPIRATORY (INHALATION) EVERY 6 HOURS PRN
Status: DISCONTINUED | OUTPATIENT
Start: 2023-02-06 | End: 2023-02-07 | Stop reason: HOSPADM

## 2023-02-06 RX ADMIN — LABETALOL HYDROCHLORIDE 10 MG: 5 INJECTION, SOLUTION INTRAVENOUS at 17:11

## 2023-02-06 RX ADMIN — IOPAMIDOL 100 ML: 755 INJECTION, SOLUTION INTRAVENOUS at 17:03

## 2023-02-06 RX ADMIN — SODIUM CHLORIDE 1000 ML: 9 INJECTION, SOLUTION INTRAVENOUS at 14:58

## 2023-02-06 RX ADMIN — METOCLOPRAMIDE 10 MG: 5 INJECTION, SOLUTION INTRAMUSCULAR; INTRAVENOUS at 15:29

## 2023-02-06 RX ADMIN — PANTOPRAZOLE SODIUM 80 MG: 40 INJECTION, POWDER, LYOPHILIZED, FOR SOLUTION INTRAVENOUS at 19:45

## 2023-02-06 RX ADMIN — CLONIDINE HYDROCHLORIDE 0.1 MG: 0.1 TABLET ORAL at 19:45

## 2023-02-06 RX ADMIN — HYDROMORPHONE HYDROCHLORIDE 1 MG: 1 INJECTION, SOLUTION INTRAMUSCULAR; INTRAVENOUS; SUBCUTANEOUS at 15:29

## 2023-02-06 RX ADMIN — HYDROMORPHONE HYDROCHLORIDE 0.5 MG: 1 INJECTION, SOLUTION INTRAMUSCULAR; INTRAVENOUS; SUBCUTANEOUS at 18:15

## 2023-02-06 RX ADMIN — ONDANSETRON 8 MG: 2 INJECTION INTRAMUSCULAR; INTRAVENOUS at 13:07

## 2023-02-06 NOTE — ED NOTES
Patient states abdominal pain since this morning. Vomiting, denies diarrhea or fever. Patient is diaphoretic.

## 2023-02-06 NOTE — ED NOTES
Patient states that he has been sick like this since this morning. Patient stated that he took his blood pressure and pain medication today, but was unable to keep it down due to the vomiting. Patient visibly shaking and grimacing. Patient states that he is light sensitive.

## 2023-02-06 NOTE — ED PROVIDER NOTES
Subjective      Provider in Triage Note  Patient is a 47-year-old male with a history of type 2 diabetes, reports abdominal discomfort upper abdominal with nausea vomiting unable to keep anything down.  Reports last time he took his diabetic medications was last night. Denies alcohol or cigarette smoking.     History of Present Illness  Provider in triage note reviewed and agree with above.  Additionally, patient reports he woke up this morning with nausea vomiting and pain in his upper abdomen mostly in the epigastric area nonradiating.  States has not been able to keep down any food or fluids today including this pain or blood pressure medication.  He does report that his symptoms seem to be worse when he tries to eat or drink anything.  He denies alleviating factors.  He denies fever chills cough congestion chest pain shortness of breath.  He denies diarrhea or constipation.        Review of Systems   Constitutional: Negative.    Respiratory: Negative.    Cardiovascular: Negative.    Gastrointestinal: Positive for abdominal pain, nausea and vomiting. Negative for constipation and diarrhea.   Genitourinary: Negative.        Past Medical History:   Diagnosis Date   • Hyperlipidemia    • Hypertension    • Knee pain    • Low back pain    • Neck pain    • Type 2 diabetes mellitus (HCC)        No Known Allergies    History reviewed. No pertinent surgical history.    Family History   Problem Relation Age of Onset   • Diabetes Maternal Grandmother    • Hypertension Maternal Grandmother    • Hyperlipidemia Maternal Grandmother    • Cancer Maternal Grandfather    • Cancer Father        Social History     Socioeconomic History   • Marital status:    Tobacco Use   • Smoking status: Former     Packs/day: 2.00     Years: 30.00     Pack years: 60.00     Types: Cigarettes     Quit date:      Years since quittin.1   • Smokeless tobacco: Former   Vaping Use   • Vaping Use: Never used   Substance and Sexual Activity    • Alcohol use: Not Currently     Comment: new years   • Drug use: Not Currently     Types: Marijuana     Comment: used in the past 4/20/22   • Sexual activity: Not Currently     Partners: Female           Objective   Physical Exam  Vital signs and triage nurse note reviewed.  Constitutional: Awake, alert; well-developed and well-nourished. No acute distress is noted.  Appears uncomfortable.  HEENT: Normocephalic, atraumatic; pupils are PERRL with intact EOM; oropharynx is pink and moist without exudate or erythema.  No drooling or pooling of oral secretions.  Neck: Supple, full range of motion without pain; no cervical lymphadenopathy. Normal phonation.  Cardiovascular: Regular rate and rhythm, normal S1-S2.  No murmur noted.  Pulmonary: Respiratory effort regular nonlabored, breath sounds clear to auscultation all fields.  Abdomen: Soft, mildly over the epigastrium tender, nondistended with normoactive bowel sounds; no rebound or guarding.  Musculoskeletal: Independent range of motion of all extremities with no palpable tenderness or edema.  Neuro: Alert oriented x3, speech is clear and appropriate, GCS 15.    Skin: Flesh tone, warm, dry, intact; no erythematous or petechial rash or lesion.    Procedures           ED Course      Labs Reviewed   COMPREHENSIVE METABOLIC PANEL - Abnormal; Notable for the following components:       Result Value    Glucose 276 (*)     All other components within normal limits    Narrative:     GFR Normal >60  Chronic Kidney Disease <60  Kidney Failure <15     LIPASE - Abnormal; Notable for the following components:    Lipase 110 (*)     All other components within normal limits   URINALYSIS W/ MICROSCOPIC IF INDICATED (NO CULTURE) - Abnormal; Notable for the following components:    Glucose, UA >=1000 mg/dL (3+) (*)     Ketones, UA 15 mg/dL (1+) (*)     Protein, UA 30 mg/dL (1+) (*)     All other components within normal limits   URINALYSIS, MICROSCOPIC ONLY - Abnormal; Notable for  the following components:    RBC, UA 0-2 (*)     WBC, UA 0-2 (*)     All other components within normal limits   POCT GLUCOSE FINGERSTICK - Abnormal; Notable for the following components:    Glucose 251 (*)     All other components within normal limits   COVID-19 AND FLU A/B, NP SWAB IN TRANSPORT MEDIA 8-12 HR TAT - Normal    Narrative:     Fact sheet for providers: https://www.fda.gov/media/660970/download    Fact sheet for patients: https://www.fda.gov/media/944539/download    Test performed by PCR.   CBC WITH AUTO DIFFERENTIAL - Normal   CBC AND DIFFERENTIAL    Narrative:     The following orders were created for panel order CBC & Differential.  Procedure                               Abnormality         Status                     ---------                               -----------         ------                     CBC Auto Differential[[id number]]        Normal              Final result                 Please view results for these tests on the individual orders.     CT Abdomen Pelvis With Contrast    Result Date: 2/6/2023  1. Fat-containing ventral hernia in the upper abdomen. 2. No acute intra-abdominal or intrapelvic abnormality appreciated. 3. Other incidental findings as above Electronically Signed: John Horn  2/6/2023 5:16 PM EST  Workstation ID: [id number]    Medications   sodium chloride 0.9 % flush 10 mL (has no administration in time range)   sodium chloride 0.9 % flush 10 mL (has no administration in time range)   sodium chloride 0.9 % flush 10 mL (has no administration in time range)   sodium chloride 0.9 % infusion 40 mL (has no administration in time range)   acetaminophen (TYLENOL) tablet 650 mg (has no administration in time range)   ondansetron (ZOFRAN) injection 4 mg (has no administration in time range)   melatonin tablet 5 mg (has no administration in time range)   pantoprazole (PROTONIX) injection 80 mg (has no administration in time range)   cloNIDine (CATAPRES) tablet 0.1 mg (has no  administration in time range)   sodium chloride 0.9 % bolus 1,000 mL (0 mL Intravenous Stopped 2/6/23 1550)   ondansetron (ZOFRAN) injection 8 mg (8 mg Intravenous Given 2/6/23 1307)   HYDROmorphone (DILAUDID) injection 1 mg (1 mg Intravenous Given 2/6/23 1529)   metoclopramide (REGLAN) injection 10 mg (10 mg Intravenous Given 2/6/23 1529)   labetalol (NORMODYNE,TRANDATE) injection 10 mg (10 mg Intravenous Given 2/6/23 1711)   iopamidol (ISOVUE-370) 76 % injection 100 mL (100 mL Intravenous Given 2/6/23 1703)   HYDROmorphone (DILAUDID) injection 0.5 mg (0.5 mg Intravenous Given 2/6/23 1815)                                          Medical Decision Making  Patient presents with upper abdominal pain nausea and vomiting since this morning.  No fever chest pain diarrhea constipation.  Differentials include gastritis, peptic ulcer, gastroparesis, pancreatitis, cholecystitis, cholelithiasis, dehydration or electrolyte disturbance.    Patient had above exam and evaluation.  An IV was established.  He had labs and CT obtained.  He was given a liter of fluids as well as Zofran for nausea.  He was subsequently given Reglan for nausea control.  He was also given IV Dilaudid for pain.    Lab interpretation reveals unremarkable CBC with normal white blood cell count, metabolic panel significant for glucose 276 otherwise unremarkable.  Lipase 110.  Urinalysis significant for greater than 1000 glucose, 15 ketones, 30 protein.  Flu and COVID-negative.  CT of the abdomen pelvis reviewed and interpreted by myself but corroborated by radiologist reading reveals a fat-containing ventral hernia in the upper abdomen with no acute intra-abdominal or intrapelvic abnormality.    Patient has had elevated blood pressure readings while in the ED today and states has not been able to keep down his blood pressure medication.  He is given a dose of IV labetalol with little improvement in his blood pressure.  He was subsequently given oral  clonidine which he tolerated.    Reexamination he reports continued nausea and continued pain though he is tolerating p.o.  Fluids.  Findings were discussed with him.  We discussed discharge home however he states he feels like he will not be able to tolerate pain or nausea at home.  He will be placed in observation unit for IV fluids and antiemetics, possible GI consultation tomorrow if symptoms not improved.      Nausea and vomiting, unspecified vomiting type: chronic illness or injury  Pain of upper abdomen: acute illness or injury  Amount and/or Complexity of Data Reviewed  ECG/medicine tests: ordered.      Risk  OTC drugs.  Prescription drug management.  Decision regarding hospitalization.          Final diagnoses:   Nausea and vomiting, unspecified vomiting type   Pain of upper abdomen       ED Disposition  ED Disposition     ED Disposition   Decision to Admit    Condition   --    Comment   --             No follow-up provider specified.       Medication List      No changes were made to your prescriptions during this visit.          Vikki Flores, LISA  02/06/23 1928

## 2023-02-07 VITALS
BODY MASS INDEX: 28.49 KG/M2 | DIASTOLIC BLOOD PRESSURE: 90 MMHG | SYSTOLIC BLOOD PRESSURE: 151 MMHG | TEMPERATURE: 98.6 F | WEIGHT: 214.95 LBS | RESPIRATION RATE: 16 BRPM | OXYGEN SATURATION: 97 % | HEART RATE: 89 BPM | HEIGHT: 73 IN

## 2023-02-07 LAB
ANION GAP SERPL CALCULATED.3IONS-SCNC: 12 MMOL/L (ref 5–15)
BASOPHILS # BLD AUTO: 0 10*3/MM3 (ref 0–0.2)
BASOPHILS NFR BLD AUTO: 0.3 % (ref 0–1.5)
BUN SERPL-MCNC: 12 MG/DL (ref 6–20)
BUN/CREAT SERPL: 15.8 (ref 7–25)
CALCIUM SPEC-SCNC: 9.1 MG/DL (ref 8.6–10.5)
CHLORIDE SERPL-SCNC: 104 MMOL/L (ref 98–107)
CO2 SERPL-SCNC: 22 MMOL/L (ref 22–29)
CREAT SERPL-MCNC: 0.76 MG/DL (ref 0.76–1.27)
DEPRECATED RDW RBC AUTO: 40.7 FL (ref 37–54)
EGFRCR SERPLBLD CKD-EPI 2021: 110.9 ML/MIN/1.73
EOSINOPHIL # BLD AUTO: 0 10*3/MM3 (ref 0–0.4)
EOSINOPHIL NFR BLD AUTO: 0.2 % (ref 0.3–6.2)
ERYTHROCYTE [DISTWIDTH] IN BLOOD BY AUTOMATED COUNT: 13.2 % (ref 12.3–15.4)
GLUCOSE BLDC GLUCOMTR-MCNC: 220 MG/DL (ref 70–105)
GLUCOSE BLDC GLUCOMTR-MCNC: 226 MG/DL (ref 70–105)
GLUCOSE SERPL-MCNC: 236 MG/DL (ref 65–99)
HCT VFR BLD AUTO: 42.4 % (ref 37.5–51)
HGB BLD-MCNC: 14.6 G/DL (ref 13–17.7)
LIPASE SERPL-CCNC: 24 U/L (ref 13–60)
LYMPHOCYTES # BLD AUTO: 1.7 10*3/MM3 (ref 0.7–3.1)
LYMPHOCYTES NFR BLD AUTO: 13.2 % (ref 19.6–45.3)
MCH RBC QN AUTO: 30.2 PG (ref 26.6–33)
MCHC RBC AUTO-ENTMCNC: 34.4 G/DL (ref 31.5–35.7)
MCV RBC AUTO: 87.7 FL (ref 79–97)
MONOCYTES # BLD AUTO: 0.6 10*3/MM3 (ref 0.1–0.9)
MONOCYTES NFR BLD AUTO: 4.6 % (ref 5–12)
NEUTROPHILS NFR BLD AUTO: 10.7 10*3/MM3 (ref 1.7–7)
NEUTROPHILS NFR BLD AUTO: 81.7 % (ref 42.7–76)
NRBC BLD AUTO-RTO: 0 /100 WBC (ref 0–0.2)
PLATELET # BLD AUTO: 196 10*3/MM3 (ref 140–450)
PMV BLD AUTO: 8.8 FL (ref 6–12)
POTASSIUM SERPL-SCNC: 4 MMOL/L (ref 3.5–5.2)
RBC # BLD AUTO: 4.83 10*6/MM3 (ref 4.14–5.8)
SODIUM SERPL-SCNC: 138 MMOL/L (ref 136–145)
WBC NRBC COR # BLD: 13.1 10*3/MM3 (ref 3.4–10.8)

## 2023-02-07 PROCEDURE — 63710000001 INSULIN GLARGINE PER 5 UNITS: Performed by: NURSE PRACTITIONER

## 2023-02-07 PROCEDURE — G0378 HOSPITAL OBSERVATION PER HR: HCPCS

## 2023-02-07 PROCEDURE — 80048 BASIC METABOLIC PNL TOTAL CA: CPT | Performed by: NURSE PRACTITIONER

## 2023-02-07 PROCEDURE — 83690 ASSAY OF LIPASE: CPT | Performed by: NURSE PRACTITIONER

## 2023-02-07 PROCEDURE — 85025 COMPLETE CBC W/AUTO DIFF WBC: CPT | Performed by: NURSE PRACTITIONER

## 2023-02-07 PROCEDURE — 82962 GLUCOSE BLOOD TEST: CPT

## 2023-02-07 RX ORDER — ONDANSETRON 4 MG/1
4 TABLET, FILM COATED ORAL EVERY 8 HOURS PRN
Qty: 30 TABLET | Refills: 0 | Status: SHIPPED | OUTPATIENT
Start: 2023-02-07 | End: 2023-02-22

## 2023-02-07 RX ORDER — SODIUM CHLORIDE 9 MG/ML
100 INJECTION, SOLUTION INTRAVENOUS CONTINUOUS
Status: DISCONTINUED | OUTPATIENT
Start: 2023-02-07 | End: 2023-02-07 | Stop reason: HOSPADM

## 2023-02-07 RX ORDER — BUDESONIDE AND FORMOTEROL FUMARATE DIHYDRATE 160; 4.5 UG/1; UG/1
2 AEROSOL RESPIRATORY (INHALATION)
Status: DISCONTINUED | OUTPATIENT
Start: 2023-02-07 | End: 2023-02-07 | Stop reason: HOSPADM

## 2023-02-07 RX ADMIN — LOSARTAN POTASSIUM 100 MG: 50 TABLET, FILM COATED ORAL at 08:17

## 2023-02-07 RX ADMIN — SODIUM CHLORIDE 100 ML/HR: 9 INJECTION, SOLUTION INTRAVENOUS at 11:26

## 2023-02-07 RX ADMIN — PREGABALIN 200 MG: 25 CAPSULE ORAL at 08:17

## 2023-02-07 RX ADMIN — INSULIN GLARGINE 20 UNITS: 100 INJECTION, SOLUTION SUBCUTANEOUS at 08:17

## 2023-02-07 NOTE — DISCHARGE SUMMARY
[facility]    Nimo Rodas MD    CHIEF COMPLAINT:     Vomiting     HISTORY OF PRESENT ILLNESS:    HPI    Patient is a 47-year-old male with a history of type 2 diabetes, reports abdominal discomfort upper abdominal with nausea vomiting unable to keep anything down.  Reports last time he took his diabetic medications was last night. Denies alcohol or cigarette smoking.      History of Present Illness  Provider in triage note reviewed and agree with above.  Additionally, patient reports he woke up this morning with nausea vomiting and pain in his upper abdomen mostly in the epigastric area nonradiating.  States has not been able to keep down any food or fluids today including this pain or blood pressure medication.  He does report that his symptoms seem to be worse when he tries to eat or drink anything.  He denies alleviating factors.  He denies fever chills cough congestion chest pain shortness of breath.  He denies diarrhea or constipation.    Past Medical History:   Diagnosis Date   • Hyperlipidemia    • Hypertension    • Knee pain    • Low back pain    • Neck pain    • Type 2 diabetes mellitus (HCC)      History reviewed. No pertinent surgical history.  Family History   Problem Relation Age of Onset   • Diabetes Maternal Grandmother    • Hypertension Maternal Grandmother    • Hyperlipidemia Maternal Grandmother    • Cancer Maternal Grandfather    • Cancer Father      Social History     Tobacco Use   • Smoking status: Former     Packs/day: 2.00     Years: 30.00     Pack years: 60.00     Types: Cigarettes     Quit date: 2017     Years since quittin.1   • Smokeless tobacco: Former   Vaping Use   • Vaping Use: Never used   Substance Use Topics   • Alcohol use: Not Currently     Comment: new years   • Drug use: Not Currently     Types: Marijuana     Comment: used in the past 22     Medications Prior to Admission   Medication Sig Dispense Refill Last Dose   • Accu-Chek Softclix  Lancets lancets 1 each by Other route 2 (Two) Times a Day. Use to monitor glucose twice daily. 100 each 5 2/6/2023   • atorvastatin (LIPITOR) 40 MG tablet TAKE 1 TABLET BY MOUTH EVERY NIGHT 90 tablet 1 2/6/2023   • Levemir FlexTouch 100 UNIT/ML injection ADMINISTER 20 UNITS UNDER THE SKIN DAILY AS DIRECTED 18 mL 1 2/6/2023   • losartan (COZAAR) 100 MG tablet Take 1 tablet by mouth Daily. 90 tablet 1 2/6/2023   • pregabalin (LYRICA) 200 MG capsule Take 1 capsule by mouth 2 (Two) Times a Day. 60 capsule 2 2/6/2023 at 0800   • SITagliptin-metFORMIN HCl ER (Janumet XR)  MG tablet Take 2 tablets by mouth Daily. 180 tablet 1 2/6/2023   • traMADol (ULTRAM) 50 MG tablet Take 1 tablet by mouth Every 6 (Six) Hours As Needed for Moderate Pain. 120 tablet 0 2/6/2023   • albuterol sulfate  (90 Base) MCG/ACT inhaler       • B-D UF III MINI PEN NEEDLES 31G X 5 MM misc       • Budeson-Glycopyrrol-Formoterol (Breztri Aerosphere) 160-9-4.8 MCG/ACT aerosol inhaler Inhale 2 puffs 2 (Two) Times a Day. Rinse mouth after each use 10.7 g 5    • EPINEPHrine (EPIPEN) 0.3 MG/0.3ML solution auto-injector injection       • glucose blood test strip Use to monitor glucose twice daily. Dx: E11.69 100 each 3    • glucose monitor monitoring kit Use to monitor glucose twice a day. Dx: E11.69 1 each 0      Allergies:  Patient has no known allergies.      There is no immunization history on file for this patient.        REVIEW OF SYSTEMS:    Review of Systems   Constitutional: Positive for decreased appetite and malaise/fatigue.   HENT: Negative.    Eyes: Negative.    Cardiovascular: Negative.    Respiratory: Negative.    Endocrine: Negative.    Hematologic/Lymphatic: Negative.    Skin: Negative.    Musculoskeletal: Negative.    Gastrointestinal: Positive for abdominal pain, nausea and vomiting.   Genitourinary: Negative.    Neurological: Positive for weakness.   Psychiatric/Behavioral: Negative.    Allergic/Immunologic: Negative.         Vital Signs  Temp:  [97.5 °F (36.4 °C)-98.6 °F (37 °C)] 98.6 °F (37 °C)  Heart Rate:  [60-89] 89  Resp:  [16-20] 16  BP: (151-187)/() 151/90          Physical Exam:  Physical Exam  Vitals and nursing note reviewed.   Constitutional:       Appearance: Normal appearance.   HENT:      Head: Normocephalic and atraumatic.      Right Ear: External ear normal.      Left Ear: External ear normal.      Nose: Nose normal.      Mouth/Throat:      Mouth: Mucous membranes are moist.      Pharynx: Oropharynx is clear.   Eyes:      Extraocular Movements: Extraocular movements intact.      Conjunctiva/sclera: Conjunctivae normal.      Pupils: Pupils are equal, round, and reactive to light.   Cardiovascular:      Rate and Rhythm: Normal rate and regular rhythm.      Pulses: Normal pulses.      Heart sounds: Normal heart sounds.   Pulmonary:      Effort: Pulmonary effort is normal.      Breath sounds: Normal breath sounds.   Abdominal:      General: Bowel sounds are normal.      Palpations: Abdomen is soft.      Hernia: A hernia is present.   Musculoskeletal:         General: Normal range of motion.      Cervical back: Normal range of motion.   Skin:     General: Skin is warm.      Capillary Refill: Capillary refill takes less than 2 seconds.   Neurological:      General: No focal deficit present.      Mental Status: He is alert and oriented to person, place, and time.   Psychiatric:         Mood and Affect: Mood normal.         Behavior: Behavior normal.         Thought Content: Thought content normal.         Judgment: Judgment normal.         Emotional Behavior:    WNL   Debilities:   none  Results Review:    I reviewed the patient's new clinical results.  Lab Results (most recent)     Procedure Component Value Units Date/Time    POC Glucose Once [[id number]]  (Abnormal) Collected: 02/07/23 1130    Specimen: Blood Updated: 02/07/23 1131     Glucose 226 mg/dL      Comment: Serial Number: [id number]:  [id number]        Lipase [[id number]]  (Normal) Collected: 02/07/23 0511    Specimen: Blood Updated: 02/07/23 0746     Lipase 24 U/L     POC Glucose Once [[id number]]  (Abnormal) Collected: 02/07/23 0712    Specimen: Blood Updated: 02/07/23 0713     Glucose 220 mg/dL      Comment: Serial Number: [id number]:  [id number]       CBC Auto Differential [[id number]]  (Abnormal) Collected: 02/07/23 0511    Specimen: Blood Updated: 02/07/23 0624     WBC 13.10 10*3/mm3      RBC 4.83 10*6/mm3      Hemoglobin 14.6 g/dL      Hematocrit 42.4 %      MCV 87.7 fL      MCH 30.2 pg      MCHC 34.4 g/dL      RDW 13.2 %      RDW-SD 40.7 fl      MPV 8.8 fL      Platelets 196 10*3/mm3      Neutrophil % 81.7 %      Lymphocyte % 13.2 %      Monocyte % 4.6 %      Eosinophil % 0.2 %      Basophil % 0.3 %      Neutrophils, Absolute 10.70 10*3/mm3      Lymphocytes, Absolute 1.70 10*3/mm3      Monocytes, Absolute 0.60 10*3/mm3      Eosinophils, Absolute 0.00 10*3/mm3      Basophils, Absolute 0.00 10*3/mm3      nRBC 0.0 /100 WBC     Basic Metabolic Panel [[id number]]  (Abnormal) Collected: 02/07/23 0511    Specimen: Blood Updated: 02/07/23 0620     Glucose 236 mg/dL      BUN 12 mg/dL      Creatinine 0.76 mg/dL      Sodium 138 mmol/L      Potassium 4.0 mmol/L      Chloride 104 mmol/L      CO2 22.0 mmol/L      Calcium 9.1 mg/dL      BUN/Creatinine Ratio 15.8     Anion Gap 12.0 mmol/L      eGFR 110.9 mL/min/1.73     Narrative:      GFR Normal >60  Chronic Kidney Disease <60  Kidney Failure <15      COVID-19 and FLU A/B PCR - Swab, Nasopharynx [[id number]]  (Normal) Collected: 02/06/23 1533    Specimen: Swab from Nasopharynx Updated: 02/06/23 1600     COVID19 Not Detected     Influenza A PCR Not Detected     Influenza B PCR Not Detected    Narrative:      Fact sheet for providers: https://www.fda.gov/media/001561/download    Fact sheet for patients: https://www.fda.gov/media/187074/download    Test performed by PCR.    Comprehensive Metabolic Panel [[id number]]   (Abnormal) Collected: 02/06/23 1305    Specimen: Blood from Arm, Left Updated: 02/06/23 1337     Glucose 276 mg/dL      BUN 17 mg/dL      Creatinine 0.80 mg/dL      Sodium 140 mmol/L      Potassium 5.0 mmol/L      Chloride 105 mmol/L      CO2 24.0 mmol/L      Calcium 10.1 mg/dL      Total Protein 7.2 g/dL      Albumin 4.8 g/dL      ALT (SGPT) 23 U/L      AST (SGOT) 17 U/L      Alkaline Phosphatase 97 U/L      Total Bilirubin 0.5 mg/dL      Globulin 2.4 gm/dL      A/G Ratio 2.0 g/dL      BUN/Creatinine Ratio 21.3     Anion Gap 11.0 mmol/L      eGFR 109.2 mL/min/1.73     Narrative:      GFR Normal >60  Chronic Kidney Disease <60  Kidney Failure <15      Lipase [[id number]]  (Abnormal) Collected: 02/06/23 1305    Specimen: Blood from Arm, Left Updated: 02/06/23 1337     Lipase 110 U/L     Urinalysis, Microscopic Only - Urine, Clean Catch [[id number]]  (Abnormal) Collected: 02/06/23 1312    Specimen: Urine, Clean Catch Updated: 02/06/23 1320     RBC, UA 0-2 /HPF      WBC, UA 0-2 /HPF      Bacteria, UA None Seen /HPF      Squamous Epithelial Cells, UA 0-2 /HPF      Hyaline Casts, UA None Seen /LPF      Methodology Automated Microscopy    Urinalysis With Microscopic If Indicated (No Culture) - Urine, Clean Catch [[id number]]  (Abnormal) Collected: 02/06/23 1312    Specimen: Urine, Clean Catch Updated: 02/06/23 1318     Color, UA Yellow     Appearance, UA Clear     pH, UA 8.0     Specific Gravity, UA 1.029     Glucose, UA >=1000 mg/dL (3+)     Ketones, UA 15 mg/dL (1+)     Bilirubin, UA Negative     Blood, UA Negative     Protein, UA 30 mg/dL (1+)     Leuk Esterase, UA Negative     Nitrite, UA Negative     Urobilinogen, UA 0.2 E.U./dL    CBC & Differential [[id number]]  (Normal) Collected: 02/06/23 1305    Specimen: Blood from Arm, Left Updated: 02/06/23 1316    Narrative:      The following orders were created for panel order CBC & Differential.  Procedure                               Abnormality         Status                      ---------                               -----------         ------                     CBC Auto Differential[[id number]]        Normal              Final result                 Please view results for these tests on the individual orders.    CBC Auto Differential [[id number]]  (Normal) Collected: 02/06/23 1305    Specimen: Blood from Arm, Left Updated: 02/06/23 1316     WBC 8.30 10*3/mm3      RBC 5.01 10*6/mm3      Hemoglobin 15.6 g/dL      Hematocrit 44.4 %      MCV 88.6 fL      MCH 31.1 pg      MCHC 35.1 g/dL      RDW 13.3 %      RDW-SD 40.7 fl      MPV 8.2 fL      Platelets 168 10*3/mm3      Neutrophil % 69.4 %      Lymphocyte % 20.3 %      Monocyte % 6.4 %      Eosinophil % 2.7 %      Basophil % 1.2 %      Neutrophils, Absolute 5.80 10*3/mm3      Lymphocytes, Absolute 1.70 10*3/mm3      Monocytes, Absolute 0.50 10*3/mm3      Eosinophils, Absolute 0.20 10*3/mm3      Basophils, Absolute 0.10 10*3/mm3      nRBC 0.0 /100 WBC           Imaging Results (Most Recent)     Procedure Component Value Units Date/Time    CT Abdomen Pelvis With Contrast [[id number]] Collected: 02/06/23 1707     Updated: 02/06/23 1718    Narrative:      CT ABDOMEN PELVIS W CONTRAST    Date of Exam: 2/6/2023 4:53 PM EST    Indication: epigastric pain, n/v.    Comparison: CT chest 1/27/2022    Technique: Axial CT images were obtained of the abdomen and pelvis following the uneventful intravenous administration of iodinated contrast. Sagittal and coronal reconstructions were performed.  Automated exposure control and iterative reconstruction   methods were used.       FINDINGS:    Abdomen/Pelvis:    Lower Chest: Limited imaging of the lung bases is grossly clear    No free air is noted below the diaphragm.     Organs: The liver, gallbladder, left kidney, spleen, pancreas and adrenal glands are grossly unremarkable in appearance.    Low-attenuation lesion within the superior pole right kidney is fluid density cyst. This measures 5 cm.      GI/Bowel: Stomach and small bowel demonstrate no acute abnormality. 2.0 x 1.3 cm defect within the anterior abdominal wall in the epigastric region containing fat is noted. No bowel involvement appreciated.    There is no suspicious mesenteric adenopathy or fluid collection.    The colon is grossly unremarkable in appearance. Ileocecal valve is unremarkable. The appendix is best seen on the coronal imaging and appears within normal limits.     Pelvis: Urinary bladder, prostate and pelvic structures are grossly unremarkable in appearance.    Peritoneum/Retroperitoneum: The aorta is normal in caliber. There is no suspicious retroperitoneal adenopathy.    Bones/Soft Tissues: Degenerative changes noted of the spine greatest at L5-S1.      Impression:        1. Fat-containing ventral hernia in the upper abdomen.  2. No acute intra-abdominal or intrapelvic abnormality appreciated.  3. Other incidental findings as above      Electronically Signed: John Horn    2/6/2023 5:16 PM EST    Workstation ID: [id number]        reviewed    ECG/EMG Results (most recent)     None        reviewed            Microbiology Results (last 10 days)     Procedure Component Value - Date/Time    COVID-19 and FLU A/B PCR - Swab, Nasopharynx [[id number]]  (Normal) Collected: 02/06/23 1533    Lab Status: Final result Specimen: Swab from Nasopharynx Updated: 02/06/23 1600     COVID19 Not Detected     Influenza A PCR Not Detected     Influenza B PCR Not Detected    Narrative:      Fact sheet for providers: https://www.fda.gov/media/725542/download    Fact sheet for patients: https://www.fda.gov/media/504902/download    Test performed by PCR.          Assessment & Plan     Vomiting     Vomiting  -Alkaline phosphate 97, ALT 23, AST 17  -Initial lipase 110 with repeat of 24  -WBC 8.3 with increased 13.10  -Urinalysis shows glucose over thousand, ketones, protein, 0-2 WBC, 0-2 RBC  -COVID and flu negative  -IV/oral analgesics as needed   -IV/oral  antiemetics as needed   -continue IV fluids  -CT ab pelvis showed fat-containing ventral hernia in upper abdomen with otherwise no acute and or abnormal intrapelvic abnormalities    Diabetes mellitus type 2  -Monitor blood glucose before meals and at bedtime   -sliding scale insulin initiated   -hold p.o. diabetic medication  -Last glucose 226    Peripheral neuropathy  -Continue Lyrica    Chronic essential hypertension  -Continue losartan  -Continuous cardiac monitoring   -monitor BMP    Chronic obstructive pulmonary disease  -Continue home bronchodilators   -continue Daliresp    Hyperlipidemia  -Continue statin      I discussed the patients findings and my recommendations with patient and family.     Discharge Diagnosis:      Vomiting      Hospital Course  Patient is a 47 y.o. male presented with vomiting.  Patient presented with abdominal pain and vomiting yesterday morning.  Patient states he woke up and started vomiting.  Patient denies known known ill contacts.  COVID and flu negative.  WBC within normal limits. Alkaline phosphate 97, ALT 23, AST 17.   Initial lipase 110 with improved to 24.  EKG shows sinus tachycardia heart rate 122 with continuous cardiac monitoring unit.  Urinalysis shows glucose over thousand, ketones, 30 protein, 0-2 RBC, 30 WBC.  CT on pelvis shows fat-containing ventral hernia in upper abdomen with no acute intra-abdominal intrapelvic abnormalities. Patient given IV and oral analgesics and antiemetics.  Patient given IV fluids and is has not had any vomiting overnight.  Patient afebrile hemodynamically stable upon discharge.  Patient to follow-up with GI neck scheduled appointment.  Patient to follow PCP in 1 week for continued care management.  Tests and recommendations reviewed with patient and he agreed to treatment plan.  If symptoms worsen patient to call 911 or go to nearest ED.    Past Medical History:     Past Medical History:   Diagnosis Date   • Hyperlipidemia    •  Hypertension    • Knee pain    • Low back pain    • Neck pain    • Type 2 diabetes mellitus (HCC)        Past Surgical History:   History reviewed. No pertinent surgical history.    Social History:   Social History     Socioeconomic History   • Marital status:    Tobacco Use   • Smoking status: Former     Packs/day: 2.00     Years: 30.00     Pack years: 60.00     Types: Cigarettes     Quit date:      Years since quittin.1   • Smokeless tobacco: Former   Vaping Use   • Vaping Use: Never used   Substance and Sexual Activity   • Alcohol use: Not Currently     Comment: new years   • Drug use: Not Currently     Types: Marijuana     Comment: used in the past 22   • Sexual activity: Not Currently     Partners: Female       Procedures Performed         Consults:   Consults     No orders found for last 30 day(s).          Condition on Discharge:     Stable    Discharge Disposition  Home or Self Care    Discharge Medications     Discharge Medications      New Medications      Instructions Start Date   ondansetron 4 MG tablet  Commonly known as: Zofran   4 mg, Oral, Every 8 Hours PRN         Continue These Medications      Instructions Start Date   Accu-Chek Softclix Lancets lancets   1 each, Other, 2 Times Daily, Use to monitor glucose twice daily.      albuterol sulfate  (90 Base) MCG/ACT inhaler  Commonly known as: PROVENTIL HFA;VENTOLIN HFA;PROAIR HFA   No dose, route, or frequency recorded.      atorvastatin 40 MG tablet  Commonly known as: LIPITOR   40 mg, Oral, Nightly      B-D UF III MINI PEN NEEDLES 31G X 5 MM misc  Generic drug: Insulin Pen Needle   No dose, route, or frequency recorded.      Breztri Aerosphere 160-9-4.8 MCG/ACT aerosol inhaler  Generic drug: Budeson-Glycopyrrol-Formoterol   2 puffs, Inhalation, 2 Times Daily, Rinse mouth after each use      EPINEPHrine 0.3 MG/0.3ML solution auto-injector injection  Commonly known as: EPIPEN   No dose, route, or frequency recorded.       glucose blood test strip   Use to monitor glucose twice daily. Dx: E11.69      glucose monitor monitoring kit   Use to monitor glucose twice a day. Dx: E11.69      Janumet XR  MG tablet  Generic drug: SITagliptin-metFORMIN HCl ER   2 tablets, Oral, Daily      Levemir FlexTouch 100 UNIT/ML injection  Generic drug: insulin detemir   ADMINISTER 20 UNITS UNDER THE SKIN DAILY AS DIRECTED      losartan 100 MG tablet  Commonly known as: COZAAR   100 mg, Oral, Daily      pregabalin 200 MG capsule  Commonly known as: LYRICA   200 mg, Oral, 2 Times Daily      traMADol 50 MG tablet  Commonly known as: ULTRAM   50 mg, Oral, Every 6 Hours PRN             Discharge Diet:   Diet Instructions     Diet: Consistent Carbohydrate, Cardiac      Discharge Diet:  Consistent Carbohydrate  Cardiac             Activity at Discharge:   Activity Instructions     Activity as Tolerated      Measure Blood Pressure            Follow-up Appointments  Future Appointments   Date Time Provider Department Center   2/22/2023  2:30 PM Aaron Mathis MD [facility]     Additional Instructions for the Follow-ups that You Need to Schedule     Discharge Follow-up with PCP   As directed       Currently Documented PCP:    Nimo Rodas MD    PCP Phone Number:    None     Follow Up Details: 7-10 days               Test Results Pending at Discharge       Risk for Readmission (LACE) Score: 6 (2/7/2023  6:00 AM)          LISA Fairbanks  02/07/23  14:37 EST

## 2023-02-07 NOTE — PLAN OF CARE
Goal Outcome Evaluation:  Plan of Care Reviewed With: patient        Progress: improving  Outcome Evaluation: Pt to d/c today

## 2023-02-07 NOTE — CASE MANAGEMENT/SOCIAL WORK
Discharge Planning Assessment   Pipe     Patient Name: Aj Plaza  MRN: [id number]  Today's Date: 2/7/2023    Admit Date: 2/6/2023    Plan: Home with family.   Discharge Needs Assessment     Row Name 02/07/23 1337       Living Environment    People in Home child(yolanda), adult    Current Living Arrangements apartment    Primary Care Provided by self    Provides Primary Care For no one    Family Caregiver if Needed child(yolanda), adult    Quality of Family Relationships helpful;involved;supportive    Able to Return to Prior Arrangements yes       Resource/Environmental Concerns    Resource/Environmental Concerns none    Transportation Concerns none       Transition Planning    Patient/Family Anticipates Transition to home with family    Patient/Family Anticipated Services at Transition none    Transportation Anticipated family or friend will provide       Discharge Needs Assessment    Readmission Within the Last 30 Days no previous admission in last 30 days    Equipment Currently Used at Home walker, rolling    Concerns to be Addressed no discharge needs identified;denies needs/concerns at this time    Anticipated Changes Related to Illness none    Equipment Needed After Discharge none               Discharge Plan     Row Name 02/07/23 3701       Plan    Plan Home with family.    Patient/Family in Agreement with Plan yes    Plan Comments Patient lives at home with adult children. Patient drives, patient's daughter will provide transportation at discharge. Patient performs ADL. PCP and pharmacy confirmed. Denies financial assistance for medication and/or food. Denies HH and/or rehab needs.               Demographic Summary     Row Name 02/07/23 1337       General Information    Admission Type observation    Arrived From emergency department    Required Notices Provided Observation Status Notice    Referral Source admission list    Reason for Consult discharge planning    Preferred Language English                Functional Status     Row Name 02/07/23 1337       Functional Status    Usual Activity Tolerance good    Current Activity Tolerance good       Functional Status, IADL    Medications independent    Meal Preparation independent    Housekeeping independent    Laundry independent    Shopping independent                      Patient Forms     Row Name 02/07/23 1130       Patient Forms    Important Message from Medicare ([facility]) --  Schultz reviewed, signed, copy given    Patient Observation Letter Delivered  Schultz reviewed, signed, copy given    Delivered to Patient    Method of delivery In person              Met with patient in room wearing PPE: mask.      Maintained distance greater than six feet and spent less than 15 minutes in the room.          Ellen Klein RN

## 2023-02-21 DIAGNOSIS — Z79.4 TYPE 2 DIABETES MELLITUS WITH OTHER SPECIFIED COMPLICATION, WITH LONG-TERM CURRENT USE OF INSULIN: ICD-10-CM

## 2023-02-21 DIAGNOSIS — E11.69 TYPE 2 DIABETES MELLITUS WITH OTHER SPECIFIED COMPLICATION, WITH LONG-TERM CURRENT USE OF INSULIN: ICD-10-CM

## 2023-02-21 RX ORDER — SITAGLIPTIN AND METFORMIN HYDROCHLORIDE 1000; 50 MG/1; MG/1
2 TABLET, FILM COATED, EXTENDED RELEASE ORAL DAILY
Qty: 14 TABLET | Refills: 0 | Status: SHIPPED | OUTPATIENT
Start: 2023-02-21 | End: 2023-02-22 | Stop reason: SDUPTHER

## 2023-02-22 ENCOUNTER — OFFICE VISIT (OUTPATIENT)
Dept: PAIN MEDICINE | Facility: CLINIC | Age: 48
End: 2023-02-22
Payer: MEDICARE

## 2023-02-22 ENCOUNTER — TELEPHONE (OUTPATIENT)
Dept: FAMILY MEDICINE CLINIC | Facility: CLINIC | Age: 48
End: 2023-02-22

## 2023-02-22 ENCOUNTER — OFFICE VISIT (OUTPATIENT)
Dept: FAMILY MEDICINE CLINIC | Facility: CLINIC | Age: 48
End: 2023-02-22
Payer: MEDICARE

## 2023-02-22 VITALS
HEART RATE: 103 BPM | OXYGEN SATURATION: 97 % | RESPIRATION RATE: 16 BRPM | DIASTOLIC BLOOD PRESSURE: 100 MMHG | SYSTOLIC BLOOD PRESSURE: 155 MMHG

## 2023-02-22 VITALS
HEIGHT: 73 IN | HEART RATE: 89 BPM | DIASTOLIC BLOOD PRESSURE: 102 MMHG | BODY MASS INDEX: 28.18 KG/M2 | OXYGEN SATURATION: 98 % | WEIGHT: 212.6 LBS | TEMPERATURE: 97.1 F | SYSTOLIC BLOOD PRESSURE: 144 MMHG

## 2023-02-22 DIAGNOSIS — Z79.4 TYPE 2 DIABETES MELLITUS WITH OTHER SPECIFIED COMPLICATION, WITH LONG-TERM CURRENT USE OF INSULIN: ICD-10-CM

## 2023-02-22 DIAGNOSIS — J42 CHRONIC BRONCHITIS, UNSPECIFIED CHRONIC BRONCHITIS TYPE: ICD-10-CM

## 2023-02-22 DIAGNOSIS — E11.69 TYPE 2 DIABETES MELLITUS WITH OTHER SPECIFIED COMPLICATION, WITH LONG-TERM CURRENT USE OF INSULIN: ICD-10-CM

## 2023-02-22 DIAGNOSIS — E78.2 MIXED HYPERLIPIDEMIA: ICD-10-CM

## 2023-02-22 DIAGNOSIS — M51.26 DISPLACEMENT OF LUMBAR INTERVERTEBRAL DISC WITHOUT MYELOPATHY: ICD-10-CM

## 2023-02-22 DIAGNOSIS — G58.8 PUDENDAL NEURALGIA: Primary | ICD-10-CM

## 2023-02-22 DIAGNOSIS — Z79.4 TYPE 2 DIABETES MELLITUS WITH OTHER SPECIFIED COMPLICATION, WITH LONG-TERM CURRENT USE OF INSULIN: Primary | ICD-10-CM

## 2023-02-22 DIAGNOSIS — I10 ESSENTIAL HYPERTENSION: ICD-10-CM

## 2023-02-22 DIAGNOSIS — E11.42 DIABETIC POLYNEUROPATHY ASSOCIATED WITH TYPE 2 DIABETES MELLITUS: ICD-10-CM

## 2023-02-22 DIAGNOSIS — E11.69 TYPE 2 DIABETES MELLITUS WITH OTHER SPECIFIED COMPLICATION, WITH LONG-TERM CURRENT USE OF INSULIN: Primary | ICD-10-CM

## 2023-02-22 PROCEDURE — 99214 OFFICE O/P EST MOD 30 MIN: CPT | Performed by: NURSE PRACTITIONER

## 2023-02-22 PROCEDURE — 99214 OFFICE O/P EST MOD 30 MIN: CPT | Performed by: STUDENT IN AN ORGANIZED HEALTH CARE EDUCATION/TRAINING PROGRAM

## 2023-02-22 RX ORDER — LANCETS
1 EACH MISCELLANEOUS 2 TIMES DAILY
Qty: 100 EACH | Refills: 2 | Status: SHIPPED | OUTPATIENT
Start: 2023-02-22

## 2023-02-22 RX ORDER — INSULIN DETEMIR 100 [IU]/ML
20 INJECTION, SOLUTION SUBCUTANEOUS DAILY
Qty: 18 ML | Refills: 2 | Status: SHIPPED | OUTPATIENT
Start: 2023-02-22

## 2023-02-22 RX ORDER — FLURBIPROFEN SODIUM 0.3 MG/ML
1 SOLUTION/ DROPS OPHTHALMIC DAILY
Qty: 100 EACH | Refills: 2 | Status: SHIPPED | OUTPATIENT
Start: 2023-02-22

## 2023-02-22 RX ORDER — ROFLUMILAST 500 UG/1
500 TABLET ORAL DAILY
Qty: 30 TABLET | Refills: 2 | Status: SHIPPED | OUTPATIENT
Start: 2023-02-22

## 2023-02-22 RX ORDER — ALBUTEROL SULFATE 90 UG/1
2 AEROSOL, METERED RESPIRATORY (INHALATION) EVERY 4 HOURS PRN
Qty: 6.7 G | Refills: 2 | Status: SHIPPED | OUTPATIENT
Start: 2023-02-22

## 2023-02-22 RX ORDER — TRAMADOL HYDROCHLORIDE 50 MG/1
50 TABLET ORAL EVERY 6 HOURS PRN
Qty: 120 TABLET | Refills: 0 | Status: SHIPPED | OUTPATIENT
Start: 2023-03-25

## 2023-02-22 RX ORDER — PREGABALIN 200 MG/1
200 CAPSULE ORAL 2 TIMES DAILY
Qty: 60 CAPSULE | Refills: 2 | Status: SHIPPED | OUTPATIENT
Start: 2023-02-22

## 2023-02-22 RX ORDER — SITAGLIPTIN AND METFORMIN HYDROCHLORIDE 1000; 50 MG/1; MG/1
2 TABLET, FILM COATED, EXTENDED RELEASE ORAL DAILY
Qty: 60 TABLET | Refills: 2 | Status: SHIPPED | OUTPATIENT
Start: 2023-02-22

## 2023-02-22 RX ORDER — TRAMADOL HYDROCHLORIDE 50 MG/1
50 TABLET ORAL EVERY 6 HOURS PRN
Qty: 120 TABLET | Refills: 0 | Status: SHIPPED | OUTPATIENT
Start: 2023-02-27

## 2023-02-22 RX ORDER — TRAMADOL HYDROCHLORIDE 50 MG/1
50 TABLET ORAL EVERY 6 HOURS PRN
Qty: 120 TABLET | Refills: 0 | Status: SHIPPED | OUTPATIENT
Start: 2023-04-24

## 2023-02-22 RX ORDER — BUDESONIDE, GLYCOPYRROLATE, AND FORMOTEROL FUMARATE 160; 9; 4.8 UG/1; UG/1; UG/1
2 AEROSOL, METERED RESPIRATORY (INHALATION) 2 TIMES DAILY
Qty: 10.7 G | Refills: 2 | Status: SHIPPED | OUTPATIENT
Start: 2023-02-22

## 2023-02-22 RX ORDER — LOSARTAN POTASSIUM 100 MG/1
100 TABLET ORAL DAILY
Qty: 30 TABLET | Refills: 2 | Status: SHIPPED | OUTPATIENT
Start: 2023-02-22

## 2023-02-22 RX ORDER — ATORVASTATIN CALCIUM 40 MG/1
40 TABLET, FILM COATED ORAL NIGHTLY
Qty: 30 TABLET | Refills: 2 | Status: SHIPPED | OUTPATIENT
Start: 2023-02-22

## 2023-02-22 NOTE — PROGRESS NOTES
"irespChi Complaint  Hypertension, Hyperlipidemia, and Diabetes (Type II)    Subjective        Aj Plaza presents to [facility]  History of Present Illness  Aj is a 47-year-old male presenting today to discuss his meds. Patient had appoinment in December, but had to cancel since he had COVID. Patient's daughter is in the room with him today.    Essential hypertension:  Stable.  Takes losartan 100 mg daily.  Reports he has been running low on his medication for a while and has had to spread out his doses.  Denies chest pain, headache, or lower extremity edema    Type 2 diabetes mellitus:  Stable.  Uses Levemir 20 units daily.  Takes Janumet XR  twice daily  Patient reports being out of Janumet for a couple months.  Reports the pharmacy did give him Levemir and he has been using that.  Last BG was 179.   Reports doing daily foot inspections.    Mixed hyperlipidemia:  Stable.  Takes atorvastatin 40 mg at bedtime.    Chronic bronchitis:  Stable.  Uses Breztri inhaler.  Takes roflumilast 500mg daily.  Reports being out of his albuterol inhaler, but not needing it.  Reports spitting up black sputum every morning.  Denies tobacco use.  Last seen pulmonologist (Dr. Lazar) about a year ago. States he wanted to do a biopsy at that time, but patient did not have anyone to sign for him.       Objective   Vital Signs:  BP (!) 144/102 (BP Location: Left arm, Patient Position: Sitting, Cuff Size: Adult)   Pulse 89   Temp 97.1 °F (36.2 °C) (Temporal)   Ht 185.4 cm (73\")   Wt 96.4 kg (212 lb 9.6 oz)   SpO2 98%   BMI 28.05 kg/m²   Estimated body mass index is 28.05 kg/m² as calculated from the following:    Height as of this encounter: 185.4 cm (73\").    Weight as of this encounter: 96.4 kg (212 lb 9.6 oz).             Review of Systems   Constitutional: Negative for appetite change, chills, fatigue, fever and unexpected weight change.   Respiratory: Positive for cough. " Negative for choking and shortness of breath.    Gastrointestinal: Negative for abdominal distention, abdominal pain, blood in stool, constipation, diarrhea, nausea and vomiting.   Endocrine: Negative for polydipsia and polyuria.   Musculoskeletal: Positive for back pain and myalgias.   Allergic/Immunologic: Negative for food allergies.   Neurological: Negative for dizziness, weakness, light-headedness, numbness and headaches.        Physical Exam  Constitutional:       Appearance: Normal appearance.   Cardiovascular:      Rate and Rhythm: Normal rate and regular rhythm.      Pulses: Normal pulses.      Heart sounds: Normal heart sounds.   Pulmonary:      Effort: Pulmonary effort is normal.      Breath sounds: Normal breath sounds.   Abdominal:      General: Abdomen is flat. Bowel sounds are normal.      Palpations: Abdomen is soft.   Musculoskeletal:         General: Normal range of motion.      Cervical back: Normal range of motion and neck supple.   Skin:     General: Skin is warm and dry.      Capillary Refill: Capillary refill takes less than 2 seconds.   Neurological:      Mental Status: He is alert and oriented to person, place, and time.   Psychiatric:         Mood and Affect: Mood normal.         Behavior: Behavior normal.         Thought Content: Thought content normal.         Judgment: Judgment normal.        Result Review :                   Assessment and Plan   Diagnoses and all orders for this visit:    1. Type 2 diabetes mellitus with other specified complication, with long-term current use of insulin ([facility]) (Primary)  Assessment & Plan:  Patient out of [facility] for couple months, but has been using Levemir.  Check hemoglobin A1c and renal function at 3-month follow-up.  Monitor blood glucose regularly.  New supplies sent to pharmacy.  Return to clinic in 3 months for follow-up.      Orders:  -     Accu-Chek Softclix Lancets lancets; 1 each by Other route 2 (Two) Times a Day. Use to monitor glucose  twice daily. E11.9  Dispense: 100 each; Refill: 2  -     B-D UF III MINI PEN NEEDLES 31G X 5 MM misc; Inject 1 each under the skin into the appropriate area as directed Daily. E11.9  Dispense: 100 each; Refill: 2  -     glucose blood test strip; Use to monitor glucose twice daily. Dx: E11.69  Dispense: 100 each; Refill: 3  -     insulin detemir (Levemir FlexTouch) 100 UNIT/ML injection; Inject 20 Units under the skin into the appropriate area as directed Daily.  Dispense: 18 mL; Refill: 2  -     losartan (COZAAR) 100 MG tablet; Take 1 tablet by mouth Daily.  Dispense: 30 tablet; Refill: 2    2. Mixed hyperlipidemia  Assessment & Plan:  Continue atorvastatin 40 mg nightly.  Check lipid panel at 3-month follow-up.   Discussed adherence to diet consisting of healthy proteins, fruit, vegetables, complex carbohydrates and healthy fats.    Encouraged daily exercise.   Return to clinic in 3 months for follow-up.      Orders:  -     atorvastatin (LIPITOR) 40 MG tablet; Take 1 tablet by mouth Every Night.  Dispense: 30 tablet; Refill: 2    3. Essential hypertension  Assessment & Plan:  Blood pressure not at goal due to patient running out of medication.  Take losartan 100 mg daily.  Encouraged low-sodium diet.  Encourage daily exercise.  Check renal function at 3-month follow-up.      Orders:  -     losartan (COZAAR) 100 MG tablet; Take 1 tablet by mouth Daily.  Dispense: 30 tablet; Refill: 2    4. Chronic bronchitis, unspecified chronic bronchitis type (HCC)  Assessment & Plan:  Continue Breztri inhaler twice daily.  Continue Daliresp 500 mcg daily.  Encouraged to continue weaning smoking cessation.  Follow-up with pulmonology.  Return to clinic in 3 months.      Orders:  -     albuterol sulfate  (90 Base) MCG/ACT inhaler; Inhale 2 puffs Every 4 (Four) Hours As Needed for Wheezing.  Dispense: 6.7 g; Refill: 2  -     Budeson-Glycopyrrol-Formoterol (Breztri Aerosphere) 160-9-4.8 MCG/ACT aerosol inhaler; Inhale 2 puffs  2 (Two) Times a Day. Rinse mouth after each use  Dispense: 10.7 g; Refill: 2  -     Ambulatory Referral to Pulmonology  -     roflumilast (Daliresp) 500 MCG tablet tablet; Take 1 tablet by mouth Daily.  Dispense: 30 tablet; Refill: 2           Follow Up   Return in about 3 months (around 5/22/2023).  Patient was given instructions and counseling regarding his condition or for health maintenance advice. Please see specific information pulled into the AVS if appropriate.

## 2023-02-22 NOTE — ASSESSMENT & PLAN NOTE
Blood pressure not at goal due to patient running out of medication.  Take losartan 100 mg daily.  Encouraged low-sodium diet.  Encourage daily exercise.  Check renal function at 3-month follow-up.

## 2023-02-22 NOTE — PROGRESS NOTES
Patient screened positive for depression based on a PHQ-9 score of 0 on 2/22/2023. Follow-up recommendations include: Elevated PHQ score reflective of acute illness, not depression     CHIEF COMPLAINT  Chief Complaint   Patient presents with   • Neuralgia     F/u from Caudal injection  CC  Diabetic polyneuropathy associated with type 2 diabetes, pudendal neuralgia Treated w/Tramadol 02/22 @ 8:30 am        Primary Care  Devi Sumner APRN Subjective   Aj Plaza is a 47 y.o. male  who presents for generalized pain.  He states that he has had pain in the neck, mid back, low back, knees for many years.  It appears that he was previously being treated at an outside pain clinic approximately 10 years ago with a combination of narcotics however he is no longer at the clinic for unknown reasons.  He states that most recently, he was smoking marijuana for pain control however he has subsequently developed lung issues and can no longer smoke marijuana.  He presents today to establish care.  He describes pain essentially all over his body.  He cannot identify any specific areas of pain which are of more concern than the others.    Back Pain  Associated symptoms include numbness. Pertinent negatives include no weakness.   Pain  Associated symptoms include arthralgias, myalgias, neck pain and numbness. Pertinent negatives include no weakness.   Neck Pain   Associated symptoms include numbness. Pertinent negatives include no weakness.   Knee Pain   Associated symptoms include numbness.        Location: Generalized pain  Onset: Many years ago  Duration: Progressively worsening  Timing: Constant throughout the day  Quality: Numbness and tingling in the hands with sharp, stabbing pains in the legs and feet  Severity: Today: 5       Last Week: 6       Worst: 8  Modifying Factors: The pain is fairly constant without any exacerbating or relieving factors    Physical Therapy: no    Interval Update 02/22/2023: He again reports  good benefit with his caudal epidural regarding his pudendal neuralgia as well as low back pain.    The following portions of the patient's history were reviewed and updated as appropriate: allergies, current medications, past family history, past medical history, past social history, past surgical history and problem list.      Current Outpatient Medications:   •  Accu-Chek Softclix Lancets lancets, 1 each by Other route 2 (Two) Times a Day. Use to monitor glucose twice daily. E11.9, Disp: 100 each, Rfl: 2  •  albuterol sulfate  (90 Base) MCG/ACT inhaler, Inhale 2 puffs Every 4 (Four) Hours As Needed for Wheezing., Disp: 6.7 g, Rfl: 2  •  atorvastatin (LIPITOR) 40 MG tablet, Take 1 tablet by mouth Every Night., Disp: 30 tablet, Rfl: 2  •  B-D UF III MINI PEN NEEDLES 31G X 5 MM misc, Inject 1 each under the skin into the appropriate area as directed Daily. E11.9, Disp: 100 each, Rfl: 2  •  Budeson-Glycopyrrol-Formoterol (Breztri Aerosphere) 160-9-4.8 MCG/ACT aerosol inhaler, Inhale 2 puffs 2 (Two) Times a Day. Rinse mouth after each use, Disp: 10.7 g, Rfl: 2  •  EPINEPHrine (EPIPEN) 0.3 MG/0.3ML solution auto-injector injection, , Disp: , Rfl:   •  glucose blood test strip, Use to monitor glucose twice daily. Dx: E11.69, Disp: 100 each, Rfl: 3  •  glucose monitor monitoring kit, Use to monitor glucose twice a day. Dx: E11.69, Disp: 1 each, Rfl: 0  •  insulin detemir (Levemir FlexTouch) 100 UNIT/ML injection, Inject 20 Units under the skin into the appropriate area as directed Daily., Disp: 18 mL, Rfl: 2  •  losartan (COZAAR) 100 MG tablet, Take 1 tablet by mouth Daily., Disp: 30 tablet, Rfl: 2  •  pregabalin (LYRICA) 200 MG capsule, Take 1 capsule by mouth 2 (Two) Times a Day., Disp: 60 capsule, Rfl: 2  •  roflumilast (Daliresp) 500 MCG tablet tablet, Take 1 tablet by mouth Daily., Disp: 30 tablet, Rfl: 2  •  [START ON 2/27/2023] traMADol (ULTRAM) 50 MG tablet, Take 1 tablet by mouth Every 6 (Six) Hours As  Needed for Moderate Pain., Disp: 120 tablet, Rfl: 0  •  SITagliptin-metFORMIN HCl ER (Janumet XR)  MG tablet, Take 2 tablets by mouth Daily., Disp: 60 tablet, Rfl: 2  •  [START ON 3/25/2023] traMADol (ULTRAM) 50 MG tablet, Take 1 tablet by mouth Every 6 (Six) Hours As Needed for Moderate Pain., Disp: 120 tablet, Rfl: 0  •  [START ON 4/24/2023] traMADol (ULTRAM) 50 MG tablet, Take 1 tablet by mouth Every 6 (Six) Hours As Needed for Moderate Pain., Disp: 120 tablet, Rfl: 0    Review of Systems   Musculoskeletal: Positive for arthralgias, back pain, myalgias and neck pain. Negative for gait problem and neck stiffness.   Neurological: Positive for numbness. Negative for weakness.       Vitals:    02/22/23 1425   BP: 155/100   Pulse: 103   Resp: 16   SpO2: 97%   PainSc:   5       Urine Drug Screen: 3/16/2022  Appropriate: Positive for marijuana    Objective   Physical Exam  Vitals and nursing note reviewed. Exam conducted with a chaperone present.   Constitutional:       General: He is not in acute distress.     Appearance: Normal appearance. He is normal weight.   Neurological:      General: No focal deficit present.      Mental Status: He is alert. Mental status is at baseline.      Sensory: No sensory deficit.      Motor: No weakness.           Assessment & Plan   Problems Addressed this Visit    None  Visit Diagnoses     Pudendal neuralgia    -  Primary    Diabetic polyneuropathy associated with type 2 diabetes mellitus (HCC)        Relevant Medications    traMADol (ULTRAM) 50 MG tablet (Start on 2/27/2023)    traMADol (ULTRAM) 50 MG tablet (Start on 3/25/2023)    traMADol (ULTRAM) 50 MG tablet (Start on 4/24/2023)    pregabalin (LYRICA) 200 MG capsule    Displacement of lumbar intervertebral disc without myelopathy          Diagnoses       Codes Comments    Pudendal neuralgia    -  Primary ICD-10-CM: G58.8  ICD-9-CM: 355.8     Diabetic polyneuropathy associated with type 2 diabetes mellitus (HCC)      ICD-10-CM: E11.42  ICD-9-CM: 250.60, 357.2     Displacement of lumbar intervertebral disc without myelopathy     ICD-10-CM: M51.26  ICD-9-CM: 722.10           Plan:  1. Continue tramadol 50 mg 4 times daily  2. Increase Lyrica to 200 mg twice daily  3. Inspect appropriate.  Did have trace THC on UDS  --- Follow-up 3 months           INSPECT REPORT    As part of the patient's treatment plan, I may be prescribing controlled substances. The patient has been made aware of appropriate use of such medications, including potential risk of somnolence, limited ability to drive and/or work safely, and the potential for dependence or overdose. It has also bee made clear that these medications are for use by this patient only, without concomitant use of alcohol or other substances unless prescribed.     Patient has completed prescribing agreement detailing terms of continued prescribing of controlled substances, including monitoring JAI reports, urine drug screening, and pill counts if necessary. The patient is aware that inappropriate use will results in cessation of prescribing such medications.    INSPECT report has been reviewed and scanned into the patient's chart.    As the clinician, I personally reviewed the INSPECT from 2/21/2023.    History and physical exam exhibit continued safe and appropriate use of controlled substances.      EMR Dragon/Transcription disclaimer:   Much of this encounter note is an electronic transcription/translation of spoken language to printed text. The electronic translation of spoken language may permit erroneous, or at times, nonsensical words or phrases to be inadvertently transcribed; Although I have reviewed the note for such errors, some may still exist.       .

## 2023-02-22 NOTE — ASSESSMENT & PLAN NOTE
Continue atorvastatin 40 mg nightly.  Check lipid panel at 3-month follow-up.   Discussed adherence to diet consisting of healthy proteins, fruit, vegetables, complex carbohydrates and healthy fats.    Encouraged daily exercise.   Return to clinic in 3 months for follow-up.

## 2023-02-22 NOTE — TELEPHONE ENCOUNTER
Caller: Aj Plaza    Relationship: Self    Best call back number: [phone]    What is the best time to reach you: ANY TIME    Who are you requesting to speak with (clinical staff, provider,  specific staff member): CLINICAL STAFF    What was the call regarding: PATIENT STATES A 7 DAY SUPPLY OF JANUMET WAS CALLED IN AND HE IS NEEDING 90 DAY SUPPLY SENT TO [facility] ON [id number] - [facility] [address]  AT [facility] [phone]  - [phone]   [phone]    PLEASE ADVISE WHEN SENT    Do you require a callback: YES

## 2023-02-22 NOTE — ASSESSMENT & PLAN NOTE
[facility] but has been using Leve[facility].  Check hemoglobin A1c and renal function at 3-month follow-up.  Monitor blood glucose regularly.  New supplies sent to pharmacy.  Return to clinic in 3 months for follow-up.

## 2023-02-22 NOTE — ASSESSMENT & PLAN NOTE
Continue Breztri inhaler twice daily.  Continue Daliresp 500 mcg daily.  Encouraged to continue weaning smoking cessation.  Follow-up with pulmonology.  Return to clinic in 3 months.

## 2023-04-18 DIAGNOSIS — E11.69 TYPE 2 DIABETES MELLITUS WITH OTHER SPECIFIED COMPLICATION, WITH LONG-TERM CURRENT USE OF INSULIN: ICD-10-CM

## 2023-04-18 DIAGNOSIS — Z79.4 TYPE 2 DIABETES MELLITUS WITH OTHER SPECIFIED COMPLICATION, WITH LONG-TERM CURRENT USE OF INSULIN: ICD-10-CM

## 2023-04-19 RX ORDER — SITAGLIPTIN AND METFORMIN HYDROCHLORIDE 1000; 50 MG/1; MG/1
2 TABLET, FILM COATED, EXTENDED RELEASE ORAL DAILY
Qty: 60 TABLET | Refills: 2 | Status: SHIPPED | OUTPATIENT
Start: 2023-04-19

## 2023-05-03 ENCOUNTER — HOSPITAL ENCOUNTER (OUTPATIENT)
Facility: HOSPITAL | Age: 48
Setting detail: OBSERVATION
Discharge: HOME | End: 2023-05-05
Attending: EMERGENCY MEDICINE | Admitting: HOSPITALIST
Payer: MEDICARE

## 2023-05-03 DIAGNOSIS — K42.9 UMBILICAL HERNIA WITHOUT OBSTRUCTION AND WITHOUT GANGRENE: ICD-10-CM

## 2023-05-03 DIAGNOSIS — R11.2 NAUSEA AND VOMITING, UNSPECIFIED VOMITING TYPE: ICD-10-CM

## 2023-05-03 DIAGNOSIS — I10 HYPERTENSION, UNSPECIFIED TYPE: ICD-10-CM

## 2023-05-03 DIAGNOSIS — R10.84 GENERALIZED ABDOMINAL PAIN: Primary | ICD-10-CM

## 2023-05-03 LAB
ACETONE BLD QL: NEGATIVE
ALBUMIN SERPL-MCNC: 5.3 G/DL (ref 3.5–5.2)
ALBUMIN/GLOB SERPL: 1.8 G/DL
ALP SERPL-CCNC: 86 U/L (ref 39–117)
ALT SERPL W P-5'-P-CCNC: 23 U/L (ref 1–41)
ANION GAP SERPL CALCULATED.3IONS-SCNC: 18 MMOL/L (ref 5–15)
AST SERPL-CCNC: 22 U/L (ref 1–40)
ATMOSPHERIC PRESS: ABNORMAL MM[HG]
BACTERIA UR QL AUTO: ABNORMAL /HPF
BASE EXCESS BLDV CALC-SCNC: 0.4 MMOL/L (ref -2–2)
BASOPHILS # BLD AUTO: 0.1 10*3/MM3 (ref 0–0.2)
BASOPHILS NFR BLD AUTO: 0.5 % (ref 0–1.5)
BDY SITE: ABNORMAL
BILIRUB SERPL-MCNC: 0.7 MG/DL (ref 0–1.2)
BILIRUB UR QL STRIP: NEGATIVE
BUN SERPL-MCNC: 17 MG/DL (ref 6–20)
BUN/CREAT SERPL: 21 (ref 7–25)
CALCIUM SPEC-SCNC: 10.4 MG/DL (ref 8.6–10.5)
CHLORIDE SERPL-SCNC: 102 MMOL/L (ref 98–107)
CLARITY UR: CLEAR
CO2 BLDA-SCNC: 22 MMOL/L (ref 22–29)
CO2 SERPL-SCNC: 21 MMOL/L (ref 22–29)
COLOR UR: ABNORMAL
CREAT SERPL-MCNC: 0.81 MG/DL (ref 0.76–1.27)
D-LACTATE SERPL-SCNC: 2.4 MMOL/L (ref 0.3–2)
DEPRECATED RDW RBC AUTO: 40.3 FL (ref 37–54)
EGFRCR SERPLBLD CKD-EPI 2021: 108.1 ML/MIN/1.73
EOSINOPHIL # BLD AUTO: 0 10*3/MM3 (ref 0–0.4)
EOSINOPHIL NFR BLD AUTO: 0.2 % (ref 0.3–6.2)
ERYTHROCYTE [DISTWIDTH] IN BLOOD BY AUTOMATED COUNT: 13.1 % (ref 12.3–15.4)
GLOBULIN UR ELPH-MCNC: 3 GM/DL
GLUCOSE BLDC GLUCOMTR-MCNC: 163 MG/DL (ref 70–105)
GLUCOSE SERPL-MCNC: 166 MG/DL (ref 65–99)
GLUCOSE UR STRIP-MCNC: NEGATIVE MG/DL
HCO3 BLDV-SCNC: 21.2 MMOL/L (ref 22–26)
HCT VFR BLD AUTO: 48.9 % (ref 37.5–51)
HGB BLD-MCNC: 16.9 G/DL (ref 13–17.7)
HGB UR QL STRIP.AUTO: NEGATIVE
HOLD SPECIMEN: NORMAL
HYALINE CASTS UR QL AUTO: ABNORMAL /LPF
KETONES UR QL STRIP: ABNORMAL
LEUKOCYTE ESTERASE UR QL STRIP.AUTO: ABNORMAL
LIPASE SERPL-CCNC: 48 U/L (ref 13–60)
LYMPHOCYTES # BLD AUTO: 1.9 10*3/MM3 (ref 0.7–3.1)
LYMPHOCYTES NFR BLD AUTO: 11.6 % (ref 19.6–45.3)
MAGNESIUM SERPL-MCNC: 1.7 MG/DL (ref 1.6–2.6)
MCH RBC QN AUTO: 30.3 PG (ref 26.6–33)
MCHC RBC AUTO-ENTMCNC: 34.5 G/DL (ref 31.5–35.7)
MCV RBC AUTO: 87.8 FL (ref 79–97)
MODALITY: ABNORMAL
MONOCYTES # BLD AUTO: 0.5 10*3/MM3 (ref 0.1–0.9)
MONOCYTES NFR BLD AUTO: 2.9 % (ref 5–12)
NEUTROPHILS NFR BLD AUTO: 13.7 10*3/MM3 (ref 1.7–7)
NEUTROPHILS NFR BLD AUTO: 84.8 % (ref 42.7–76)
NITRITE UR QL STRIP: NEGATIVE
NRBC BLD AUTO-RTO: 0.1 /100 WBC (ref 0–0.2)
PCO2 BLDV: 25.5 MM HG (ref 42–51)
PH BLDV: 7.53 PH UNITS (ref 7.32–7.43)
PH UR STRIP.AUTO: 8.5 [PH] (ref 5–8)
PLATELET # BLD AUTO: 246 10*3/MM3 (ref 140–450)
PMV BLD AUTO: 8.1 FL (ref 6–12)
PO2 BLDV: 34.1 MM HG (ref 40–42)
POTASSIUM SERPL-SCNC: 4 MMOL/L (ref 3.5–5.2)
PROT SERPL-MCNC: 8.3 G/DL (ref 6–8.5)
PROT UR QL STRIP: ABNORMAL
RBC # BLD AUTO: 5.56 10*6/MM3 (ref 4.14–5.8)
RBC # UR STRIP: ABNORMAL /HPF
REF LAB TEST METHOD: ABNORMAL
SAO2 % BLDCOV: 74.7 % (ref 45–75)
SODIUM SERPL-SCNC: 141 MMOL/L (ref 136–145)
SP GR UR STRIP: 1.03 (ref 1–1.03)
SQUAMOUS #/AREA URNS HPF: ABNORMAL /HPF
UROBILINOGEN UR QL STRIP: ABNORMAL
WBC # UR STRIP: ABNORMAL /HPF
WBC NRBC COR # BLD: 16.2 10*3/MM3 (ref 3.4–10.8)
WHOLE BLOOD HOLD COAG: NORMAL

## 2023-05-03 PROCEDURE — 85025 COMPLETE CBC W/AUTO DIFF WBC: CPT

## 2023-05-03 PROCEDURE — 82803 BLOOD GASES ANY COMBINATION: CPT

## 2023-05-03 PROCEDURE — 25010000002 ONDANSETRON PER 1 MG: Performed by: PHYSICIAN ASSISTANT

## 2023-05-03 PROCEDURE — 81001 URINALYSIS AUTO W/SCOPE: CPT

## 2023-05-03 PROCEDURE — 25010000002 MORPHINE PER 10 MG: Performed by: PHYSICIAN ASSISTANT

## 2023-05-03 PROCEDURE — 25010000002 PROCHLORPERAZINE 10 MG/2ML SOLUTION: Performed by: PHYSICIAN ASSISTANT

## 2023-05-03 PROCEDURE — 83735 ASSAY OF MAGNESIUM: CPT

## 2023-05-03 PROCEDURE — 83690 ASSAY OF LIPASE: CPT

## 2023-05-03 PROCEDURE — 96375 TX/PRO/DX INJ NEW DRUG ADDON: CPT

## 2023-05-03 PROCEDURE — 25010000002 DIPHENHYDRAMINE PER 50 MG: Performed by: PHYSICIAN ASSISTANT

## 2023-05-03 PROCEDURE — 82009 KETONE BODYS QUAL: CPT

## 2023-05-03 PROCEDURE — 82948 REAGENT STRIP/BLOOD GLUCOSE: CPT

## 2023-05-03 PROCEDURE — 99285 EMERGENCY DEPT VISIT HI MDM: CPT

## 2023-05-03 PROCEDURE — 25010000002 HYDRALAZINE PER 20 MG: Performed by: PHYSICIAN ASSISTANT

## 2023-05-03 PROCEDURE — 83605 ASSAY OF LACTIC ACID: CPT

## 2023-05-03 PROCEDURE — 80053 COMPREHEN METABOLIC PANEL: CPT

## 2023-05-03 PROCEDURE — 96374 THER/PROPH/DIAG INJ IV PUSH: CPT

## 2023-05-03 RX ORDER — PROCHLORPERAZINE EDISYLATE 5 MG/ML
10 INJECTION INTRAMUSCULAR; INTRAVENOUS ONCE
Status: COMPLETED | OUTPATIENT
Start: 2023-05-03 | End: 2023-05-03

## 2023-05-03 RX ORDER — SODIUM CHLORIDE 0.9 % (FLUSH) 0.9 %
10 SYRINGE (ML) INJECTION AS NEEDED
Status: DISCONTINUED | OUTPATIENT
Start: 2023-05-03 | End: 2023-05-05 | Stop reason: HOSPADM

## 2023-05-03 RX ORDER — HYDRALAZINE HYDROCHLORIDE 20 MG/ML
10 INJECTION INTRAMUSCULAR; INTRAVENOUS ONCE
Status: COMPLETED | OUTPATIENT
Start: 2023-05-03 | End: 2023-05-03

## 2023-05-03 RX ORDER — DIPHENHYDRAMINE HYDROCHLORIDE 50 MG/ML
25 INJECTION INTRAMUSCULAR; INTRAVENOUS ONCE
Status: COMPLETED | OUTPATIENT
Start: 2023-05-03 | End: 2023-05-03

## 2023-05-03 RX ORDER — ONDANSETRON 2 MG/ML
4 INJECTION INTRAMUSCULAR; INTRAVENOUS ONCE
Status: COMPLETED | OUTPATIENT
Start: 2023-05-03 | End: 2023-05-03

## 2023-05-03 RX ADMIN — MORPHINE SULFATE 4 MG: 4 INJECTION, SOLUTION INTRAMUSCULAR; INTRAVENOUS at 22:17

## 2023-05-03 RX ADMIN — PROCHLORPERAZINE EDISYLATE 10 MG: 5 INJECTION INTRAMUSCULAR; INTRAVENOUS at 23:06

## 2023-05-03 RX ADMIN — SODIUM CHLORIDE 1000 ML: 9 INJECTION, SOLUTION INTRAVENOUS at 22:16

## 2023-05-03 RX ADMIN — DIPHENHYDRAMINE HYDROCHLORIDE 25 MG: 50 INJECTION, SOLUTION INTRAMUSCULAR; INTRAVENOUS at 23:06

## 2023-05-03 RX ADMIN — ONDANSETRON 4 MG: 2 INJECTION INTRAMUSCULAR; INTRAVENOUS at 22:17

## 2023-05-03 RX ADMIN — HYDRALAZINE HYDROCHLORIDE 10 MG: 20 INJECTION INTRAMUSCULAR; INTRAVENOUS at 23:53

## 2023-05-04 ENCOUNTER — APPOINTMENT (OUTPATIENT)
Dept: CT IMAGING | Facility: HOSPITAL | Age: 48
End: 2023-05-04
Payer: MEDICARE

## 2023-05-04 ENCOUNTER — INPATIENT HOSPITAL (OUTPATIENT)
Dept: URBAN - METROPOLITAN AREA HOSPITAL 84 | Facility: HOSPITAL | Age: 48
End: 2023-05-04

## 2023-05-04 DIAGNOSIS — R11.2 NAUSEA WITH VOMITING, UNSPECIFIED: ICD-10-CM

## 2023-05-04 DIAGNOSIS — R10.9 UNSPECIFIED ABDOMINAL PAIN: ICD-10-CM

## 2023-05-04 PROBLEM — R10.84 GENERALIZED ABDOMINAL PAIN: Status: ACTIVE | Noted: 2023-05-04

## 2023-05-04 LAB
AMPHET+METHAMPHET UR QL: NEGATIVE
BARBITURATES UR QL SCN: NEGATIVE
BASOPHILS # BLD AUTO: 0.1 10*3/MM3 (ref 0–0.2)
BASOPHILS NFR BLD AUTO: 0.6 % (ref 0–1.5)
BENZODIAZ UR QL SCN: NEGATIVE
CANNABINOIDS SERPL QL: POSITIVE
COCAINE UR QL: NEGATIVE
D-LACTATE SERPL-SCNC: 2.7 MMOL/L (ref 0.5–2)
DEPRECATED RDW RBC AUTO: 41.6 FL (ref 37–54)
EOSINOPHIL # BLD AUTO: 0 10*3/MM3 (ref 0–0.4)
EOSINOPHIL NFR BLD AUTO: 0 % (ref 0.3–6.2)
ERYTHROCYTE [DISTWIDTH] IN BLOOD BY AUTOMATED COUNT: 12.9 % (ref 12.3–15.4)
ETHANOL UR QL: <0.01 %
GLUCOSE BLDC GLUCOMTR-MCNC: 149 MG/DL (ref 70–105)
GLUCOSE BLDC GLUCOMTR-MCNC: 159 MG/DL (ref 70–105)
GLUCOSE BLDC GLUCOMTR-MCNC: 166 MG/DL (ref 70–105)
GLUCOSE BLDC GLUCOMTR-MCNC: 171 MG/DL (ref 70–105)
HBA1C MFR BLD: 7.2 % (ref 4.8–5.6)
HCT VFR BLD AUTO: 34.8 % (ref 37.5–51)
HGB BLD-MCNC: 11.9 G/DL (ref 13–17.7)
LIPASE SERPL-CCNC: 26 U/L (ref 13–60)
LYMPHOCYTES # BLD AUTO: 1.4 10*3/MM3 (ref 0.7–3.1)
LYMPHOCYTES NFR BLD AUTO: 9.8 % (ref 19.6–45.3)
MCH RBC QN AUTO: 30.2 PG (ref 26.6–33)
MCHC RBC AUTO-ENTMCNC: 34.2 G/DL (ref 31.5–35.7)
MCV RBC AUTO: 88.2 FL (ref 79–97)
METHADONE UR QL SCN: NEGATIVE
MONOCYTES # BLD AUTO: 0.5 10*3/MM3 (ref 0.1–0.9)
MONOCYTES NFR BLD AUTO: 3.3 % (ref 5–12)
NEUTROPHILS NFR BLD AUTO: 12.2 10*3/MM3 (ref 1.7–7)
NEUTROPHILS NFR BLD AUTO: 86.3 % (ref 42.7–76)
NRBC BLD AUTO-RTO: 0.1 /100 WBC (ref 0–0.2)
OPIATES UR QL: POSITIVE
OXYCODONE UR QL SCN: NEGATIVE
PLATELET # BLD AUTO: 166 10*3/MM3 (ref 140–450)
PMV BLD AUTO: 8 FL (ref 6–12)
RBC # BLD AUTO: 3.95 10*6/MM3 (ref 4.14–5.8)
WBC NRBC COR # BLD: 14.1 10*3/MM3 (ref 3.4–10.8)

## 2023-05-04 PROCEDURE — 99222 1ST HOSP IP/OBS MODERATE 55: CPT | Mod: FS | Performed by: NURSE PRACTITIONER

## 2023-05-04 PROCEDURE — 80307 DRUG TEST PRSMV CHEM ANLYZR: CPT | Performed by: PHYSICIAN ASSISTANT

## 2023-05-04 PROCEDURE — 94640 AIRWAY INHALATION TREATMENT: CPT

## 2023-05-04 PROCEDURE — 96376 TX/PRO/DX INJ SAME DRUG ADON: CPT

## 2023-05-04 PROCEDURE — 85025 COMPLETE CBC W/AUTO DIFF WBC: CPT | Performed by: NURSE PRACTITIONER

## 2023-05-04 PROCEDURE — 63710000001 INSULIN GLARGINE PER 5 UNITS: Performed by: HOSPITALIST

## 2023-05-04 PROCEDURE — 74177 CT ABD & PELVIS W/CONTRAST: CPT

## 2023-05-04 PROCEDURE — 25010000002 METOCLOPRAMIDE PER 10 MG: Performed by: NURSE PRACTITIONER

## 2023-05-04 PROCEDURE — 96375 TX/PRO/DX INJ NEW DRUG ADDON: CPT

## 2023-05-04 PROCEDURE — G0378 HOSPITAL OBSERVATION PER HR: HCPCS

## 2023-05-04 PROCEDURE — 85025 COMPLETE CBC W/AUTO DIFF WBC: CPT | Performed by: HOSPITALIST

## 2023-05-04 PROCEDURE — 82948 REAGENT STRIP/BLOOD GLUCOSE: CPT

## 2023-05-04 PROCEDURE — 80053 COMPREHEN METABOLIC PANEL: CPT | Performed by: NURSE PRACTITIONER

## 2023-05-04 PROCEDURE — 83036 HEMOGLOBIN GLYCOSYLATED A1C: CPT | Performed by: INTERNAL MEDICINE

## 2023-05-04 PROCEDURE — 83605 ASSAY OF LACTIC ACID: CPT | Performed by: INTERNAL MEDICINE

## 2023-05-04 PROCEDURE — 83690 ASSAY OF LIPASE: CPT | Performed by: NURSE PRACTITIONER

## 2023-05-04 PROCEDURE — 63710000001 INSULIN GLARGINE PER 5 UNITS: Performed by: INTERNAL MEDICINE

## 2023-05-04 PROCEDURE — 25010000002 HYDROMORPHONE 1 MG/ML SOLUTION: Performed by: PHYSICIAN ASSISTANT

## 2023-05-04 PROCEDURE — 94799 UNLISTED PULMONARY SVC/PX: CPT

## 2023-05-04 PROCEDURE — 83605 ASSAY OF LACTIC ACID: CPT

## 2023-05-04 PROCEDURE — 25010000002 ONDANSETRON PER 1 MG: Performed by: NURSE PRACTITIONER

## 2023-05-04 PROCEDURE — 82077 ASSAY SPEC XCP UR&BREATH IA: CPT | Performed by: PHYSICIAN ASSISTANT

## 2023-05-04 PROCEDURE — 25510000001 IOPAMIDOL PER 1 ML: Performed by: EMERGENCY MEDICINE

## 2023-05-04 RX ORDER — SODIUM CHLORIDE 0.9 % (FLUSH) 0.9 %
10 SYRINGE (ML) INJECTION EVERY 12 HOURS SCHEDULED
Status: DISCONTINUED | OUTPATIENT
Start: 2023-05-04 | End: 2023-05-05 | Stop reason: HOSPADM

## 2023-05-04 RX ORDER — SODIUM CHLORIDE 9 MG/ML
40 INJECTION, SOLUTION INTRAVENOUS AS NEEDED
Status: DISCONTINUED | OUTPATIENT
Start: 2023-05-04 | End: 2023-05-05 | Stop reason: HOSPADM

## 2023-05-04 RX ORDER — INSULIN LISPRO 100 [IU]/ML
1-200 INJECTION, SOLUTION INTRAVENOUS; SUBCUTANEOUS AS NEEDED
Status: DISCONTINUED | OUTPATIENT
Start: 2023-05-04 | End: 2023-05-05 | Stop reason: HOSPADM

## 2023-05-04 RX ORDER — LABETALOL HYDROCHLORIDE 5 MG/ML
10 INJECTION, SOLUTION INTRAVENOUS ONCE
Status: COMPLETED | OUTPATIENT
Start: 2023-05-04 | End: 2023-05-04

## 2023-05-04 RX ORDER — TRAMADOL HYDROCHLORIDE 50 MG/1
50 TABLET ORAL EVERY 6 HOURS PRN
Status: DISCONTINUED | OUTPATIENT
Start: 2023-05-04 | End: 2023-05-05 | Stop reason: HOSPADM

## 2023-05-04 RX ORDER — DEXTROSE MONOHYDRATE 25 G/50ML
10-50 INJECTION, SOLUTION INTRAVENOUS
Status: DISCONTINUED | OUTPATIENT
Start: 2023-05-04 | End: 2023-05-05 | Stop reason: HOSPADM

## 2023-05-04 RX ORDER — METOCLOPRAMIDE HYDROCHLORIDE 5 MG/ML
10 INJECTION INTRAMUSCULAR; INTRAVENOUS EVERY 6 HOURS
Status: DISCONTINUED | OUTPATIENT
Start: 2023-05-04 | End: 2023-05-05

## 2023-05-04 RX ORDER — BUDESONIDE AND FORMOTEROL FUMARATE DIHYDRATE 160; 4.5 UG/1; UG/1
2 AEROSOL RESPIRATORY (INHALATION)
Status: DISCONTINUED | OUTPATIENT
Start: 2023-05-04 | End: 2023-05-05 | Stop reason: HOSPADM

## 2023-05-04 RX ORDER — SODIUM CHLORIDE 0.9 % (FLUSH) 0.9 %
10 SYRINGE (ML) INJECTION AS NEEDED
Status: DISCONTINUED | OUTPATIENT
Start: 2023-05-04 | End: 2023-05-05 | Stop reason: HOSPADM

## 2023-05-04 RX ORDER — IBUPROFEN 600 MG/1
1 TABLET ORAL
Status: DISCONTINUED | OUTPATIENT
Start: 2023-05-04 | End: 2023-05-05 | Stop reason: HOSPADM

## 2023-05-04 RX ORDER — AMITRIPTYLINE HYDROCHLORIDE 25 MG/1
25 TABLET, FILM COATED ORAL NIGHTLY
Status: DISCONTINUED | OUTPATIENT
Start: 2023-05-04 | End: 2023-05-05 | Stop reason: HOSPADM

## 2023-05-04 RX ORDER — INSULIN LISPRO 100 [IU]/ML
1-200 INJECTION, SOLUTION INTRAVENOUS; SUBCUTANEOUS
Status: DISCONTINUED | OUTPATIENT
Start: 2023-05-04 | End: 2023-05-05 | Stop reason: HOSPADM

## 2023-05-04 RX ORDER — ROFLUMILAST 500 UG/1
500 TABLET ORAL DAILY
Status: DISCONTINUED | OUTPATIENT
Start: 2023-05-04 | End: 2023-05-05 | Stop reason: HOSPADM

## 2023-05-04 RX ORDER — PANTOPRAZOLE SODIUM 40 MG/1
40 TABLET, DELAYED RELEASE ORAL
Status: DISCONTINUED | OUTPATIENT
Start: 2023-05-05 | End: 2023-05-05

## 2023-05-04 RX ORDER — ONDANSETRON 2 MG/ML
4 INJECTION INTRAMUSCULAR; INTRAVENOUS EVERY 6 HOURS
Status: DISCONTINUED | OUTPATIENT
Start: 2023-05-04 | End: 2023-05-05 | Stop reason: HOSPADM

## 2023-05-04 RX ORDER — LOSARTAN POTASSIUM 50 MG/1
100 TABLET ORAL
Status: DISCONTINUED | OUTPATIENT
Start: 2023-05-04 | End: 2023-05-05 | Stop reason: HOSPADM

## 2023-05-04 RX ORDER — ATORVASTATIN CALCIUM 40 MG/1
40 TABLET, FILM COATED ORAL NIGHTLY
Status: DISCONTINUED | OUTPATIENT
Start: 2023-05-04 | End: 2023-05-05 | Stop reason: HOSPADM

## 2023-05-04 RX ORDER — NICOTINE POLACRILEX 4 MG
15 LOZENGE BUCCAL
Status: DISCONTINUED | OUTPATIENT
Start: 2023-05-04 | End: 2023-05-05 | Stop reason: HOSPADM

## 2023-05-04 RX ORDER — AMLODIPINE BESYLATE 5 MG/1
10 TABLET ORAL
Status: DISCONTINUED | OUTPATIENT
Start: 2023-05-04 | End: 2023-05-05 | Stop reason: HOSPADM

## 2023-05-04 RX ORDER — HYDRALAZINE HYDROCHLORIDE 20 MG/ML
10 INJECTION INTRAMUSCULAR; INTRAVENOUS EVERY 6 HOURS PRN
Status: DISCONTINUED | OUTPATIENT
Start: 2023-05-04 | End: 2023-05-05 | Stop reason: HOSPADM

## 2023-05-04 RX ORDER — PREGABALIN 100 MG/1
200 CAPSULE ORAL 2 TIMES DAILY
Status: DISCONTINUED | OUTPATIENT
Start: 2023-05-04 | End: 2023-05-05 | Stop reason: HOSPADM

## 2023-05-04 RX ADMIN — BUDESONIDE AND FORMOTEROL FUMARATE DIHYDRATE 2 PUFF: 160; 4.5 AEROSOL RESPIRATORY (INHALATION) at 21:13

## 2023-05-04 RX ADMIN — ONDANSETRON 4 MG: 2 INJECTION INTRAMUSCULAR; INTRAVENOUS at 17:17

## 2023-05-04 RX ADMIN — LOSARTAN POTASSIUM 100 MG: 50 TABLET, FILM COATED ORAL at 02:05

## 2023-05-04 RX ADMIN — Medication 10 ML: at 10:06

## 2023-05-04 RX ADMIN — AMITRIPTYLINE HYDROCHLORIDE 25 MG: 25 TABLET, FILM COATED ORAL at 20:26

## 2023-05-04 RX ADMIN — PREGABALIN 200 MG: 100 CAPSULE ORAL at 20:26

## 2023-05-04 RX ADMIN — METOCLOPRAMIDE HYDROCHLORIDE 10 MG: 5 INJECTION INTRAMUSCULAR; INTRAVENOUS at 15:17

## 2023-05-04 RX ADMIN — TRAMADOL HYDROCHLORIDE 50 MG: 50 TABLET, COATED ORAL at 14:19

## 2023-05-04 RX ADMIN — TRAMADOL HYDROCHLORIDE 50 MG: 50 TABLET, COATED ORAL at 20:44

## 2023-05-04 RX ADMIN — LABETALOL HYDROCHLORIDE 10 MG: 5 INJECTION, SOLUTION INTRAVENOUS at 02:02

## 2023-05-04 RX ADMIN — HYDROMORPHONE HYDROCHLORIDE 0.5 MG: 1 INJECTION, SOLUTION INTRAMUSCULAR; INTRAVENOUS; SUBCUTANEOUS at 02:02

## 2023-05-04 RX ADMIN — INSULIN GLARGINE 24 UNITS: 100 INJECTION, SOLUTION SUBCUTANEOUS at 20:44

## 2023-05-04 RX ADMIN — ROFLUMILAST 500 MCG: 500 TABLET ORAL at 10:06

## 2023-05-04 RX ADMIN — INSULIN GLARGINE 20 UNITS: 100 INJECTION, SOLUTION SUBCUTANEOUS at 10:06

## 2023-05-04 RX ADMIN — IOPAMIDOL 100 ML: 755 INJECTION, SOLUTION INTRAVENOUS at 00:49

## 2023-05-04 RX ADMIN — METOCLOPRAMIDE HYDROCHLORIDE 10 MG: 5 INJECTION INTRAMUSCULAR; INTRAVENOUS at 20:26

## 2023-05-04 RX ADMIN — TRAMADOL HYDROCHLORIDE 50 MG: 50 TABLET, COATED ORAL at 06:30

## 2023-05-04 RX ADMIN — Medication 10 ML: at 20:26

## 2023-05-04 RX ADMIN — AMLODIPINE BESYLATE 10 MG: 5 TABLET ORAL at 17:17

## 2023-05-04 RX ADMIN — SODIUM CHLORIDE 1000 ML: 0.9 INJECTION, SOLUTION INTRAVENOUS at 03:15

## 2023-05-04 RX ADMIN — ATORVASTATIN CALCIUM 40 MG: 40 TABLET, FILM COATED ORAL at 20:26

## 2023-05-04 RX ADMIN — PREGABALIN 200 MG: 100 CAPSULE ORAL at 10:06

## 2023-05-04 NOTE — PLAN OF CARE
Goal Outcome Evaluation:              Outcome Evaluation: Patient pleasant. Admitted from ER. Head-to-toe assessment completed.

## 2023-05-04 NOTE — CONSULTS
"GI CONSULT  NOTE:    Referring Provider:  Dr. Miller    Chief complaint: Nausea/vomiting, abdominal pain    Subjective .     History of present illness: Aj Plaza is a 48 y.o. male with history of COPD, DMII, hypertension, hyperlipidemia, and chronic back pain who presents with complaints of abdominal pain and nausea/vomiting.  The patient reports that he has been having intermittent bouts of abdominal pain and nausea/vomiting his \"entire life.\"  However, recently these flares have been occurring more frequently.  He states that this flare began yesterday.  He reports generalized abdominal pain that is worse in the epigastric area and radiates to the left lower quadrant.  He is unable to describe the pain.  The pain occurs intermittently.  Unable to identify any exacerbating or alleviating factors.  He does complain of associated nausea/vomiting, but denies any hematemesis or coffee-ground emesis.  No heartburn.  Does complain of dysphagia to both solids and liquids.  Denies any NSAID use.  He states that he has been moving his bowels regularly approximately every other day.  However, consistency of his stool ranges from loose to hard.  Denies any bright red blood per rectum or melena.  No recent fever or unintentional weight loss.      Endo History:  2015 colonoscopy (Dr. Tenorio) -polyps ([place], [place])    Past Medical History:  Past Medical History:   Diagnosis Date   • Hyperlipidemia    • Hypertension    • Knee pain    • Low back pain    • Neck pain    • Type 2 diabetes mellitus        Past Surgical History:  No past surgical history on file.    Social History:  Social History     Tobacco Use   • Smoking status: Former     Packs/day: 2.00     Years: 30.00     Pack years: 60.00     Types: Cigarettes     Quit date:      Years since quittin.3   • Smokeless tobacco: Former   Vaping Use   • Vaping Use: Never used   Substance Use Topics   • Alcohol use: Not Currently     Comment: new years   • Drug use: Not " Currently     Types: Marijuana     Comment: used in the past 4/20/22       Family History:  Family History   Problem Relation Age of Onset   • Diabetes Maternal Grandmother    • Hypertension Maternal Grandmother    • Hyperlipidemia Maternal Grandmother    • Cancer Maternal Grandfather    • Cancer Father        Medications:  (Not in a hospital admission)      Scheduled Meds:atorvastatin, 40 mg, Oral, Nightly  insulin glargine, 20 Units, Subcutaneous, Daily  losartan, 100 mg, Oral, Q24H  pregabalin, 200 mg, Oral, BID  roflumilast, 500 mcg, Oral, Daily  sodium chloride, 10 mL, Intravenous, Q12H      Continuous Infusions:   PRN Meds:.•  [COMPLETED] Insert Peripheral IV **AND** sodium chloride  •  sodium chloride  •  sodium chloride  •  traMADol    ALLERGIES:  Patient has no known allergies.    ROS:  The following systems were reviewed and negative;   Constitution:  No fevers, chills, no unintentional weight loss  Skin: no rash, no jaundice  Eyes:  No blurry vision, no eye pain  HENT:  No change in hearing or smell  Resp:  No dyspnea or cough  CV:  No chest pain or palpitations  :  No dysuria, hematuria  Musculoskeletal:  No leg cramps or arthralgias  Neuro:  No tremor, no numbness  Psych:  No depression or confusion    Objective     Vital Signs:   Vitals:    05/04/23 0430 05/04/23 0500 05/04/23 0729 05/04/23 0737   BP: 144/84 152/87 149/91 149/91   BP Location:    Right arm   Patient Position:    Lying   Pulse: 90 83 83 84   Resp:    16   Temp:       TempSrc:       SpO2: 96% 97% 96% 96%   Weight:       Height:           Physical Exam:       General Appearance:    Awake and alert, in no acute distress   Head:    Normocephalic, without obvious abnormality, atraumatic   Throat:   No oral lesions, no thrush, oral mucosa moist   Lungs:     Respirations regular, even and unlabored   Chest Wall:    No abnormalities observed   Abdomen:     Soft, mild generalized tenderness, no rebound or guarding, non-distended   Rectal:      Deferred   Extremities:   Moves all extremities, no edema, no cyanosis   Pulses:   Pulses palpable and equal bilaterally   Skin:   No rash, no jaundice, normal palpation   Lymph nodes:   No cervical, supraclavicular or submandibular palpable adenopathy   Neurologic:   Cranial nerves 2 - 12 grossly intact, no asterixis       Results Review:   I reviewed the patient's labs and imaging.  CBC    Results from last 7 days   Lab Units 05/04/23  0611 05/03/23  2132   WBC 10*3/mm3 14.10* 16.20*   HEMOGLOBIN g/dL 11.9* 16.9   PLATELETS 10*3/mm3 166 246     CMP   Results from last 7 days   Lab Units 05/03/23  2132   SODIUM mmol/L 141   POTASSIUM mmol/L 4.0   CHLORIDE mmol/L 102   CO2 mmol/L 21.0*   BUN mg/dL 17   CREATININE mg/dL 0.81   GLUCOSE mg/dL 166*   ALBUMIN g/dL 5.3*   BILIRUBIN mg/dL 0.7   ALK PHOS U/L 86   AST (SGOT) U/L 22   ALT (SGPT) U/L 23   MAGNESIUM mg/dL 1.7   LIPASE U/L 48     Cr Clearance Estimated Creatinine Clearance: 132 mL/min (by C-G formula based on SCr of 0.81 mg/dL).  Coag     HbA1C   Lab Results   Component Value Date    HGBA1C 7.20 (H) 05/04/2023    HGBA1C 6.8 (H) 07/29/2022    HGBA1C 7.1 (H) 02/22/2022     Blood Glucose   Glucose   Date/Time Value Ref Range Status   05/04/2023 0854 166 (H) 70 - 105 mg/dL Final     Comment:     Serial Number: [id number]:  [id number]   05/03/2023 2043 163 (H) 70 - 105 mg/dL Final     Comment:     Serial Number: [id number]:  [id number]     Infection     UA    Results from last 7 days   Lab Units 05/03/23  2143   NITRITE UA  Negative   WBC UA /HPF 0-2*   BACTERIA UA /HPF None Seen   SQUAM EPITHEL UA /HPF 0-2     Radiology(recent) CT Abdomen Pelvis With Contrast    Result Date: 5/4/2023  1.  Moderate fat-containing epigastric ventral hernia. Minor associated fat stranding, while similar to prior exam, raises possibility of incarceration. 2.  Otherwise, no acute abnormality. Electronically signed by:  Vania Sorenson M.D.  5/3/2023 11:14 PM Mountain Time    "      ASSESSMENT:  -Generalized abdominal pain  -Nausea/vomiting  -Dysphagia  -Mild normocytic anemia  -Leukocytosis  -Marijuana abuse  -COPD  -DMII  -Hypertension  -Hyperlipidemia    PLAN:  Patient is a 48-year-old male with history of COPD, DMII, and hypertension who presents with complaints of abdominal pain and nausea/vomiting.  Reports cyclic bouts of abdominal pain and nausea/vomiting for his \"entire life.\"     Differential diagnosis includes cannabis hyperemesis syndrome, abdominal migraine, gastroparesis, or other.  CT abdomen/pelvis shows a moderate fat-containing ventral hernia with fat stranding, but is otherwise unremarkable.  LFTs normal.  We will check lipase.  Drug screen on admission was positive for THC and opiates.  We will start alternating Reglan and Zofran.  Also start daily amitriptyline.  Plan EGD tomorrow. NPO after midnight.  Could consider outpatient gastric emptying study if EGD is unremarkable.  Clear liquid diet.  Supportive care.      I discussed the patients findings and my recommendations with the patient.  I will discuss case with Dr. Muniz and change the plan accordingly.    We appreciate the referral.    Electronically signed by LISA Reid, 05/04/23, 1:15 PM EDT.                "

## 2023-05-04 NOTE — CASE MANAGEMENT/SOCIAL WORK
Discharge Planning Assessment  [facility]     Patient Name: Aj Plaza  MRN: [id number]  Today's Date: 5/4/2023    Admit Date: 5/3/2023    Plan: DC PLAN: Routine home      Discharge Needs Assessment     Row Name 05/04/23 0852       Living Environment    People in Home child(yolanda), adult    Current Living Arrangements home    Primary Care Provided by self    Provides Primary Care For no one    Able to Return to Prior Arrangements yes       Resource/Environmental Concerns    Resource/Environmental Concerns none       Food Insecurity    Within the past 12 months, you worried that your food would run out before you got the money to buy more. Never true    Within the past 12 months, the food you bought just didn't last and you didn't have money to get more. Never true       Transition Planning    Patient/Family Anticipates Transition to home with family    Patient/Family Anticipated Services at Transition     Transportation Anticipated car, drives self;family or friend will provide       Discharge Needs Assessment    Equipment Currently Used at Home glucometer;cane, straight;walker, standard               Discharge Plan     Row Name 05/04/23 0855       Plan    Plan DC PLAN: Routine home    Patient/Family in Agreement with Plan yes    Plan Comments Met with patient at bedside, from routine home. Independent with ADL's uses a cane or walker from time to time. Working glucometer, checks blood sugar as istructed. PCP is Taisha. Pharmacy is [facility]. Able to afford medications, denies any transportation issues, still drives. Denies any concerns about returning home.Possible need for DM educator consult.              Continued Care and Services - Admitted Since 5/3/2023    Coordination has not been started for this encounter.          Demographic Summary     Row Name 05/04/23 0851       General Information    Admission Type observation    Arrived From emergency department    Referral Source admission list     Reason for Consult discharge planning    Preferred Language English       Contact Information    Permission Granted to Share Info With     Contact Information Obtained for                Functional Status     Row Name 05/04/23 0852       Functional Status    Usual Activity Tolerance moderate    Current Activity Tolerance moderate       Assessment of Health Literacy    How often do you have someone help you read hospital materials? Sometimes    How often do you have problems learning about your medical condition because of difficulty understanding written information? Sometimes    How often do you have a problem understanding what is told to you about your medical condition? Sometimes    How confident are you filling out medical forms by yourself? Somewhat    Health Literacy Moderate       Functional Status, IADL    Medications independent    Meal Preparation independent    Housekeeping independent    Laundry independent    Shopping independent       Mental Status    General Appearance WDL WDL                   Patient Forms     Rowame 05/04/23 0851       Patient Forms    Important Message from Medicare (IMM) Delivered  Schultz 5/4 per registration    Delivered to Patient    Method of delivery In person              Met with patient in room    Maintained distance greater than six feet and spent less than 15 minutes in the room.        Kristin Pryor RN     Office phone: [phone]  Cell phone: [phone]

## 2023-05-04 NOTE — ED PROVIDER NOTES
"Subjective      Provider in Triage Note  Patient is a 48-YO male with DM history who presents with complaints of abd pain and vomiting since yesterday after pharmacy ran out of insulin over the weekend. He was able to get his meds but isn't feeling better. Glucose has been \"really bad\" over the past several days. Takes tramadol and lyrica for chronic pain. No CP, fever, dyuria.      History of Present Illness  Agree with above pit provider note.  Patient is a 48-year-old  male history of diabetes, hyperlipidemia, hypertension presents to the ER with complaints of abdominal pain, nausea vomiting since yesterday.  Patient states the pharmacy ran out of his insulin over the weekend.  Patient states he was able to get his medicine yesterday but is not feeling much better.  No hematemesis.,  No hematochezia or melena.  No diarrhea.  No chest pain or shortness of breath.  Patient blood pressure is also high today.  He states he has not taken his losartan.  No fever chills.  No previous abdominal surgeries.    History provided by:  Patient      Review of Systems   Constitutional: Negative for chills and fever.   HENT: Negative for sore throat and trouble swallowing.    Eyes: Negative.    Respiratory: Negative for shortness of breath and wheezing.    Cardiovascular: Negative for chest pain.   Gastrointestinal: Positive for abdominal pain, nausea and vomiting. Negative for diarrhea.   Endocrine: Negative.    Genitourinary: Negative for dysuria.   Musculoskeletal: Negative for myalgias.   Skin: Negative for rash.   Allergic/Immunologic: Negative.    Neurological: Negative for numbness and headaches.   Psychiatric/Behavioral: Negative for behavioral problems.   All other systems reviewed and are negative.      Past Medical History:   Diagnosis Date   • Hyperlipidemia    • Hypertension    • Knee pain    • Low back pain    • Neck pain    • Type 2 diabetes mellitus        No Known Allergies    No past surgical history on " file.    Family History   Problem Relation Age of Onset   • Diabetes Maternal Grandmother    • Hypertension Maternal Grandmother    • Hyperlipidemia Maternal Grandmother    • Cancer Maternal Grandfather    • Cancer Father        Social History     Socioeconomic History   • Marital status:    Tobacco Use   • Smoking status: Former     Packs/day: 2.00     Years: 30.00     Pack years: 60.00     Types: Cigarettes     Quit date:      Years since quittin.3   • Smokeless tobacco: Former   Vaping Use   • Vaping Use: Never used   Substance and Sexual Activity   • Alcohol use: Not Currently     Comment: new years   • Drug use: Not Currently     Types: Marijuana     Comment: used in the past 22   • Sexual activity: Not Currently     Partners: Female           Objective   Physical Exam  Vitals and nursing note reviewed.   Constitutional:       General: He is in acute distress.      Appearance: He is well-developed and normal weight. He is not diaphoretic.   HENT:      Head: Normocephalic.      Mouth/Throat:      Mouth: Mucous membranes are moist.   Eyes:      Extraocular Movements: Extraocular movements intact.   Cardiovascular:      Rate and Rhythm: Normal rate and regular rhythm.      Heart sounds: Normal heart sounds. No murmur heard.  Pulmonary:      Effort: Pulmonary effort is normal.      Breath sounds: Normal breath sounds.   Abdominal:      General: Abdomen is flat. Bowel sounds are normal.      Palpations: Abdomen is soft.      Tenderness: There is generalized abdominal tenderness. There is guarding. There is no right CVA tenderness, left CVA tenderness or rebound.   Skin:     General: Skin is warm.      Capillary Refill: Capillary refill takes less than 2 seconds.   Neurological:      General: No focal deficit present.      Mental Status: He is alert and oriented to person, place, and time.   Psychiatric:         Mood and Affect: Mood normal.         Behavior: Behavior normal.  "        Procedures           ED Course  ED Course as of 05/04/23 0209   Wed May 03, 2023   2342 Patient states that he has not taken his blood pressure medicines today []   u May 04, 2023   0021 Waiting for patient go to CT []   0156 I spoke with Dr. Hawthorne who agreed to accept patient for admission []      ED Course User Index  [] Jacquelyn Olivas Kay, PA    BP (!) 169/114   Pulse 109   Temp 97.7 °F (36.5 °C) (Oral)   Resp 19   Ht 182.9 cm (72\")   Wt 95 kg (209 lb 7 oz)   SpO2 99%   BMI 28.40 kg/m²   Labs Reviewed   COMPREHENSIVE METABOLIC PANEL - Abnormal; Notable for the following components:       Result Value    Glucose 166 (*)     CO2 21.0 (*)     Albumin 5.3 (*)     Anion Gap 18.0 (*)     All other components within normal limits    Narrative:     GFR Normal >60  Chronic Kidney Disease <60  Kidney Failure <15     URINALYSIS W/ MICROSCOPIC IF INDICATED (NO CULTURE) - Abnormal; Notable for the following components:    Color, UA Dark Yellow (*)     pH, UA 8.5 (*)     Ketones, UA >=160 mg/dL (4+) (*)     Protein,  mg/dL (2+) (*)     Leuk Esterase, UA Trace (*)     All other components within normal limits   CBC WITH AUTO DIFFERENTIAL - Abnormal; Notable for the following components:    WBC 16.20 (*)     Neutrophil % 84.8 (*)     Lymphocyte % 11.6 (*)     Monocyte % 2.9 (*)     Eosinophil % 0.2 (*)     Neutrophils, Absolute 13.70 (*)     All other components within normal limits   LACTIC ACID, REFLEX - Abnormal; Notable for the following components:    Lactate 2.7 (*)     All other components within normal limits   BLOOD GAS, VENOUS - Abnormal; Notable for the following components:    pH, Venous 7.530 (*)     pCO2, Venous 25.5 (*)     pO2, Venous 34.1 (*)     HCO3, Venous 21.2 (*)     All other components within normal limits   URINALYSIS, MICROSCOPIC ONLY - Abnormal; Notable for the following components:    RBC, UA 0-2 (*)     WBC, UA 0-2 (*)     All other components within normal limits   POCT " GLUCOSE FINGERSTICK - Abnormal; Notable for the following components:    Glucose 163 (*)     All other components within normal limits   POC LACTATE - Abnormal; Notable for the following components:    Lactate 2.4 (*)     All other components within normal limits   LIPASE - Normal   MAGNESIUM - Normal   ACETONE - Normal   BLOOD GAS, VENOUS   RAINBOW DRAW    Narrative:     The following orders were created for panel order [place] Draw.  Procedure                               Abnormality         Status                     ---------                               -----------         ------                     Green Top (Gel)[[id number]]                                                             Lavender Top[[id number]]                                                                Gold Top - SST[[id number]]                                   Final result               Light Blue Top[[id number]]                                   Final result                 Please view results for these tests on the individual orders.   ETHANOL   URINE DRUG SCREEN   CBC AND DIFFERENTIAL    Narrative:     The following orders were created for panel order CBC & Differential.  Procedure                               Abnormality         Status                     ---------                               -----------         ------                     CBC Auto Differential[[id number]]        Abnormal            Final result                 Please view results for these tests on the individual orders.   KETONE BODIES SERUM    Narrative:     The following orders were created for panel order Ketone Bodies, Serum (Not performed at [place]).  Procedure                               Abnormality         Status                     ---------                               -----------         ------                     Acetone[[id number]]                      Normal              Final result                 Please view results for these tests on the  individual orders.   GOLD TOP - SST   LIGHT BLUE TOP     Medications   sodium chloride 0.9 % flush 10 mL (has no administration in time range)   losartan (COZAAR) tablet 100 mg (100 mg Oral Given 5/4/23 0205)   sodium chloride 0.9 % bolus 1,000 mL (0 mL Intravenous Stopped 5/4/23 0057)   morphine injection 4 mg (4 mg Intravenous Given 5/3/23 2217)   ondansetron (ZOFRAN) injection 4 mg (4 mg Intravenous Given 5/3/23 2217)   prochlorperazine (COMPAZINE) injection 10 mg (10 mg Intravenous Given 5/3/23 2306)   diphenhydrAMINE (BENADRYL) injection 25 mg (25 mg Intravenous Given 5/3/23 2306)   hydrALAZINE (APRESOLINE) injection 10 mg (10 mg Intravenous Given 5/3/23 2353)   iopamidol (ISOVUE-370) 76 % injection 100 mL (100 mL Intravenous Given 5/4/23 0049)   HYDROmorphone (DILAUDID) injection 0.5 mg (0.5 mg Intravenous Given 5/4/23 0202)   labetalol (NORMODYNE,TRANDATE) injection 10 mg (10 mg Intravenous Given 5/4/23 0202)     CT Abdomen Pelvis With Contrast    Result Date: 5/4/2023  1.  Moderate fat-containing epigastric ventral hernia. Minor associated fat stranding, while similar to prior exam, raises possibility of incarceration. 2.  Otherwise, no acute abnormality. Electronically signed by:  Vania Sorenson M.D.  5/3/2023 11:14 PM Mountain Time                                           Medical Decision Making  Differential Dx (Includes but not limited to): Alcohol withdrawal, DKA, HHS, diverticulitis, UTI, obstruction, incarcerated hernia, mesenteric ischemia  Medical Records Reviewed: Patient admitted earlier this year for complaints of abdominal pain, nausea vomiting as well.  CT imaging was similar to today showing umbilical hernia  Labs: On my interpretation lactate 2.7, urinalysis negative however patient is dry 4+ ketones.  VBG pH elevated 7.5.  CBC mild leukocytosis 16,000.  CMP glucose 166, CO2 21.0.  Negative acetone.  Imaging: On my interpretation CT abdomen pelvis suggestive of umbilical hernia with mild  fat stranding  Telemetry: N/A  Testing considered but not ordered: Chest x-ray patient denies any URI symptoms.  Nature of Complaint: Acute  Admission vs Discharge: Admission  Discussion: While in the ED IV was placed and labs were obtained appropriate PPE was worn during exam and throughout all encounters with the patient.  Patient had the above evaluation.  IV established, lab work obtained.  Patient given multiple doses of pain medication including morphine, Dilaudid, Compazine and Benadryl.  Patient also hypertensive was given a dose of hydralazine, labetalol with little improvement.  He was given his normal dose of Cozaar.  Lab work significant for elevated lactate 2.7, CBC leukocytosis 16,000.  No source of infection identified.  CT abdomen pelvis significant for fat-containing umbilical hernia with mild fat stranding suggestive of incarceration.  No evidence of small bowel incarceration.  Antibiotics not administered, elevated lactate felt to be metabolic, not infectious.  Patient be admitted for pain control, hypertensive treatment as well and possible surgery consultation.  Patient agreeable.  I spoke with Dr. Hawthorne regarding admission.    Generalized abdominal pain: acute illness or injury  Hypertension, unspecified type: acute illness or injury  Nausea and vomiting, unspecified vomiting type: acute illness or injury  Umbilical hernia without obstruction and without gangrene: acute illness or injury  Amount and/or Complexity of Data Reviewed  Radiology: ordered. Decision-making details documented in ED Course.      Risk  Prescription drug management.  Decision regarding hospitalization.          Final diagnoses:   Generalized abdominal pain   Umbilical hernia without obstruction and without gangrene   Nausea and vomiting, unspecified vomiting type   Hypertension, unspecified type       ED Disposition  ED Disposition     ED Disposition   Decision to Admit    Condition   --    Comment   Level of Care:  Telemetry [5]   Admitting Physician: JONH FINNEY [[id number]]   Attending Physician: JONH FINNEY [[id number]]               No follow-up provider specified.       Medication List      No changes were made to your prescriptions during this visit.          Jacquelyn Olivas PA  05/04/23 0209

## 2023-05-04 NOTE — H&P
[facility] Medicine Services  History & Physical    Patient Name: Aj Plaza  : 1975  MRN: [id number]  Primary Care Physician:  Devi Sumner APRN  Date of admission: 5/3/2023  Date and Time of Service: 23 at 1:56 AM EDT    Subjective      Chief Complaint: Abdominal pain    History of Present Illness: Aj Plaza is a 48 y.o. male with a medical history notable for COPD, type 2 diabetes mellitus, and hypertension who presented to [facility] on 5/3/2023 complaining of abdominal pain.    Patient reported severe lower and epigastric abdominal pain.  He mentioned he tends to get this type of pain once a year.  He has a known ventral hernia that he intermittently has to reduce.  He denied fever, chills, or sick contacts.  He does have associated nausea and vomiting a lot of diarrhea.  His last bowel movement on Tuesday.  Patient denied all use and is a former smoker.  He quit about 8-9 years ago.  No recent changes in medications.  He reports taking his medications as prescribed.    Of note, patient has known history of uncontrolled blood pressure during his past few visits to his doctor's appointment. He denied headache or lightheadedness.    While in the ED, patient received 1L normal saline bolus, Benadryl 25 mg IV, hydralazine 10 mg IV, morphine 4 mg IV, Zofran 4 mg IV, Compazine 10 mg IV, hydromorphone 0.5 mg IV, labetalol 10 mg IV, and losartan 100 mg p.o.      Review of Systems   Constitutional: Negative for chills and fever.   Cardiovascular: Negative for chest pain and leg swelling.   Respiratory: Negative for shortness of breath.    Gastrointestinal: Positive for abdominal pain, anorexia, diarrhea, nausea and vomiting. Negative for hematochezia and melena.   Genitourinary: Negative for dysuria and hematuria.   Neurological: Negative for dizziness and light-headedness.       Personal History     Past Medical History:   Diagnosis Date   • Hyperlipidemia    •  Hypertension    • Knee pain    • Low back pain    • Neck pain    • Type 2 diabetes mellitus        No past surgical history on file.    Family History: family history includes Cancer in his father and maternal grandfather; Diabetes in his maternal grandmother; Hyperlipidemia in his maternal grandmother; Hypertension in his maternal grandmother. Otherwise pertinent FHx was reviewed and not pertinent to current issue.    Social History:  reports that he quit smoking about 6 years ago. His smoking use included cigarettes. He has a 60.00 pack-year smoking history. He has quit using smokeless tobacco. He reports that he does not currently use alcohol. He reports that he does not currently use drugs after having used the following drugs: Marijuana.    Home Medications:  Prior to Admission Medications     Prescriptions Last Dose Informant Patient Reported? Taking?    Accu-Chek Softclix Lancets lancets   No No    1 each by Other route 2 (Two) Times a Day. Use to monitor glucose twice daily. E11.9    albuterol sulfate  (90 Base) MCG/ACT inhaler   No No    Inhale 2 puffs Every 4 (Four) Hours As Needed for Wheezing.    atorvastatin (LIPITOR) 40 MG tablet   No No    Take 1 tablet by mouth Every Night.    B-D UF III MINI PEN NEEDLES 31G X 5 MM misc   No No    Inject 1 each under the skin into the appropriate area as directed Daily. E11.9    Budeson-Glycopyrrol-Formoterol (Breztri Aerosphere) 160-9-4.8 MCG/ACT aerosol inhaler   No No    Inhale 2 puffs 2 (Two) Times a Day. Rinse mouth after each use    EPINEPHrine (EPIPEN) 0.3 MG/0.3ML solution auto-injector injection   Yes No    glucose blood test strip   No No    Use to monitor glucose twice daily. Dx: E11.69    glucose monitor monitoring kit   No No    Use to monitor glucose twice a day. Dx: E11.69    insulin detemir (Levemir FlexTouch) 100 UNIT/ML injection   No No    Inject 20 Units under the skin into the appropriate area as directed Daily.    Janumet XR  MG  tablet   No No    TAKE 2 TABLETS BY MOUTH DAILY    losartan (COZAAR) 100 MG tablet   No No    Take 1 tablet by mouth Daily.    pregabalin (LYRICA) 200 MG capsule   No No    Take 1 capsule by mouth 2 (Two) Times a Day.    roflumilast (Daliresp) 500 MCG tablet tablet   No No    Take 1 tablet by mouth Daily.    traMADol (ULTRAM) 50 MG tablet   No No    Take 1 tablet by mouth Every 6 (Six) Hours As Needed for Moderate Pain.    traMADol (ULTRAM) 50 MG tablet   No No    Take 1 tablet by mouth Every 6 (Six) Hours As Needed for Moderate Pain.    traMADol (ULTRAM) 50 MG tablet   No No    Take 1 tablet by mouth Every 6 (Six) Hours As Needed for Moderate Pain.            Allergies:  No Known Allergies    Objective      Vitals:   Temp:  [97.7 °F (36.5 °C)] 97.7 °F (36.5 °C)  Heart Rate:  [] 86  Resp:  [19] 19  BP: (143-186)/() 162/105    Physical Exam   General:  Appears in no acute distress, nontoxic appearing  HEENT:  Normocephalic. Normal conjunctivae, EOM intact bilaterally, pupils equal. No mucosal pallor or cyanosis. No oral lesions.   Respiratory: Respirations regular and unlabored at rest. Bilateral breath sounds with good air entry in all fields. No crackles, rubs or wheezes auscultated  Cardiovascular: S1S2 Regular rate and rhythm. No murmur, rub or gallop. No pretibial pitting edema  Gastrointestinal: Abdomen soft, flat, tender in suprapubic/lower abdomen, epigastric areas Bowel sounds present. No rebound or guarding. +reducible ventral hernia  Musculoskeletal: Moves all extremities voluntarily. No abnormal movements  Extremities: No digital clubbing or cyanosis  Skin: Color pink. Skin warm and dry to touch. No rashes    Neuro: AAO x3, CN II-XII grossly intact, comprehension intact, follows commands appropriately  Psych: Mood and affect normal, pleasant and cooperative      Result Review    Result Review:  I have personally reviewed the results from the time of this admission to 5/4/2023 04:01 EDT and  agree with these findings:  [x]  Laboratory  []  Microbiology  [x]  Radiology  []  EKG/Telemetry   []  Cardiology/Vascular   []  Pathology  []  Old records  []  Other:    LAB RESULTS:      Lab 05/04/23  0058 05/03/23 2139 05/03/23 2132   WBC  --   --  16.20*   HEMOGLOBIN  --   --  16.9   HEMATOCRIT  --   --  48.9   PLATELETS  --   --  246   NEUTROS ABS  --   --  13.70*   LYMPHS ABS  --   --  1.90   MONOS ABS  --   --  0.50   EOS ABS  --   --  0.00   MCV  --   --  87.8   LACTATE 2.7* 2.4*  --          Lab 05/03/23 2132   SODIUM 141   POTASSIUM 4.0   CHLORIDE 102   CO2 21.0*   ANION GAP 18.0*   BUN 17   CREATININE 0.81   EGFR 108.1   GLUCOSE 166*   CALCIUM 10.4   MAGNESIUM 1.7         Lab 05/03/23 2132   TOTAL PROTEIN 8.3   ALBUMIN 5.3*   GLOBULIN 3.0   ALT (SGPT) 23   AST (SGOT) 22   BILIRUBIN 0.7   ALK PHOS 86   LIPASE 48                     Brief Urine Lab Results  (Last result in the past 365 days)      Color   Clarity   Blood   Leuk Est   Nitrite   Protein   CREAT   Urine HCG        05/03/23 2143 Dark Yellow   Clear   Negative   Trace   Negative   100 mg/dL (2+)                 EXAMINATION: CT SCAN OF THE ABDOMEN AND PELVIS WITH INTRAVENOUS CONTRAST   DATE OF EXAM: 5/4/2023 12:42 AM     FINDINGS:   ABDOMEN/PELVIS:     Lower Chest: Normal.     Liver: Minor steatosis.   Gallbladder/Biliary: Normal.   Pancreas: Normal.   Spleen: Normal.     Adrenal Glands: Normal.   Kidneys, Ureters, Urinary Bladder: 1.8 cm cyst upper pole right kidney. No hydronephrosis. Bladder unremarkable.     GI Tract: No bowel dilation or wall thickening. Appendix normal.     Mesentery/Peritoneum: Normal.     Reproductive: Normal.     Vasculature: Minor atherosclerotic calcifications. No abdominal aortic aneurysm.     Lymph Nodes: Normal.     Abdominal Wall: 5 cm fat-containing ventral hernia in the epigastric region. There is minor stranding of the herniating fat as on prior exam.   Musculoskeletal: Minor degenerative disc disease lower  lumbar spine.     Impression:    1.  Moderate fat-containing epigastric ventral hernia. Minor associated fat stranding, while similar to prior exam, raises possibility of incarceration.   2.  Otherwise, no acute abnormality.         Assessment & Plan        Aj Plaza is a 48 y.o. male with a medical history notable for COPD, type 2 diabetes mellitus, and hypertension who presented to [facility] on 5/3/2023 complaining of abdominal pain, found to have lactic acidosis and leukocytosis, admitted for further w/u and symptom management    Plan:   Abdominal pain with nausea/vomiting, recurrent  Unclear etiology. Pt with known epigastric ventral hernia which is site of most intense pain. DDx includes undiagnosed gastroparesis (pt with hx of peripheral neuropathy and Type 2 DM) vs dyspepsia/GERD/PUD vs acute infection. CT abd/pelvis negative for acute pathology. Noted urine tox screen positive for THC, which may cause n/v but low suspicion for source of abdominal pain  - IV Zofran 4mg PRN  - IV fluids  - trend lactic acid --> lactic acidosis likely due to persistent n/v  - check GI stool PCR  - check procalcitonin due to associated leukocytosis  - trial of daily famotidine     Leukocytosis  Suspect stress response, rather than acute infection at this time   - defer empiric antibiotics  -Trend WBC  -Check procalcitonin     Microscopic hematuria  - Given pt's age and hx of cigarette use, recommend outpatient referral to urology for further w/u  - repeat UA as an outpatient to assess for resolution    Diabetes mellitus type 2  -Monitor blood glucose before meals and at bedtime   -sliding scale insulin initiated   -hold p.o. diabetic medication  -Lantus 20 units daily     Peripheral neuropathy  -Continue Lyrica and tramadoL PRN     Chronic essential hypertension  -Continue losartan 100mg daily  -Continuous cardiac monitoring   -monitor BMP     Chronic obstructive pulmonary disease  -Continue home  bronchodilators   -continue Daliresp     Hyperlipidemia  -Continue statin    DVT prophylaxis:  Mechanical DVT prophylaxis orders are present.    CODE STATUS:    Code Status (Patient has no pulse and is not breathing): CPR (Attempt to Resuscitate)  Medical Interventions (Patient has pulse or is breathing): Full Support    Admission Status:  I believe this patient meets inpatient observation status.    I discussed the patient's findings and my recommendations with patient and nursing staff.      Signature: Electronically signed by Cintia Hawthorne MD, 05/04/23, 04:01 EDT.  [facility] Hospitalist Team

## 2023-05-04 NOTE — PROGRESS NOTES
[facility] Medicine Services   Daily Progress Note    Patient Name: Aj Plaza  : 1975  MRN: [id number]  Primary Care Physician:  Devi Sumner APRN  Date of admission: 5/3/2023  Date and Time of Service: 2023 at 5:30 pm      Subjective      Chief Complaint: Abdominal pain, nausea and vomiting    Patient seen today.  He vomited not too long ago.  Reports pain around the belly button 5/10.  GI saw and planning EGD tomorrow.    ROS negative except what was mentioned above.      Objective      Vitals:   Temp:  [97.7 °F (36.5 °C)-97.8 °F (36.6 °C)] 97.8 °F (36.6 °C)  Heart Rate:  [] 82  Resp:  [16-19] 16  BP: (143-186)/() 149/105    Physical Exam  Constitutional:       General: He is not in acute distress.     Appearance: Normal appearance. He is not ill-appearing.   HENT:      Head: Normocephalic and atraumatic.      Nose: Nose normal.      Mouth/Throat:      Mouth: Mucous membranes are dry.   Eyes:      Pupils: Pupils are equal, round, and reactive to light.   Cardiovascular:      Rate and Rhythm: Normal rate and regular rhythm.   Pulmonary:      Effort: Pulmonary effort is normal.      Breath sounds: Normal breath sounds.   Abdominal:      General: Bowel sounds are normal.      Tenderness: There is abdominal tenderness. There is no guarding.   Musculoskeletal:         General: Normal range of motion.      Cervical back: Normal range of motion and neck supple.   Skin:     General: Skin is warm and dry.   Neurological:      General: No focal deficit present.      Mental Status: He is alert.   Psychiatric:         Mood and Affect: Mood normal.             Result Review    Result Review:  I have personally reviewed the results from the time of this admission to 2023 18:27 EDT and agree with these findings:  [x]  Laboratory  [x]  Microbiology  [x]  Radiology  [x]  EKG/Telemetry   [x]  Cardiology/Vascular   []  Pathology  []  Old records  []  Other:  Most notable findings  include: metabolic acidosis          Assessment & Plan      Brief Patient Summary:  Aj Plaza is a 48 y.o. male with a medical history notable for COPD, type 2 diabetes mellitus, and hypertension who presented to [facility] on 5/3/2023 complaining of abdominal pain.     Patient reported severe lower and epigastric abdominal pain.  He mentioned he tends to get this type of pain once a year.  He has a known ventral hernia that he intermittently has to reduce.  He denied fever, chills, or sick contacts.  He does have associated nausea and vomiting a lot of diarrhea.  His last bowel movement on Tuesday.  Patient denied all use and is a former smoker.  He quit about 8-9 years ago.  No recent changes in medications.  He reports taking his medications as prescribed.     Of note, patient has known history of uncontrolled blood pressure during his past few visits to his doctor's appointment. He denied headache or lightheadedness.     While in the ED, patient received 1L normal saline bolus, Benadryl 25 mg IV, hydralazine 10 mg IV, morphine 4 mg IV, Zofran 4 mg IV, Compazine 10 mg IV, hydromorphone 0.5 mg IV, labetalol 10 mg IV, and losartan 100 mg p.o.    He was admitted.  GI consulted and started him on reglan 10 mg IV 4 times a day.  Possibility of gastroparesis vs cyclic vomiting syndrome since he has had this before.  A1C checked on him and 7.2%.  EGD 5/5/2023.      amitriptyline, 25 mg, Oral, Nightly  amLODIPine, 10 mg, Oral, Q24H  atorvastatin, 40 mg, Oral, Nightly  insulin glargine, 1-200 Units, Subcutaneous, Nightly - Glucommander  insulin lispro, 1-200 Units, Subcutaneous, Q4H  losartan, 100 mg, Oral, Q24H  metoclopramide, 10 mg, Intravenous, Q6H  ondansetron, 4 mg, Intravenous, Q6H  [START ON 5/5/2023] pantoprazole, 40 mg, Oral, Q AM  pregabalin, 200 mg, Oral, BID  roflumilast, 500 mcg, Oral, Daily  sodium chloride, 10 mL, Intravenous, Q12H             Active Hospital Problems:  Active Hospital  Problems    Diagnosis    • **Generalized abdominal pain      Plan:   -GI TO DO EGD IN AM.  IF NEGATIVE WILL CONSIDER GASTRIC EMPTYING STUDY AS OUTPATIENT.  -BP HIGH SO WILL ADD AMLODIPINE 10 MG DAILY TO HIS LOSARTAN 100 MG DAILY.  STRESSED COMPLIANCE  -REGLAN AND ZOFRAN  -POSSIBLE D/C IN 1-2 DAYS.    DVT prophylaxis:  Mechanical DVT prophylaxis orders are present.    CODE STATUS:    Code Status (Patient has no pulse and is not breathing): CPR (Attempt to Resuscitate)  Medical Interventions (Patient has pulse or is breathing): Full Support      Disposition:  I expect patient to be discharged 1-2 DAYS.    This patient has been examined wearing appropriate Personal Protective Equipment and discussed with hospital infection control department. 05/04/23      Electronically signed by Cm Miller MD, 05/04/23, 18:27 EDT.  [facility] Hospitalist Team

## 2023-05-04 NOTE — ED NOTES
"Pt states that he started having suprapubic abd pain around 1100 this morning. HE states that it started with a headache and then the abd pain. Pt states that he has had N/V/D. Pt states that he did not some \"red stuff\" in his emesis. Denies any abd surgeries. He is diaphoretic, restless and tachypenic in PIT. Room requested  "

## 2023-05-04 NOTE — ED NOTES
ER provider notified of POC lactate. Room assignment escalated and patient to be placed in next open room.

## 2023-05-05 ENCOUNTER — ANESTHESIA (OUTPATIENT)
Dept: GASTROENTEROLOGY | Facility: HOSPITAL | Age: 48
End: 2023-05-05
Payer: MEDICARE

## 2023-05-05 ENCOUNTER — APPOINTMENT (OUTPATIENT)
Dept: ULTRASOUND IMAGING | Facility: HOSPITAL | Age: 48
End: 2023-05-05
Payer: MEDICARE

## 2023-05-05 ENCOUNTER — ON CAMPUS - OUTPATIENT (OUTPATIENT)
Dept: URBAN - METROPOLITAN AREA HOSPITAL 85 | Facility: HOSPITAL | Age: 48
End: 2023-05-05

## 2023-05-05 ENCOUNTER — ANESTHESIA EVENT (OUTPATIENT)
Dept: GASTROENTEROLOGY | Facility: HOSPITAL | Age: 48
End: 2023-05-05
Payer: MEDICARE

## 2023-05-05 ENCOUNTER — READMISSION MANAGEMENT (OUTPATIENT)
Dept: CALL CENTER | Facility: HOSPITAL | Age: 48
End: 2023-05-05
Payer: MEDICARE

## 2023-05-05 VITALS
SYSTOLIC BLOOD PRESSURE: 128 MMHG | TEMPERATURE: 97.8 F | BODY MASS INDEX: 28.25 KG/M2 | HEART RATE: 96 BPM | RESPIRATION RATE: 18 BRPM | WEIGHT: 208.56 LBS | HEIGHT: 72 IN | OXYGEN SATURATION: 97 % | DIASTOLIC BLOOD PRESSURE: 87 MMHG

## 2023-05-05 DIAGNOSIS — K26.9 DUODENAL ULCER, UNSPECIFIED AS ACUTE OR CHRONIC, WITHOUT HEM: ICD-10-CM

## 2023-05-05 DIAGNOSIS — K21.00 GASTRO-ESOPHAGEAL REFLUX DISEASE WITH ESOPHAGITIS, WITHOUT B: ICD-10-CM

## 2023-05-05 DIAGNOSIS — R10.13 EPIGASTRIC PAIN: ICD-10-CM

## 2023-05-05 DIAGNOSIS — R11.2 NAUSEA WITH VOMITING, UNSPECIFIED: ICD-10-CM

## 2023-05-05 PROBLEM — R11.10 VOMITING: Status: RESOLVED | Noted: 2023-02-06 | Resolved: 2023-05-05

## 2023-05-05 PROBLEM — R10.84 GENERALIZED ABDOMINAL PAIN: Status: RESOLVED | Noted: 2023-05-04 | Resolved: 2023-05-05

## 2023-05-05 LAB
ALBUMIN SERPL-MCNC: 4.7 G/DL (ref 3.5–5.2)
ALBUMIN/GLOB SERPL: 1.8 G/DL
ALP SERPL-CCNC: 71 U/L (ref 39–117)
ALT SERPL W P-5'-P-CCNC: 15 U/L (ref 1–41)
ANION GAP SERPL CALCULATED.3IONS-SCNC: 13 MMOL/L (ref 5–15)
AST SERPL-CCNC: 17 U/L (ref 1–40)
BASOPHILS # BLD AUTO: 0 10*3/MM3 (ref 0–0.2)
BASOPHILS NFR BLD AUTO: 0.2 % (ref 0–1.5)
BILIRUB SERPL-MCNC: 0.6 MG/DL (ref 0–1.2)
BUN SERPL-MCNC: 10 MG/DL (ref 6–20)
BUN/CREAT SERPL: 13.5 (ref 7–25)
CALCIUM SPEC-SCNC: 9.2 MG/DL (ref 8.6–10.5)
CHLORIDE SERPL-SCNC: 103 MMOL/L (ref 98–107)
CO2 SERPL-SCNC: 23 MMOL/L (ref 22–29)
CREAT SERPL-MCNC: 0.74 MG/DL (ref 0.76–1.27)
D-LACTATE SERPL-SCNC: 1 MMOL/L (ref 0.5–2)
DEPRECATED RDW RBC AUTO: 39.8 FL (ref 37–54)
EGFRCR SERPLBLD CKD-EPI 2021: 111.1 ML/MIN/1.73
EOSINOPHIL # BLD AUTO: 0 10*3/MM3 (ref 0–0.4)
EOSINOPHIL NFR BLD AUTO: 0.2 % (ref 0.3–6.2)
ERYTHROCYTE [DISTWIDTH] IN BLOOD BY AUTOMATED COUNT: 13 % (ref 12.3–15.4)
GLOBULIN UR ELPH-MCNC: 2.6 GM/DL
GLUCOSE BLDC GLUCOMTR-MCNC: 119 MG/DL (ref 70–105)
GLUCOSE BLDC GLUCOMTR-MCNC: 150 MG/DL (ref 70–105)
GLUCOSE BLDC GLUCOMTR-MCNC: 196 MG/DL (ref 70–105)
GLUCOSE SERPL-MCNC: 144 MG/DL (ref 65–99)
HCT VFR BLD AUTO: 44.2 % (ref 37.5–51)
HGB BLD-MCNC: 15.3 G/DL (ref 13–17.7)
LYMPHOCYTES # BLD AUTO: 2 10*3/MM3 (ref 0.7–3.1)
LYMPHOCYTES NFR BLD AUTO: 15.2 % (ref 19.6–45.3)
MCH RBC QN AUTO: 30.4 PG (ref 26.6–33)
MCHC RBC AUTO-ENTMCNC: 34.5 G/DL (ref 31.5–35.7)
MCV RBC AUTO: 88 FL (ref 79–97)
MONOCYTES # BLD AUTO: 1 10*3/MM3 (ref 0.1–0.9)
MONOCYTES NFR BLD AUTO: 7.5 % (ref 5–12)
NEUTROPHILS NFR BLD AUTO: 10.2 10*3/MM3 (ref 1.7–7)
NEUTROPHILS NFR BLD AUTO: 76.9 % (ref 42.7–76)
NRBC BLD AUTO-RTO: 0.1 /100 WBC (ref 0–0.2)
PLATELET # BLD AUTO: 197 10*3/MM3 (ref 140–450)
PMV BLD AUTO: 8 FL (ref 6–12)
POTASSIUM SERPL-SCNC: 3.4 MMOL/L (ref 3.5–5.2)
PROT SERPL-MCNC: 7.3 G/DL (ref 6–8.5)
RBC # BLD AUTO: 5.02 10*6/MM3 (ref 4.14–5.8)
SODIUM SERPL-SCNC: 139 MMOL/L (ref 136–145)
WBC NRBC COR # BLD: 13.2 10*3/MM3 (ref 3.4–10.8)

## 2023-05-05 PROCEDURE — 63710000001 PANTOPRAZOLE 40 MG TABLET DELAYED-RELEASE: Performed by: INTERNAL MEDICINE

## 2023-05-05 PROCEDURE — 25010000002 ONDANSETRON PER 1 MG: Performed by: NURSE PRACTITIONER

## 2023-05-05 PROCEDURE — A9270 NON-COVERED ITEM OR SERVICE: HCPCS | Performed by: INTERNAL MEDICINE

## 2023-05-05 PROCEDURE — 96376 TX/PRO/DX INJ SAME DRUG ADON: CPT

## 2023-05-05 PROCEDURE — 25010000002 ONDANSETRON PER 1 MG: Performed by: INTERNAL MEDICINE

## 2023-05-05 PROCEDURE — 94799 UNLISTED PULMONARY SVC/PX: CPT

## 2023-05-05 PROCEDURE — 88305 TISSUE EXAM BY PATHOLOGIST: CPT | Performed by: INTERNAL MEDICINE

## 2023-05-05 PROCEDURE — G0378 HOSPITAL OBSERVATION PER HR: HCPCS

## 2023-05-05 PROCEDURE — 63710000001 TRAMADOL 50 MG TABLET: Performed by: INTERNAL MEDICINE

## 2023-05-05 PROCEDURE — 25010000002 PROPOFOL 200 MG/20ML EMULSION

## 2023-05-05 PROCEDURE — 63710000001 SUCRALFATE 1 G TABLET: Performed by: INTERNAL MEDICINE

## 2023-05-05 PROCEDURE — 94664 DEMO&/EVAL PT USE INHALER: CPT

## 2023-05-05 PROCEDURE — 63710000001 INSULIN LISPRO (HUMAN) PER 5 UNITS: Performed by: INTERNAL MEDICINE

## 2023-05-05 PROCEDURE — 82948 REAGENT STRIP/BLOOD GLUCOSE: CPT

## 2023-05-05 PROCEDURE — 25010000002 METOCLOPRAMIDE PER 10 MG: Performed by: NURSE PRACTITIONER

## 2023-05-05 PROCEDURE — 76705 ECHO EXAM OF ABDOMEN: CPT

## 2023-05-05 PROCEDURE — 43239 EGD BIOPSY SINGLE/MULTIPLE: CPT | Performed by: INTERNAL MEDICINE

## 2023-05-05 RX ORDER — ONDANSETRON 2 MG/ML
4 INJECTION INTRAMUSCULAR; INTRAVENOUS ONCE AS NEEDED
Status: DISCONTINUED | OUTPATIENT
Start: 2023-05-05 | End: 2023-05-05 | Stop reason: HOSPADM

## 2023-05-05 RX ORDER — SUCRALFATE 1 G/1
1 TABLET ORAL
Status: DISCONTINUED | OUTPATIENT
Start: 2023-05-05 | End: 2023-05-05 | Stop reason: HOSPADM

## 2023-05-05 RX ORDER — PROPOFOL 10 MG/ML
INJECTION, EMULSION INTRAVENOUS AS NEEDED
Status: DISCONTINUED | OUTPATIENT
Start: 2023-05-05 | End: 2023-05-05 | Stop reason: SURG

## 2023-05-05 RX ORDER — PANTOPRAZOLE SODIUM 40 MG/1
40 TABLET, DELAYED RELEASE ORAL
Qty: 60 TABLET | Refills: 1 | Status: SHIPPED | OUTPATIENT
Start: 2023-05-05

## 2023-05-05 RX ORDER — AMLODIPINE BESYLATE 10 MG/1
10 TABLET ORAL
Qty: 30 TABLET | Refills: 0 | Status: SHIPPED | OUTPATIENT
Start: 2023-05-06

## 2023-05-05 RX ORDER — SUCRALFATE 1 G/1
1 TABLET ORAL
Qty: 120 TABLET | Refills: 0 | Status: SHIPPED | OUTPATIENT
Start: 2023-05-05

## 2023-05-05 RX ORDER — PANTOPRAZOLE SODIUM 40 MG/1
40 TABLET, DELAYED RELEASE ORAL
Status: DISCONTINUED | OUTPATIENT
Start: 2023-05-05 | End: 2023-05-05 | Stop reason: HOSPADM

## 2023-05-05 RX ORDER — SODIUM CHLORIDE 9 MG/ML
INJECTION, SOLUTION INTRAVENOUS CONTINUOUS PRN
Status: DISCONTINUED | OUTPATIENT
Start: 2023-05-05 | End: 2023-05-05 | Stop reason: SURG

## 2023-05-05 RX ORDER — SODIUM CHLORIDE 0.9 % (FLUSH) 0.9 %
3-10 SYRINGE (ML) INJECTION AS NEEDED
Status: DISCONTINUED | OUTPATIENT
Start: 2023-05-05 | End: 2023-05-05 | Stop reason: HOSPADM

## 2023-05-05 RX ORDER — POTASSIUM CHLORIDE 20 MEQ/1
40 TABLET, EXTENDED RELEASE ORAL ONCE
Status: COMPLETED | OUTPATIENT
Start: 2023-05-05 | End: 2023-05-05

## 2023-05-05 RX ORDER — SODIUM CHLORIDE 0.9 % (FLUSH) 0.9 %
3 SYRINGE (ML) INJECTION EVERY 12 HOURS SCHEDULED
Status: DISCONTINUED | OUTPATIENT
Start: 2023-05-05 | End: 2023-05-05 | Stop reason: HOSPADM

## 2023-05-05 RX ORDER — DIPHENHYDRAMINE HYDROCHLORIDE 50 MG/ML
12.5 INJECTION INTRAMUSCULAR; INTRAVENOUS
Status: DISCONTINUED | OUTPATIENT
Start: 2023-05-05 | End: 2023-05-05 | Stop reason: HOSPADM

## 2023-05-05 RX ORDER — ONDANSETRON 4 MG/1
4 TABLET, FILM COATED ORAL EVERY 8 HOURS PRN
Qty: 30 TABLET | Refills: 0 | Status: SHIPPED | OUTPATIENT
Start: 2023-05-05

## 2023-05-05 RX ADMIN — LOSARTAN POTASSIUM 100 MG: 50 TABLET, FILM COATED ORAL at 08:47

## 2023-05-05 RX ADMIN — Medication 3 ML: at 13:46

## 2023-05-05 RX ADMIN — ONDANSETRON 4 MG: 2 INJECTION INTRAMUSCULAR; INTRAVENOUS at 13:46

## 2023-05-05 RX ADMIN — PANTOPRAZOLE SODIUM 40 MG: 40 TABLET, DELAYED RELEASE ORAL at 06:25

## 2023-05-05 RX ADMIN — AMLODIPINE BESYLATE 10 MG: 5 TABLET ORAL at 08:46

## 2023-05-05 RX ADMIN — ONDANSETRON 4 MG: 2 INJECTION INTRAMUSCULAR; INTRAVENOUS at 06:25

## 2023-05-05 RX ADMIN — INSULIN LISPRO 1 UNITS: 100 INJECTION, SOLUTION INTRAVENOUS; SUBCUTANEOUS at 16:04

## 2023-05-05 RX ADMIN — METOCLOPRAMIDE HYDROCHLORIDE 10 MG: 5 INJECTION INTRAMUSCULAR; INTRAVENOUS at 08:47

## 2023-05-05 RX ADMIN — SUCRALFATE 1 G: 1 TABLET ORAL at 16:04

## 2023-05-05 RX ADMIN — ONDANSETRON 4 MG: 2 INJECTION INTRAMUSCULAR; INTRAVENOUS at 00:02

## 2023-05-05 RX ADMIN — ROFLUMILAST 500 MCG: 500 TABLET ORAL at 08:47

## 2023-05-05 RX ADMIN — PROPOFOL 200 MG: 10 INJECTION, EMULSION INTRAVENOUS at 12:34

## 2023-05-05 RX ADMIN — TRAMADOL HYDROCHLORIDE 50 MG: 50 TABLET, COATED ORAL at 06:38

## 2023-05-05 RX ADMIN — METOCLOPRAMIDE HYDROCHLORIDE 10 MG: 5 INJECTION INTRAMUSCULAR; INTRAVENOUS at 03:03

## 2023-05-05 RX ADMIN — PREGABALIN 200 MG: 100 CAPSULE ORAL at 08:47

## 2023-05-05 RX ADMIN — SODIUM CHLORIDE: 9 INJECTION, SOLUTION INTRAVENOUS at 12:27

## 2023-05-05 RX ADMIN — BUDESONIDE AND FORMOTEROL FUMARATE DIHYDRATE 2 PUFF: 160; 4.5 AEROSOL RESPIRATORY (INHALATION) at 07:20

## 2023-05-05 RX ADMIN — PANTOPRAZOLE SODIUM 40 MG: 40 TABLET, DELAYED RELEASE ORAL at 16:04

## 2023-05-05 RX ADMIN — Medication 10 ML: at 08:47

## 2023-05-05 RX ADMIN — POTASSIUM CHLORIDE 40 MEQ: 1500 TABLET, EXTENDED RELEASE ORAL at 08:46

## 2023-05-05 RX ADMIN — TRAMADOL HYDROCHLORIDE 50 MG: 50 TABLET, COATED ORAL at 13:46

## 2023-05-05 NOTE — CASE MANAGEMENT/SOCIAL WORK
Continued Stay Note  ALESSANDRO Betancur     Patient Name: Aj Plaza  MRN: [id number]  Today's Date: 5/5/2023    Admit Date: 5/3/2023    Plan: D/C plan: Anticipate home   Discharge Plan     Row Name 05/05/23 1151       Plan    Plan D/C plan: Anticipate home    Patient/Family in Agreement with Plan yes    Plan Comments Barrier to d/c: EGD today, off floor during CM rounds.                   Phone communication or documentation only - no physical contact with patient or family.      Gustavo Villafana RN

## 2023-05-05 NOTE — ANESTHESIA POSTPROCEDURE EVALUATION
Patient: Aj Plaza    Procedure Summary     Date: 05/05/23 Room / Location: [facility] ENDOSCOPY 1 / [facility] ENDOSCOPY    Anesthesia Start: 1227 Anesthesia Stop: 1245    Procedure: ESOPHAGOGASTRODUODENOSCOPY WITH BIOPSY X1 AREA Diagnosis:       Nausea and vomiting, unspecified vomiting type      (Nausea and vomiting, unspecified vomiting type [R11.2])    Surgeons: Antwan Gutierrez MD Provider: Ana Shin MD    Anesthesia Type: general, MAC ASA Status: 3          Anesthesia Type: general, MAC    Vitals  Vitals Value Taken Time   /75 05/05/23 1254   Temp     Pulse 96 05/05/23 1255   Resp 17 05/05/23 1250   SpO2 96 % 05/05/23 1255   Vitals shown include unvalidated device data.        Post Anesthesia Care and Evaluation    Patient location during evaluation: PACU  Patient participation: complete - patient participated  Level of consciousness: awake and alert  Pain management: satisfactory to patient    Airway patency: patent  Anesthetic complications: No anesthetic complications  PONV Status: none  Cardiovascular status: acceptable  Respiratory status: acceptable  Hydration status: acceptable

## 2023-05-05 NOTE — ANESTHESIA PREPROCEDURE EVALUATION
Anesthesia Evaluation     Patient summary reviewed and Nursing notes reviewed   no history of anesthetic complications:  NPO Solid Status: > 8 hours  NPO Liquid Status: > 8 hours           Airway   Mallampati: II  TM distance: >3 FB  Neck ROM: full  No difficulty expected  Dental - normal exam     Pulmonary - normal exam   (+) a smoker Former, COPD,   Cardiovascular - normal exam    (+) hypertension, hyperlipidemia,       Neuro/Psych  GI/Hepatic/Renal/Endo    (+)   diabetes mellitus type 2,     Musculoskeletal     (+) neck stiffness,   Abdominal    Substance History      OB/GYN          Other                        Anesthesia Plan    ASA 3     general and MAC     intravenous induction     Anesthetic plan, risks, benefits, and alternatives have been provided, discussed and informed consent has been obtained with: patient.    Plan discussed with CRNA.        CODE STATUS:    Code Status (Patient has no pulse and is not breathing): CPR (Attempt to Resuscitate)  Medical Interventions (Patient has pulse or is breathing): Full Support

## 2023-05-05 NOTE — DISCHARGE SUMMARY
[facility] Medicine Services  Discharge Summary    Date of Service: 2023  Patient Name: Aj Plaza  : 1975  MRN: [id number]    Date of Admission: 5/3/2023  Discharge Diagnosis: Generalized abdominal pain, peptic ulcer disease  Date of Discharge:  2023  Primary Care Physician: Devi Sumner APRN      Presenting Problem:   Generalized abdominal pain [R10.84]  Umbilical hernia without obstruction and without gangrene [K42.9]  Hypertension, unspecified type [I10]  Nausea and vomiting, unspecified vomiting type [R11.2]    Active and Resolved Hospital Problems:  Active Hospital Problems   No active problems to display.      Resolved Hospital Problems    Diagnosis POA   • **Generalized abdominal pain [R10.84] Yes   • Vomiting [R11.10] Yes         Hospital Course     Hospital Course:  Aj Plaza is a 48 y.o. male with a medical history notable for COPD, type 2 diabetes mellitus, and hypertension who presented to [facility] on 5/3/2023 complaining of abdominal pain.     Patient reported severe lower and epigastric abdominal pain.  He mentioned he tends to get this type of pain once a year.  He has a known ventral hernia that he intermittently has to reduce.  He denied fever, chills, or sick contacts.  He does have associated nausea and vomiting a lot of diarrhea.  His last bowel movement on Tuesday.  Patient denied all use and is a former smoker.  He quit about 8-9 years ago.  No recent changes in medications.  He reports taking his medications as prescribed.     Of note, patient has known history of uncontrolled blood pressure during his past few visits to his doctor's appointment. He denied headache or lightheadedness.     While in the ED, patient received 1L normal saline bolus, Benadryl 25 mg IV, hydralazine 10 mg IV, morphine 4 mg IV, Zofran 4 mg IV, Compazine 10 mg IV, hydromorphone 0.5 mg IV, labetalol 10 mg IV, and losartan 100 mg p.o.     He was admitted.   GI consulted and started him on reglan 10 mg IV 4 times a day.  Possibility of gastroparesis vs cyclic vomiting syndrome since he has had this before.  A1C checked on him and 7.2%.  Underwent EGD 5/5/2023 showing multiple shallow chronic appearing scattered duodenal ulcers throughout the bulb and first and second portions of the duodenum status post biopsy of the antrum for H. Pylori.  He was placed on pantoprazole 40 mg BID and needs minimum of 8 weeks, carafate 1 gm 4 times a day for 4 weeks.  He can follow up with GI outpatient as needed.  Will need to follow up on the biopsies.  He is tolerating his diet ok and can be discharged home today.  His BP was also not well controlled and amlodipine 10 mg daily was added to his losartan 100 mg.  He should continue this at D/C and keep a log at home.  His nausea and vomiting has resolved.  No need for any reglan.        DISCHARGE Follow Up Recommendations for labs and diagnostics: BIOPSY RESULTS FOR H. PYLORI.      Reasons For Change In Medications and Indications for New Medications:      Day of Discharge     Vital Signs:  Temp:  [97.8 °F (36.6 °C)-98.8 °F (37.1 °C)] 97.8 °F (36.6 °C)  Heart Rate:  [] 96  Resp:  [11-22] 18  BP: (117-166)/() 128/87  Flow (L/min):  [5-6] 5    Physical Exam:  Physical Exam  Constitutional:       General: He is not in acute distress.     Appearance: Normal appearance.   HENT:      Head: Normocephalic and atraumatic.      Nose: Nose normal.      Mouth/Throat:      Mouth: Mucous membranes are moist.   Eyes:      Pupils: Pupils are equal, round, and reactive to light.   Cardiovascular:      Rate and Rhythm: Normal rate and regular rhythm.      Pulses: Normal pulses.      Heart sounds: Normal heart sounds.   Pulmonary:      Effort: Pulmonary effort is normal.      Breath sounds: Normal breath sounds.   Abdominal:      General: Bowel sounds are normal.      Palpations: Abdomen is soft.      Tenderness: There is no abdominal  tenderness. There is no guarding.   Musculoskeletal:         General: Normal range of motion.      Cervical back: Normal range of motion.   Skin:     General: Skin is warm.   Neurological:      General: No focal deficit present.      Mental Status: He is alert and oriented to person, place, and time.   Psychiatric:         Mood and Affect: Mood normal.            Pertinent  and/or Most Recent Results     LAB RESULTS:      Lab 05/04/23  2351 05/04/23  0611 05/04/23 0058 05/03/23 2139 05/03/23 2132   WBC 13.20* 14.10*  --   --  16.20*   HEMOGLOBIN 15.3 11.9*  --   --  16.9   HEMATOCRIT 44.2 34.8*  --   --  48.9   PLATELETS 197 166  --   --  246   NEUTROS ABS 10.20* 12.20*  --   --  13.70*   LYMPHS ABS 2.00 1.40  --   --  1.90   MONOS ABS 1.00* 0.50  --   --  0.50   EOS ABS 0.00 0.00  --   --  0.00   MCV 88.0 88.2  --   --  87.8   LACTATE 1.0  --  2.7* 2.4*  --          Lab 05/04/23  2351 05/04/23 0611 05/03/23 2132   SODIUM 139  --  141   POTASSIUM 3.4*  --  4.0   CHLORIDE 103  --  102   CO2 23.0  --  21.0*   ANION GAP 13.0  --  18.0*   BUN 10  --  17   CREATININE 0.74*  --  0.81   EGFR 111.1  --  108.1   GLUCOSE 144*  --  166*   CALCIUM 9.2  --  10.4   MAGNESIUM  --   --  1.7   HEMOGLOBIN A1C  --  7.20*  --          Lab 05/04/23  2351 05/04/23  0214 05/03/23 2132   TOTAL PROTEIN 7.3  --  8.3   ALBUMIN 4.7  --  5.3*   GLOBULIN 2.6  --  3.0   ALT (SGPT) 15  --  23   AST (SGOT) 17  --  22   BILIRUBIN 0.6  --  0.7   ALK PHOS 71  --  86   LIPASE  --  26 48                     Brief Urine Lab Results  (Last result in the past 365 days)      Color   Clarity   Blood   Leuk Est   Nitrite   Protein   CREAT   Urine HCG        05/03/23 2143 Dark Yellow   Clear   Negative   Trace   Negative   100 mg/dL (2+)               Microbiology Results (last 10 days)     ** No results found for the last 240 hours. **          CT Abdomen Pelvis With Contrast    Result Date: 5/4/2023  Impression: 1.  Moderate fat-containing epigastric  ventral hernia. Minor associated fat stranding, while similar to prior exam, raises possibility of incarceration. 2.  Otherwise, no acute abnormality. Electronically signed by:  Vania Sorenson M.D.  5/3/2023 11:14 PM Mountain Time    US Abdomen Limited    Result Date: 5/5/2023  Impression: Impression: Liver has diffuse increased echogenicity and coarsened echotexture. Question hepatic steatosis. Other diffuse hepatocellular disease process less likely. Probable small mount of sludge in the gallbladder but no findings of acute cholecystitis no bile duct dilatation. Electronically Signed: Rosibel Mejia  5/5/2023 3:16 PM EDT  Workstation ID: [id number]                  Labs Pending at Discharge:  Pending Labs     Order Current Status    Tissue Pathology Exam In process          Procedures Performed  Procedure(s):  ESOPHAGOGASTRODUODENOSCOPY WITH BIOPSY X1 AREA  05/05 1219 UPPER GI ENDOSCOPY      Consults:   Consults     Date and Time Order Name Status Description    5/4/2023  8:36 AM Inpatient Gastroenterology Consult Completed     5/4/2023  1:45 AM Hospitalist (on-call MD unless specified)              Discharge Details        Discharge Medications      New Medications      Instructions Start Date   amLODIPine 10 MG tablet  Commonly known as: NORVASC   10 mg, Oral, Every 24 Hours Scheduled   Start Date: May 6, 2023     ondansetron 4 MG tablet  Commonly known as: Zofran   4 mg, Oral, Every 8 Hours PRN      pantoprazole 40 MG EC tablet  Commonly known as: PROTONIX   40 mg, Oral, 2 Times Daily Before Meals      sucralfate 1 g tablet  Commonly known as: CARAFATE   1 g, Oral, 4 Times Daily Before Meals & Nightly         Changes to Medications      Instructions Start Date   Janumet XR  MG tablet  Generic drug: SITagliptin-metFORMIN HCl ER  What changed:   · how much to take  · when to take this   2 tablets, Oral, Daily         Continue These Medications      Instructions Start Date   Accu-Chek Softclix Lancets lancets    1 each, Other, 2 Times Daily, Use to monitor glucose twice daily. E11.9      albuterol sulfate  (90 Base) MCG/ACT inhaler  Commonly known as: PROVENTIL HFA;VENTOLIN HFA;PROAIR HFA   2 puffs, Inhalation, Every 4 Hours PRN      atorvastatin 40 MG tablet  Commonly known as: LIPITOR   40 mg, Oral, Nightly      B-D UF III MINI PEN NEEDLES 31G X 5 MM misc  Generic drug: Insulin Pen Needle   1 each, Subcutaneous, Daily, E11.9      Breztri Aerosphere 160-9-4.8 MCG/ACT aerosol inhaler  Generic drug: Budeson-Glycopyrrol-Formoterol   2 puffs, Inhalation, 2 Times Daily, Rinse mouth after each use      EPINEPHrine 0.3 MG/0.3ML solution auto-injector injection  Commonly known as: EPIPEN   No dose, route, or frequency recorded.      glucose blood test strip   Use to monitor glucose twice daily. Dx: E11.69      glucose monitor monitoring kit   Use to monitor glucose twice a day. Dx: E11.69      Levemir FlexTouch 100 UNIT/ML injection  Generic drug: insulin detemir   20 Units, Subcutaneous, Daily      losartan 100 MG tablet  Commonly known as: COZAAR   100 mg, Oral, Daily      pregabalin 200 MG capsule  Commonly known as: LYRICA   200 mg, Oral, 2 Times Daily      roflumilast 500 MCG tablet tablet  Commonly known as: Daliresp   500 mcg, Oral, Daily      traMADol 50 MG tablet  Commonly known as: ULTRAM   50 mg, Oral, Every 6 Hours PRN             No Known Allergies      Discharge Disposition: HOME  Home or Self Care    Diet:  Hospital:  Diet Order   Procedures   • Diet: Gastrointestinal Diets; Low Irritant; Texture: Regular Texture (IDDSI 7); Fluid Consistency: Thin (IDDSI 0)         Discharge Activity:         CODE STATUS:  Code Status and Medical Interventions:   Ordered at: 05/04/23 0342     Code Status (Patient has no pulse and is not breathing):    CPR (Attempt to Resuscitate)     Medical Interventions (Patient has pulse or is breathing):    Full Support         Future Appointments   Date Time Provider Department Center    5/17/2023  2:40 PM Aaron Mathis MD [facility]   5/22/2023  2:00 PM Devi Sumner APRN [facility]           Time spent on Discharge including face to face service:  36 minutes    This patient has been examined wearing appropriate Personal Protective Equipment and discussed with hospital infection control department. 05/05/23      Signature: Electronically signed by Cm Miller MD, 05/05/23, 16:04 EDT.  [facility] Hospitalist Team

## 2023-05-05 NOTE — OP NOTE
ESOPHAGOGASTRODUODENOSCOPY  Procedure Report    Patient Name:  Aj Plaza  YOB: 1975    Date of Surgery:  5/5/2023     Indications: Epigastric pain  Nausea and vomiting    Pre-op Diagnosis:   Nausea and vomiting, unspecified vomiting type [R11.2]         Post-op Diagnosis:  GERD A  Multiple shallow chronic appearing scattered duodenal ulcers throughout the bulb and first and second portions of the duodenum status post biopsy of the antrum for H. pylori      Procedure(s):  ESOPHAGOGASTRODUODENOSCOPY WITH BIOPSY X1 AREA    Staff:  Surgeon(s):  Antwan Gutierrez MD         Anesthesia: Monitored Anesthesia Care    Estimated Blood Loss: None  Implants:    Nothing was implanted during the procedure    Specimen:          Antral biopsy    Complications:  No immediate    Description of Procedure:  Informed consent was obtained from the patient.  They were placed in the left lateral decubitus position and IV conscious sedation was administered by anesthesia while being monitored continuously throughout the procedure.  The video Olympus endoscope was introduced into the esophagus was advanced under direct vision to the second portion of the duodenum which revealed scattered shallow chronic duodenal ulceration and patchy distribution through the second and first portions of the duodenal sweep.  The duodenal bulb is similarly affected with multiple shallow chronic appearing duodenal ulcers, none with stigmata of bleeding.  Photodocumentation was obtained.  The pylorus is patent.  The antrum shows some mild gastritis and biopsies for H. pylori were obtained.  The stomach body fundus and cardia was otherwise normal including retroflexed views.  There is GERD A at the GE junction consistent with mild reflux and the remaining esophagus was normal.  The scope was removed he tolerated the procedure well returned recovery good condition.    Impression:  Duodenal ulcer disease, this is very likely the  etiology of his epigastric pain and vomiting.    Recommendations:  We will place on pantoprazole 40 mg p.o. twice daily  Add Carafate 1 g 4 times daily ACHS  Discontinue Reglan as vomiting has subsided  Advance to healthy heart diet  Avoid aspirin and NSAIDs  Avoid tobacco and alcohol  If tolerating p.o., he can be discharged home on a minimum of 8 weeks of PPI therapy and 4 weeks of Carafate.  He can follow-up as an outpatient to GI as needed.      Antwan Gutierrez MD     Date: 5/5/2023  Time: 12:46 EDT

## 2023-05-05 NOTE — PLAN OF CARE
Goal Outcome Evaluation:  Plan of Care Reviewed With: patient        Progress: no change   Pt currently resting abed. No complaints or distress noted. VSS will cont to monitor.

## 2023-05-05 NOTE — DISCHARGE INSTRUCTIONS
TAKE AMLODIPINE 10 MG DAILY AND LOSARTAN 100 MG DAILY.  CHECK BP AT HOME.  IF NOTICE TRENDING DOWN CAN CUT THE AMLODIPINE IN HALF TO 5 MG  CONTINUE PPI TWICE A DAY FOR AT LEAST 8 WEEKS AND CARAFATE FOR AT LEAST 4 WEEKS.  YOU HAVE DUODENAL ULCER DISEASE ON EGD  AVOID SPICY FOOD AND FOODS WITH TOO MUCH ACID.    NO ASPIRIN, ALEVE OR MOTRIN FOR PAIN AS THIS CAN CAUSE GI IRRITATION.  TYLENOL IS OK

## 2023-05-06 NOTE — OUTREACH NOTE
Prep Survey    Flowsheet Row Responses   [facility] discharged from? [facility]   Is LACE score < 7 ? No   Eligibility [facility]   [facility]   Date of Admission 05/03/23   Date of Discharge 05/05/23   Discharge Disposition Home or Self Care   Discharge diagnosis generalized abd pain   Does the patient have one of the following disease processes/diagnoses(primary or secondary)? Other   Does the patient have Home health ordered? No   Is there a DME ordered? No   Prep survey completed? Yes          JULIA ST - Registered Nurse

## 2023-05-08 ENCOUNTER — TRANSITIONAL CARE MANAGEMENT TELEPHONE ENCOUNTER (OUTPATIENT)
Dept: CALL CENTER | Facility: HOSPITAL | Age: 48
End: 2023-05-08
Payer: MEDICARE

## 2023-05-08 LAB
LAB AP CASE REPORT: NORMAL
PATH REPORT.FINAL DX SPEC: NORMAL
PATH REPORT.GROSS SPEC: NORMAL

## 2023-05-08 NOTE — OUTREACH NOTE
Call Center TCM Note    Flowsheet Row Responses   [facility] patient discharged from? [facility]   Does the patient have one of the following disease processes/diagnoses(primary or secondary)? Other   TCM attempt successful? Yes   Call start time 1543   Call end time 1547   Discharge diagnosis generalized abd pain   Person spoke with today (if not patient) and relationship pt   Meds reviewed with patient/caregiver? Yes   Is the patient having any side effects they believe may be caused by any medication additions or changes? No   Does the patient have all medications ordered at discharge? Yes   Is the patient taking all medications as directed (includes completed medication regime)? Yes   Comments Needs GI fu appt for 2 weeks   Does the patient have an appointment with their PCP within 7 days of discharge? Yes  [5/9/2023  3:00 PM]   Psychosocial issues? No   Did the patient receive a copy of their discharge instructions? Yes   Nursing interventions Reviewed instructions with patient   What is the patient's perception of their health status since discharge? Improving   Is the patient/caregiver able to teach back signs and symptoms related to disease process for when to call PCP? Yes   Is the patient/caregiver able to teach back signs and symptoms related to disease process for when to call 911? Yes   Is the patient/caregiver able to teach back the hierarchy of who to call/visit for symptoms/problems? PCP, Specialist, Home health nurse, Urgent Care, ED, 911 Yes   If the patient is a current smoker, are they able to teach back resources for cessation? Not a smoker   TCM call completed? Yes   Wrap up additional comments Pt states he is doing better, and reports some stomach pain. Pt verified PCP hospital fu appt on 5/9/23, and needs GI fu appt.   Call end time 1547   Would this patient benefit from a Referral to Amb Social Work? No   Is the patient interested in additional calls from an ambulatory ?  NOTE:   applies to high risk patients requiring additional follow-up. No          Brianne Weber RN    5/8/2023, 15:49 EDT

## 2023-05-08 NOTE — CASE MANAGEMENT/SOCIAL WORK
Case Management Discharge Note      Final Note: Home         Selected Continued Care - Discharged on 5/5/2023 Admission date: 5/3/2023 - Discharge disposition: Home or Self Care            Transportation Services  Private: Car    Final Discharge Disposition Code: 01 - home or self-care

## 2023-05-08 NOTE — OUTREACH NOTE
Call Center TCM Note    Flowsheet Row Responses   [facility] patient discharged from? [facility]   Does the patient have one of the following disease processes/diagnoses(primary or secondary)? Other   TCM attempt successful? No  [julien Whipple]   Unsuccessful attempts Attempt 1   Comments Needs GI fu appt for 2 weeks          Brianne Weber RN    5/8/2023, 14:15 EDT

## 2023-05-09 ENCOUNTER — OFFICE VISIT (OUTPATIENT)
Dept: FAMILY MEDICINE CLINIC | Facility: CLINIC | Age: 48
End: 2023-05-09
Payer: MEDICARE

## 2023-05-09 VITALS
DIASTOLIC BLOOD PRESSURE: 88 MMHG | BODY MASS INDEX: 27.47 KG/M2 | TEMPERATURE: 97.1 F | SYSTOLIC BLOOD PRESSURE: 128 MMHG | OXYGEN SATURATION: 98 % | HEART RATE: 105 BPM | WEIGHT: 202.8 LBS | HEIGHT: 72 IN

## 2023-05-09 DIAGNOSIS — Z09 HOSPITAL DISCHARGE FOLLOW-UP: Primary | ICD-10-CM

## 2023-05-09 NOTE — PROGRESS NOTES
"Chief Complaint  Hospital Follow Up Visit    Subjective        Aj Plaza presents to [facility]  History of Present Illness  Aj is a 48-year-old male presenting today for hospital follow-up.    Hospital Course:  \"Aj Plaza is a 48 y.o. male with a medical history notable for COPD, type 2 diabetes mellitus, and hypertension who presented to [facility] on 5/3/2023 complaining of abdominal pain.     Patient reported severe lower and epigastric abdominal pain.  He mentioned he tends to get this type of pain once a year.  He has a known ventral hernia that he intermittently has to reduce.  He denied fever, chills, or sick contacts.  He does have associated nausea and vomiting a lot of diarrhea.  His last bowel movement on Tuesday.  Patient denied all use and is a former smoker.  He quit about 8-9 years ago.  No recent changes in medications.  He reports taking his medications as prescribed.     Of note, patient has known history of uncontrolled blood pressure during his past few visits to his doctor's appointment. He denied headache or lightheadedness.     While in the ED, patient received 1L normal saline bolus, Benadryl 25 mg IV, hydralazine 10 mg IV, morphine 4 mg IV, Zofran 4 mg IV, Compazine 10 mg IV, hydromorphone 0.5 mg IV, labetalol 10 mg IV, and losartan 100 mg p.o.     He was admitted.  GI consulted and started him on reglan 10 mg IV 4 times a day.  Possibility of gastroparesis vs cyclic vomiting syndrome since he has had this before.  A1C checked on him and 7.2%.  Underwent EGD 5/5/2023 showing multiple shallow chronic appearing scattered duodenal ulcers throughout the bulb and first and second portions of the duodenum status post biopsy of the antrum for H. Pylori.  He was placed on pantoprazole 40 mg BID and needs minimum of 8 weeks, carafate 1 gm 4 times a day for 4 weeks.  He can follow up with GI outpatient as needed.  Will need to follow up on " "the biopsies.  He is tolerating his diet ok and can be discharged home today.  His BP was also not well controlled and amlodipine 10 mg daily was added to his losartan 100 mg.  He should continue this at D/C and keep a log at home.  His nausea and vomiting has resolved.  No need for any reglan.\"    Since being on pantoprazole and carafate he is able to keep food down. Still complaining of abdominal pain though. He is supposed to follow up with GI in two weeks. His blood pressure was also uncontrolled, so they started him on losartan 100mg daily. He has had much improvement with his BP.       The following portions of the patient's history were reviewed and updated as appropriate: allergies, current medications, past family history, past medical history, past social history, past surgical history and problem list.    No Known Allergies    Patient Active Problem List   Diagnosis   • Arthritis   • Contact dermatitis   • Essential hypertension   • Knee pain, left   • Low back pain   • Sciatica of right side   • Spondylosis of lumbar spine   • Type 2 diabetes mellitus, with long-term current use of insulin ([facility])   • Mixed hyperlipidemia   • Diabetic peripheral neuropathy   • Chronic pain syndrome   • Chronic bronchitis       Current Outpatient Medications   Medication Instructions   • Accu-Chek Softclix Lancets lancets 1 each, Other, 2 Times Daily, Use to monitor glucose twice daily. E11.9   • albuterol sulfate  (90 Base) MCG/ACT inhaler 2 puffs, Inhalation, Every 4 Hours PRN   • amLODIPine (NORVASC) 10 mg, Oral, Every 24 Hours Scheduled   • atorvastatin (LIPITOR) 40 mg, Oral, Nightly   • B-D UF III MINI PEN NEEDLES 31G X 5 MM misc 1 each, Subcutaneous, Daily, E11.9   • Budeson-Glycopyrrol-Formoterol (Breztri Aerosphere) 160-9-4.8 MCG/ACT aerosol inhaler 2 puffs, Inhalation, 2 Times Daily, Rinse mouth after each use   • EPINEPHrine (EPIPEN) 0.3 MG/0.3ML solution auto-injector injection No dose, route, or frequency " "recorded.   • glucose blood test strip Use to monitor glucose twice daily. Dx: E11.69   • glucose monitor monitoring kit Use to monitor glucose twice a day. Dx: E11.69   • Janumet XR  MG tablet 2 tablets, Oral, Daily   • Levemir FlexTouch 20 Units, Subcutaneous, Daily   • losartan (COZAAR) 100 mg, Oral, Daily   • ondansetron (ZOFRAN) 4 mg, Oral, Every 8 Hours PRN   • pantoprazole (PROTONIX) 40 mg, Oral, 2 Times Daily Before Meals   • pregabalin (LYRICA) 200 mg, Oral, 2 Times Daily   • roflumilast (DALIRESP) 500 mcg, Oral, Daily   • sucralfate (CARAFATE) 1 g, Oral, 4 Times Daily Before Meals & Nightly   • traMADol (ULTRAM) 50 mg, Oral, Every 6 Hours PRN          Objective   Vital Signs:  /88 (BP Location: Left arm, Patient Position: Sitting, Cuff Size: Large Adult)   Pulse 105   Temp 97.1 °F (36.2 °C) (Temporal)   Ht 182.9 cm (72\")   Wt 92 kg (202 lb 12.8 oz)   SpO2 98%   BMI 27.50 kg/m²   Estimated body mass index is 27.5 kg/m² as calculated from the following:    Height as of this encounter: 182.9 cm (72\").    Weight as of this encounter: 92 kg (202 lb 12.8 oz).             Review of Systems   Constitutional: Negative for appetite change, chills, fatigue, fever and unexpected weight change.   Respiratory: Negative for cough, choking and shortness of breath.    Gastrointestinal: Positive for abdominal pain, nausea and vomiting. Negative for abdominal distention, blood in stool, constipation and diarrhea.   Allergic/Immunologic: Negative for food allergies.        Physical Exam  Constitutional:       Appearance: Normal appearance.   Cardiovascular:      Rate and Rhythm: Normal rate and regular rhythm.      Heart sounds: Normal heart sounds.   Pulmonary:      Effort: Pulmonary effort is normal.      Breath sounds: Normal breath sounds.   Abdominal:      General: Abdomen is flat. Bowel sounds are normal.      Palpations: Abdomen is soft.      Tenderness: There is abdominal tenderness.   Skin:     " General: Skin is warm and dry.   Neurological:      Mental Status: He is alert and oriented to person, place, and time.   Psychiatric:         Mood and Affect: Mood normal.         Behavior: Behavior normal.         Thought Content: Thought content normal.         Judgment: Judgment normal.        Result Review :                   Assessment and Plan   Diagnoses and all orders for this visit:    1. Hospital discharge follow-up (Primary)  Comments:  Continue Protonix and Carafate for ulcers.  Follow-up with GI             Follow Up   Return for Next scheduled follow up.  Patient was given instructions and counseling regarding his condition or for health maintenance advice. Please see specific information pulled into the AVS if appropriate.

## 2023-05-16 ENCOUNTER — READMISSION MANAGEMENT (OUTPATIENT)
Dept: CALL CENTER | Facility: HOSPITAL | Age: 48
End: 2023-05-16
Payer: MEDICARE

## 2023-05-16 NOTE — OUTREACH NOTE
Medical Week 2 Survey    Flowsheet Row Responses   [facility] patient discharged from? [facility]   Does the patient have one of the following disease processes/diagnoses(primary or secondary)? Other   Week 2 attempt successful? Yes   Call start time 1412   Call end time 1415   Meds reviewed with patient/caregiver? Yes   Is the patient having any side effects they believe may be caused by any medication additions or changes? No   Does the patient have all medications ordered at discharge? Yes   Is the patient taking all medications as directed (includes completed medication regime)? Yes   Does the patient have a primary care provider?  Yes   Does the patient have an appointment with their PCP within 7 days of discharge? Yes   Has the patient kept scheduled appointments due by today? Yes   Has home health visited the patient within 72 hours of discharge? N/A   Psychosocial issues? No   Did the patient receive a copy of their discharge instructions? Yes   Nursing interventions Reviewed instructions with patient   What is the patient's perception of their health status since discharge? Improving   Is the patient/caregiver able to teach back signs and symptoms related to disease process for when to call PCP? Yes   Is the patient/caregiver able to teach back signs and symptoms related to disease process for when to call 911? Yes   Is the patient/caregiver able to teach back the hierarchy of who to call/visit for symptoms/problems? PCP, Specialist, Home health nurse, Urgent Care, ED, 911 Yes   Additional teach back comments Eating small amounts of food he says, encouraged clinical dietician.   Week 2 Call Completed? Yes   Is the patient interested in additional calls from an ambulatory ?  NOTE:  applies to high risk patients requiring additional follow-up. No   Wrap up additional comments -[phone]          Erin PAGAN - Registered Nurse

## 2023-05-17 ENCOUNTER — OFFICE VISIT (OUTPATIENT)
Dept: PAIN MEDICINE | Facility: CLINIC | Age: 48
End: 2023-05-17
Payer: MEDICARE

## 2023-05-17 VITALS
RESPIRATION RATE: 16 BRPM | DIASTOLIC BLOOD PRESSURE: 76 MMHG | HEART RATE: 96 BPM | OXYGEN SATURATION: 97 % | SYSTOLIC BLOOD PRESSURE: 129 MMHG

## 2023-05-17 DIAGNOSIS — M51.26 DISPLACEMENT OF LUMBAR INTERVERTEBRAL DISC WITHOUT MYELOPATHY: ICD-10-CM

## 2023-05-17 DIAGNOSIS — G58.8 PUDENDAL NEURALGIA: Primary | ICD-10-CM

## 2023-05-17 DIAGNOSIS — E11.42 DIABETIC POLYNEUROPATHY ASSOCIATED WITH TYPE 2 DIABETES MELLITUS: ICD-10-CM

## 2023-05-17 RX ORDER — TRAMADOL HYDROCHLORIDE 50 MG/1
50 TABLET ORAL EVERY 6 HOURS PRN
Qty: 120 TABLET | Refills: 0 | Status: SHIPPED | OUTPATIENT
Start: 2023-07-22

## 2023-05-17 RX ORDER — TRAMADOL HYDROCHLORIDE 50 MG/1
50 TABLET ORAL EVERY 6 HOURS PRN
Qty: 120 TABLET | Refills: 0 | Status: SHIPPED | OUTPATIENT
Start: 2023-06-24

## 2023-05-17 RX ORDER — AMOXICILLIN AND CLAVULANATE POTASSIUM 500; 125 MG/1; MG/1
1 TABLET, FILM COATED ORAL 3 TIMES DAILY
COMMUNITY
Start: 2023-05-10

## 2023-05-17 RX ORDER — PREGABALIN 200 MG/1
200 CAPSULE ORAL 2 TIMES DAILY
Qty: 60 CAPSULE | Refills: 2 | Status: SHIPPED | OUTPATIENT
Start: 2023-05-17

## 2023-05-17 RX ORDER — TRAMADOL HYDROCHLORIDE 50 MG/1
50 TABLET ORAL EVERY 6 HOURS PRN
Qty: 120 TABLET | Refills: 0 | Status: SHIPPED | OUTPATIENT
Start: 2023-05-26

## 2023-05-17 NOTE — PROGRESS NOTES
Patient screened positive for depression based on a PHQ-9 score of 0 on 5/17/2023. Follow-up recommendations include: Elevated PHQ score reflective of acute illness, not depression     CHIEF COMPLAINT  Chief Complaint   Patient presents with   • Neuralgia     3 month f/u  CC  Pudendal neuralgia  neck, mid back, low back, knees  Treated w/Tramadol 05/17 @ 9:30 am        Primary Care  Devi Sumner APRN Subjective   Aj Plaza is a 48 y.o. male  who presents for generalized pain.  He states that he has had pain in the neck, mid back, low back, knees for many years.  It appears that he was previously being treated at an outside pain clinic approximately 10 years ago with a combination of narcotics however he is no longer at the clinic for unknown reasons.  He states that most recently, he was smoking marijuana for pain control however he has subsequently developed lung issues and can no longer smoke marijuana.  He presents today to establish care.  He describes pain essentially all over his body.  He cannot identify any specific areas of pain which are of more concern than the others.    Back Pain  Associated symptoms include numbness. Pertinent negatives include no weakness.   Pain  Associated symptoms include arthralgias, myalgias, neck pain and numbness. Pertinent negatives include no weakness.   Neck Pain   Associated symptoms include numbness. Pertinent negatives include no weakness.   Knee Pain   Associated symptoms include numbness.        Location: Generalized pain  Onset: Many years ago  Duration: Progressively worsening  Timing: Constant throughout the day  Quality: Numbness and tingling in the hands with sharp, stabbing pains in the legs and feet  Severity: Today: 5       Last Week: 6       Worst: 8  Modifying Factors: The pain is fairly constant without any exacerbating or relieving factors    Physical Therapy: no    Interval Update 05/17/2023: Continues with benefit with tramadol and Lyrica    The  following portions of the patient's history were reviewed and updated as appropriate: allergies, current medications, past family history, past medical history, past social history, past surgical history and problem list.      Current Outpatient Medications:   •  Accu-Chek Softclix Lancets lancets, 1 each by Other route 2 (Two) Times a Day. Use to monitor glucose twice daily. E11.9, Disp: 100 each, Rfl: 2  •  albuterol sulfate  (90 Base) MCG/ACT inhaler, Inhale 2 puffs Every 4 (Four) Hours As Needed for Wheezing., Disp: 6.7 g, Rfl: 2  •  amLODIPine (NORVASC) 10 MG tablet, Take 1 tablet by mouth Daily., Disp: 30 tablet, Rfl: 0  •  amoxicillin-clavulanate (AUGMENTIN) 500-125 MG per tablet, Take 1 tablet by mouth 3 (Three) Times a Day., Disp: , Rfl:   •  atorvastatin (LIPITOR) 40 MG tablet, Take 1 tablet by mouth Every Night., Disp: 30 tablet, Rfl: 2  •  B-D UF III MINI PEN NEEDLES 31G X 5 MM misc, Inject 1 each under the skin into the appropriate area as directed Daily. E11.9, Disp: 100 each, Rfl: 2  •  Budeson-Glycopyrrol-Formoterol (Breztri Aerosphere) 160-9-4.8 MCG/ACT aerosol inhaler, Inhale 2 puffs 2 (Two) Times a Day. Rinse mouth after each use, Disp: 10.7 g, Rfl: 2  •  EPINEPHrine (EPIPEN) 0.3 MG/0.3ML solution auto-injector injection, , Disp: , Rfl:   •  glucose blood test strip, Use to monitor glucose twice daily. Dx: E11.69, Disp: 100 each, Rfl: 3  •  glucose monitor monitoring kit, Use to monitor glucose twice a day. Dx: E11.69, Disp: 1 each, Rfl: 0  •  insulin detemir (Levemir FlexTouch) 100 UNIT/ML injection, Inject 20 Units under the skin into the appropriate area as directed Daily., Disp: 18 mL, Rfl: 2  •  Janumet XR  MG tablet, TAKE 2 TABLETS BY MOUTH DAILY (Patient taking differently: Take 1 tablet by mouth 2 (Two) Times a Day.), Disp: 60 tablet, Rfl: 2  •  losartan (COZAAR) 100 MG tablet, Take 1 tablet by mouth Daily., Disp: 30 tablet, Rfl: 2  •  ondansetron (Zofran) 4 MG tablet, Take 1  tablet by mouth Every 8 (Eight) Hours As Needed for Nausea or Vomiting., Disp: 30 tablet, Rfl: 0  •  pantoprazole (PROTONIX) 40 MG EC tablet, Take 1 tablet by mouth 2 (Two) Times a Day Before Meals., Disp: 60 tablet, Rfl: 1  •  pregabalin (LYRICA) 200 MG capsule, Take 1 capsule by mouth 2 (Two) Times a Day., Disp: 60 capsule, Rfl: 2  •  roflumilast (Daliresp) 500 MCG tablet tablet, Take 1 tablet by mouth Daily., Disp: 30 tablet, Rfl: 2  •  sucralfate (CARAFATE) 1 g tablet, Take 1 tablet by mouth 4 (Four) Times a Day Before Meals & at Bedtime., Disp: 120 tablet, Rfl: 0  •  [START ON 7/22/2023] traMADol (ULTRAM) 50 MG tablet, Take 1 tablet by mouth Every 6 (Six) Hours As Needed for Moderate Pain., Disp: 120 tablet, Rfl: 0  •  [START ON 6/24/2023] traMADol (ULTRAM) 50 MG tablet, Take 1 tablet by mouth Every 6 (Six) Hours As Needed for Moderate Pain., Disp: 120 tablet, Rfl: 0  •  [START ON 5/26/2023] traMADol (ULTRAM) 50 MG tablet, Take 1 tablet by mouth Every 6 (Six) Hours As Needed for Moderate Pain., Disp: 120 tablet, Rfl: 0    Review of Systems   Musculoskeletal: Positive for arthralgias, back pain, myalgias and neck pain. Negative for gait problem and neck stiffness.   Neurological: Positive for numbness. Negative for weakness.       Vitals:    05/17/23 1436   BP: 129/76   Pulse: 96   Resp: 16   SpO2: 97%   PainSc:   5       Urine Drug Screen: 3/16/2022  Appropriate: Positive for marijuana    Objective   Physical Exam  Vitals and nursing note reviewed. Exam conducted with a chaperone present.   Constitutional:       General: He is not in acute distress.     Appearance: Normal appearance. He is normal weight.   Neurological:      General: No focal deficit present.      Mental Status: He is alert. Mental status is at baseline.      Sensory: No sensory deficit.      Motor: No weakness.           Assessment & Plan   Problems Addressed this Visit    None  Visit Diagnoses     Pudendal neuralgia    -  Primary    Diabetic  polyneuropathy associated with type 2 diabetes mellitus        Relevant Medications    pregabalin (LYRICA) 200 MG capsule    traMADol (ULTRAM) 50 MG tablet (Start on 7/22/2023)    traMADol (ULTRAM) 50 MG tablet (Start on 6/24/2023)    traMADol (ULTRAM) 50 MG tablet (Start on 5/26/2023)    Displacement of lumbar intervertebral disc without myelopathy          Diagnoses       Codes Comments    Pudendal neuralgia    -  Primary ICD-10-CM: G58.8  ICD-9-CM: 355.8     Diabetic polyneuropathy associated with type 2 diabetes mellitus     ICD-10-CM: E11.42  ICD-9-CM: 250.60, 357.2     Displacement of lumbar intervertebral disc without myelopathy     ICD-10-CM: M51.26  ICD-9-CM: 722.10           Plan:  1. Continue tramadol 50 mg 4 times daily  2. Increase Lyrica to 200 mg twice daily  3. Inspect appropriate.  Did have trace THC on UDS  --- Follow-up 3 months           INSPECT REPORT    As part of the patient's treatment plan, I may be prescribing controlled substances. The patient has been made aware of appropriate use of such medications, including potential risk of somnolence, limited ability to drive and/or work safely, and the potential for dependence or overdose. It has also bee made clear that these medications are for use by this patient only, without concomitant use of alcohol or other substances unless prescribed.     Patient has completed prescribing agreement detailing terms of continued prescribing of controlled substances, including monitoring [facility] reports, urine drug screening, and pill counts if necessary. The patient is aware that inappropriate use will results in cessation of prescribing such medications.    INSPECT report has been reviewed and scanned into the patient's chart.    As the clinician, I personally reviewed the INSPECT from 5/16/2023.    History and physical exam exhibit continued safe and appropriate use of controlled substances.      EMR Dragon/Transcription disclaimer:   Much of this  encounter note is an electronic transcription/translation of spoken language to printed text. The electronic translation of spoken language may permit erroneous, or at times, nonsensical words or phrases to be inadvertently transcribed; Although I have reviewed the note for such errors, some may still exist.       .

## 2023-05-25 DIAGNOSIS — E11.69 TYPE 2 DIABETES MELLITUS WITH OTHER SPECIFIED COMPLICATION, WITH LONG-TERM CURRENT USE OF INSULIN: ICD-10-CM

## 2023-05-25 DIAGNOSIS — I10 ESSENTIAL HYPERTENSION: ICD-10-CM

## 2023-05-25 DIAGNOSIS — Z79.4 TYPE 2 DIABETES MELLITUS WITH OTHER SPECIFIED COMPLICATION, WITH LONG-TERM CURRENT USE OF INSULIN: ICD-10-CM

## 2023-05-26 RX ORDER — LOSARTAN POTASSIUM 100 MG/1
100 TABLET ORAL DAILY
Qty: 30 TABLET | Refills: 2 | Status: SHIPPED | OUTPATIENT
Start: 2023-05-26

## 2023-06-02 DIAGNOSIS — E78.2 MIXED HYPERLIPIDEMIA: ICD-10-CM

## 2023-06-05 RX ORDER — ATORVASTATIN CALCIUM 40 MG/1
40 TABLET, FILM COATED ORAL NIGHTLY
Qty: 30 TABLET | Refills: 2 | OUTPATIENT
Start: 2023-06-05

## 2023-06-05 RX ORDER — ATORVASTATIN CALCIUM 40 MG/1
40 TABLET, FILM COATED ORAL NIGHTLY
Qty: 30 TABLET | Refills: 2 | Status: SHIPPED | OUTPATIENT
Start: 2023-06-05

## 2023-07-27 DIAGNOSIS — E11.69 TYPE 2 DIABETES MELLITUS WITH OTHER SPECIFIED COMPLICATION, WITH LONG-TERM CURRENT USE OF INSULIN: ICD-10-CM

## 2023-07-27 DIAGNOSIS — Z79.4 TYPE 2 DIABETES MELLITUS WITH OTHER SPECIFIED COMPLICATION, WITH LONG-TERM CURRENT USE OF INSULIN: ICD-10-CM

## 2023-07-28 RX ORDER — SITAGLIPTIN AND METFORMIN HYDROCHLORIDE 1000; 50 MG/1; MG/1
2 TABLET, FILM COATED, EXTENDED RELEASE ORAL DAILY
Qty: 180 TABLET | Refills: 0 | Status: SHIPPED | OUTPATIENT
Start: 2023-07-28

## 2023-08-16 ENCOUNTER — OFFICE VISIT (OUTPATIENT)
Dept: PAIN MEDICINE | Facility: CLINIC | Age: 48
End: 2023-08-16
Payer: MEDICARE

## 2023-08-16 VITALS
DIASTOLIC BLOOD PRESSURE: 74 MMHG | HEART RATE: 79 BPM | RESPIRATION RATE: 16 BRPM | OXYGEN SATURATION: 98 % | SYSTOLIC BLOOD PRESSURE: 128 MMHG

## 2023-08-16 DIAGNOSIS — E11.42 DIABETIC POLYNEUROPATHY ASSOCIATED WITH TYPE 2 DIABETES MELLITUS: ICD-10-CM

## 2023-08-16 DIAGNOSIS — G58.8 PUDENDAL NEURALGIA: Primary | ICD-10-CM

## 2023-08-16 RX ORDER — PREGABALIN 200 MG/1
200 CAPSULE ORAL 2 TIMES DAILY
Qty: 60 CAPSULE | Refills: 2 | Status: SHIPPED | OUTPATIENT
Start: 2023-08-16

## 2023-08-16 RX ORDER — TRAMADOL HYDROCHLORIDE 50 MG/1
50 TABLET ORAL EVERY 6 HOURS PRN
Qty: 120 TABLET | Refills: 0 | Status: SHIPPED | OUTPATIENT
Start: 2023-09-22

## 2023-08-16 RX ORDER — TRAMADOL HYDROCHLORIDE 50 MG/1
50 TABLET ORAL EVERY 6 HOURS PRN
Qty: 120 TABLET | Refills: 0 | Status: SHIPPED | OUTPATIENT
Start: 2023-08-23

## 2023-08-16 RX ORDER — TRAMADOL HYDROCHLORIDE 50 MG/1
50 TABLET ORAL EVERY 6 HOURS PRN
Qty: 120 TABLET | Refills: 0 | Status: SHIPPED | OUTPATIENT
Start: 2023-10-21

## 2023-08-16 NOTE — PROGRESS NOTES
Patient screened positive for depression based on a PHQ-9 score of 0 on 8/16/2023. Follow-up recommendations include: Elevated PHQ score reflective of acute illness, not depression     CHIEF COMPLAINT  Chief Complaint   Patient presents with    Neuralgia     3 month f/u  CC  Pudendal neuralgia, lower back Treated w/Tramadol LD @ 1:30 pm        Primary Care  Eufemia Malcolm PA    Subjective   Aj Plaza is a 48 y.o. male  who presents for generalized pain.  He states that he has had pain in the neck, mid back, low back, knees for many years.  It appears that he was previously being treated at an outside pain clinic approximately 10 years ago with a combination of narcotics however he is no longer at the clinic for unknown reasons.  He states that most recently, he was smoking marijuana for pain control however he has subsequently developed lung issues and can no longer smoke marijuana.  He presents today to establish care.  He describes pain essentially all over his body.  He cannot identify any specific areas of pain which are of more concern than the others.    Back Pain  Associated symptoms include numbness. Pertinent negatives include no weakness.   Pain  Associated symptoms include arthralgias, myalgias, neck pain and numbness. Pertinent negatives include no weakness.   Neck Pain   Associated symptoms include numbness. Pertinent negatives include no weakness.   Knee Pain   Associated symptoms include numbness.      Location: Generalized pain  Onset: Many years ago  Duration: Progressively worsening  Timing: Constant throughout the day  Quality: Numbness and tingling in the hands with sharp, stabbing pains in the legs and feet  Severity: Today: 5       Last Week: 6       Worst: 8  Modifying Factors: The pain is fairly constant without any exacerbating or relieving factors    Physical Therapy: no    Interval Update 08/16/2023: Continues with benefit with tramadol and Lyrica.  Overall, relatively  unchanged    The following portions of the patient's history were reviewed and updated as appropriate: allergies, current medications, past family history, past medical history, past social history, past surgical history and problem list.      Current Outpatient Medications:     Accu-Chek Softclix Lancets lancets, 1 each by Other route 2 (Two) Times a Day. Use to monitor glucose twice daily. E11.9, Disp: 100 each, Rfl: 2    albuterol sulfate  (90 Base) MCG/ACT inhaler, Inhale 2 puffs Every 4 (Four) Hours As Needed for Wheezing., Disp: 6.7 g, Rfl: 2    amLODIPine (NORVASC) 10 MG tablet, Take 1 tablet by mouth Daily., Disp: 30 tablet, Rfl: 0    atorvastatin (LIPITOR) 40 MG tablet, TAKE 1 TABLET BY MOUTH EVERY NIGHT, Disp: 30 tablet, Rfl: 2    B-D UF III MINI PEN NEEDLES 31G X 5 MM misc, Inject 1 each under the skin into the appropriate area as directed Daily. E11.9, Disp: 100 each, Rfl: 2    Budeson-Glycopyrrol-Formoterol (Breztri Aerosphere) 160-9-4.8 MCG/ACT aerosol inhaler, Inhale 2 puffs 2 (Two) Times a Day. Rinse mouth after each use, Disp: 10.7 g, Rfl: 2    EPINEPHrine (EPIPEN) 0.3 MG/0.3ML solution auto-injector injection, , Disp: , Rfl:     glucose blood test strip, Use to monitor glucose twice daily. Dx: E11.69, Disp: 100 each, Rfl: 3    glucose monitor monitoring kit, Use to monitor glucose twice a day. Dx: E11.69, Disp: 1 each, Rfl: 0    insulin detemir (Levemir FlexTouch) 100 UNIT/ML injection, Inject 20 Units under the skin into the appropriate area as directed Daily., Disp: 18 mL, Rfl: 2    losartan (COZAAR) 100 MG tablet, TAKE 1 TABLET BY MOUTH DAILY, Disp: 30 tablet, Rfl: 2    ondansetron (Zofran) 4 MG tablet, Take 1 tablet by mouth Every 8 (Eight) Hours As Needed for Nausea or Vomiting., Disp: 30 tablet, Rfl: 0    pantoprazole (PROTONIX) 40 MG EC tablet, Take 1 tablet by mouth 2 (Two) Times a Day Before Meals., Disp: 60 tablet, Rfl: 1    pregabalin (LYRICA) 200 MG capsule, Take 1 capsule by  mouth 2 (Two) Times a Day., Disp: 60 capsule, Rfl: 2    roflumilast (DALIRESP) 500 MCG tablet tablet, TAKE 1 TABLET BY MOUTH DAILY, Disp: 30 tablet, Rfl: 2    SITagliptin-metFORMIN HCl ER (Janumet XR)  MG tablet, TAKE 2 TABLETS BY MOUTH DAILY, Disp: 180 tablet, Rfl: 0    [START ON 10/21/2023] traMADol (ULTRAM) 50 MG tablet, Take 1 tablet by mouth Every 6 (Six) Hours As Needed for Moderate Pain., Disp: 120 tablet, Rfl: 0    [START ON 9/22/2023] traMADol (ULTRAM) 50 MG tablet, Take 1 tablet by mouth Every 6 (Six) Hours As Needed for Moderate Pain., Disp: 120 tablet, Rfl: 0    [START ON 8/23/2023] traMADol (ULTRAM) 50 MG tablet, Take 1 tablet by mouth Every 6 (Six) Hours As Needed for Moderate Pain., Disp: 120 tablet, Rfl: 0    Review of Systems   Musculoskeletal:  Positive for arthralgias, back pain, myalgias and neck pain. Negative for gait problem and neck stiffness.   Neurological:  Positive for numbness. Negative for weakness.     Vitals:    08/16/23 1433   BP: 128/74   BP Location: Right arm   Patient Position: Sitting   Cuff Size: Adult   Pulse: 79   Resp: 16   SpO2: 98%   PainSc:   6   PainLoc: Back       Urine Drug Screen: 5/4/2023  Appropriate: Positive for marijuana    Objective   Physical Exam  Vitals and nursing note reviewed. Exam conducted with a chaperone present.   Constitutional:       General: He is not in acute distress.     Appearance: Normal appearance. He is normal weight.   Neurological:      General: No focal deficit present.      Mental Status: He is alert. Mental status is at baseline.      Sensory: No sensory deficit.      Motor: No weakness.         Assessment & Plan   Problems Addressed this Visit    None  Visit Diagnoses       Pudendal neuralgia    -  Primary    Diabetic polyneuropathy associated with type 2 diabetes mellitus        Relevant Medications    pregabalin (LYRICA) 200 MG capsule    traMADol (ULTRAM) 50 MG tablet (Start on 10/21/2023)    traMADol (ULTRAM) 50 MG tablet  (Start on 9/22/2023)    traMADol (ULTRAM) 50 MG tablet (Start on 8/23/2023)          Diagnoses         Codes Comments    Pudendal neuralgia    -  Primary ICD-10-CM: G58.8  ICD-9-CM: 355.8     Diabetic polyneuropathy associated with type 2 diabetes mellitus     ICD-10-CM: E11.42  ICD-9-CM: 250.60, 357.2             Plan:  Continue tramadol 50 mg 4 times daily  Continue Lyrica 200 mg twice daily  Inspect appropriate.  Did have trace THC on UDS  --- Follow-up 3 months           INSPECT REPORT    As part of the patient's treatment plan, I may be prescribing controlled substances. The patient has been made aware of appropriate use of such medications, including potential risk of somnolence, limited ability to drive and/or work safely, and the potential for dependence or overdose. It has also bee made clear that these medications are for use by this patient only, without concomitant use of alcohol or other substances unless prescribed.     Patient has completed prescribing agreement detailing terms of continued prescribing of controlled substances, including monitoring [facility] reports, urine drug screening, and pill counts if necessary. The patient is aware that inappropriate use will results in cessation of prescribing such medications.    INSPECT report has been reviewed and scanned into the patient's chart.    As the clinician, I personally reviewed the INSPECT from 8/15/2023.    History and physical exam exhibit continued safe and appropriate use of controlled substances.      EMR Dragon/Transcription disclaimer:   Much of this encounter note is an electronic transcription/translation of spoken language to printed text. The electronic translation of spoken language may permit erroneous, or at times, nonsensical words or phrases to be inadvertently transcribed; Although I have reviewed the note for such errors, some may still exist.       .

## 2023-08-24 DIAGNOSIS — Z79.4 TYPE 2 DIABETES MELLITUS WITH OTHER SPECIFIED COMPLICATION, WITH LONG-TERM CURRENT USE OF INSULIN: ICD-10-CM

## 2023-08-24 DIAGNOSIS — I10 ESSENTIAL HYPERTENSION: ICD-10-CM

## 2023-08-24 DIAGNOSIS — E11.69 TYPE 2 DIABETES MELLITUS WITH OTHER SPECIFIED COMPLICATION, WITH LONG-TERM CURRENT USE OF INSULIN: ICD-10-CM

## 2023-08-24 DIAGNOSIS — E78.2 MIXED HYPERLIPIDEMIA: ICD-10-CM

## 2023-08-24 RX ORDER — LOSARTAN POTASSIUM 100 MG/1
100 TABLET ORAL DAILY
Qty: 30 TABLET | Refills: 2 | OUTPATIENT
Start: 2023-08-24

## 2023-08-24 RX ORDER — ATORVASTATIN CALCIUM 40 MG/1
40 TABLET, FILM COATED ORAL NIGHTLY
Qty: 30 TABLET | Refills: 2 | OUTPATIENT
Start: 2023-08-24

## 2023-09-26 ENCOUNTER — APPOINTMENT (OUTPATIENT)
Dept: CT IMAGING | Facility: HOSPITAL | Age: 48
End: 2023-09-26
Payer: MEDICARE

## 2023-09-26 ENCOUNTER — HOSPITAL ENCOUNTER (EMERGENCY)
Facility: HOSPITAL | Age: 48
Discharge: HOME | End: 2023-09-27
Attending: EMERGENCY MEDICINE | Admitting: EMERGENCY MEDICINE
Payer: MEDICARE

## 2023-09-26 VITALS
BODY MASS INDEX: 25.73 KG/M2 | SYSTOLIC BLOOD PRESSURE: 136 MMHG | RESPIRATION RATE: 16 BRPM | TEMPERATURE: 97.7 F | DIASTOLIC BLOOD PRESSURE: 88 MMHG | HEART RATE: 77 BPM | OXYGEN SATURATION: 98 % | WEIGHT: 190 LBS | HEIGHT: 72 IN

## 2023-09-26 DIAGNOSIS — R10.9 ABDOMINAL PAIN, UNSPECIFIED ABDOMINAL LOCATION: Primary | ICD-10-CM

## 2023-09-26 LAB
ALBUMIN SERPL-MCNC: 4.7 G/DL (ref 3.5–5.2)
ALBUMIN/GLOB SERPL: 2 G/DL
ALP SERPL-CCNC: 61 U/L (ref 39–117)
ALT SERPL W P-5'-P-CCNC: 13 U/L (ref 1–41)
ANION GAP SERPL CALCULATED.3IONS-SCNC: 13 MMOL/L (ref 5–15)
AST SERPL-CCNC: 18 U/L (ref 1–40)
BASOPHILS # BLD AUTO: 0 10*3/MM3 (ref 0–0.2)
BASOPHILS NFR BLD AUTO: 0.4 % (ref 0–1.5)
BILIRUB SERPL-MCNC: 0.7 MG/DL (ref 0–1.2)
BILIRUB UR QL STRIP: NEGATIVE
BUN SERPL-MCNC: 16 MG/DL (ref 6–20)
BUN/CREAT SERPL: 19.3 (ref 7–25)
CALCIUM SPEC-SCNC: 9.6 MG/DL (ref 8.6–10.5)
CHLORIDE SERPL-SCNC: 103 MMOL/L (ref 98–107)
CLARITY UR: CLEAR
CO2 SERPL-SCNC: 23 MMOL/L (ref 22–29)
COLOR UR: YELLOW
CREAT SERPL-MCNC: 0.83 MG/DL (ref 0.76–1.27)
DEPRECATED RDW RBC AUTO: 38.1 FL (ref 37–54)
EGFRCR SERPLBLD CKD-EPI 2021: 107.3 ML/MIN/1.73
EOSINOPHIL # BLD AUTO: 0.1 10*3/MM3 (ref 0–0.4)
EOSINOPHIL NFR BLD AUTO: 0.6 % (ref 0.3–6.2)
ERYTHROCYTE [DISTWIDTH] IN BLOOD BY AUTOMATED COUNT: 12.5 % (ref 12.3–15.4)
GLOBULIN UR ELPH-MCNC: 2.3 GM/DL
GLUCOSE BLDC GLUCOMTR-MCNC: 162 MG/DL (ref 70–105)
GLUCOSE SERPL-MCNC: 149 MG/DL (ref 65–99)
GLUCOSE UR STRIP-MCNC: NEGATIVE MG/DL
HCT VFR BLD AUTO: 44.4 % (ref 37.5–51)
HGB BLD-MCNC: 15.2 G/DL (ref 13–17.7)
HGB UR QL STRIP.AUTO: NEGATIVE
HOLD SPECIMEN: NORMAL
KETONES UR QL STRIP: ABNORMAL
LEUKOCYTE ESTERASE UR QL STRIP.AUTO: NEGATIVE
LIPASE SERPL-CCNC: 16 U/L (ref 13–60)
LYMPHOCYTES # BLD AUTO: 0.9 10*3/MM3 (ref 0.7–3.1)
LYMPHOCYTES NFR BLD AUTO: 9.8 % (ref 19.6–45.3)
MCH RBC QN AUTO: 30.8 PG (ref 26.6–33)
MCHC RBC AUTO-ENTMCNC: 34.3 G/DL (ref 31.5–35.7)
MCV RBC AUTO: 90.1 FL (ref 79–97)
MONOCYTES # BLD AUTO: 0.3 10*3/MM3 (ref 0.1–0.9)
MONOCYTES NFR BLD AUTO: 3.7 % (ref 5–12)
NEUTROPHILS NFR BLD AUTO: 7.5 10*3/MM3 (ref 1.7–7)
NEUTROPHILS NFR BLD AUTO: 85.5 % (ref 42.7–76)
NITRITE UR QL STRIP: NEGATIVE
NRBC BLD AUTO-RTO: 0 /100 WBC (ref 0–0.2)
PH UR STRIP.AUTO: 7.5 [PH] (ref 5–8)
PLATELET # BLD AUTO: 184 10*3/MM3 (ref 140–450)
PMV BLD AUTO: 8.2 FL (ref 6–12)
POTASSIUM SERPL-SCNC: 3.8 MMOL/L (ref 3.5–5.2)
PROT SERPL-MCNC: 7 G/DL (ref 6–8.5)
PROT UR QL STRIP: ABNORMAL
RBC # BLD AUTO: 4.93 10*6/MM3 (ref 4.14–5.8)
SODIUM SERPL-SCNC: 139 MMOL/L (ref 136–145)
SP GR UR STRIP: 1.03 (ref 1–1.03)
UROBILINOGEN UR QL STRIP: ABNORMAL
WBC NRBC COR # BLD: 8.8 10*3/MM3 (ref 3.4–10.8)
WHOLE BLOOD HOLD COAG: NORMAL

## 2023-09-26 PROCEDURE — 96374 THER/PROPH/DIAG INJ IV PUSH: CPT

## 2023-09-26 PROCEDURE — 82948 REAGENT STRIP/BLOOD GLUCOSE: CPT

## 2023-09-26 PROCEDURE — 25010000002 ONDANSETRON PER 1 MG: Performed by: EMERGENCY MEDICINE

## 2023-09-26 PROCEDURE — 85025 COMPLETE CBC W/AUTO DIFF WBC: CPT | Performed by: EMERGENCY MEDICINE

## 2023-09-26 PROCEDURE — 25510000001 IOPAMIDOL PER 1 ML: Performed by: EMERGENCY MEDICINE

## 2023-09-26 PROCEDURE — 96375 TX/PRO/DX INJ NEW DRUG ADDON: CPT

## 2023-09-26 PROCEDURE — 83690 ASSAY OF LIPASE: CPT | Performed by: EMERGENCY MEDICINE

## 2023-09-26 PROCEDURE — 81003 URINALYSIS AUTO W/O SCOPE: CPT | Performed by: EMERGENCY MEDICINE

## 2023-09-26 PROCEDURE — 74177 CT ABD & PELVIS W/CONTRAST: CPT

## 2023-09-26 PROCEDURE — 80053 COMPREHEN METABOLIC PANEL: CPT | Performed by: EMERGENCY MEDICINE

## 2023-09-26 PROCEDURE — 99285 EMERGENCY DEPT VISIT HI MDM: CPT

## 2023-09-26 PROCEDURE — 25010000002 HYDROMORPHONE 1 MG/ML SOLUTION: Performed by: EMERGENCY MEDICINE

## 2023-09-26 RX ORDER — ONDANSETRON 2 MG/ML
4 INJECTION INTRAMUSCULAR; INTRAVENOUS ONCE
Status: COMPLETED | OUTPATIENT
Start: 2023-09-26 | End: 2023-09-26

## 2023-09-26 RX ORDER — SODIUM CHLORIDE 0.9 % (FLUSH) 0.9 %
10 SYRINGE (ML) INJECTION AS NEEDED
Status: DISCONTINUED | OUTPATIENT
Start: 2023-09-26 | End: 2023-09-27 | Stop reason: HOSPADM

## 2023-09-26 RX ADMIN — HYDROMORPHONE HYDROCHLORIDE 1 MG: 1 INJECTION, SOLUTION INTRAMUSCULAR; INTRAVENOUS; SUBCUTANEOUS at 22:22

## 2023-09-26 RX ADMIN — IOPAMIDOL 100 ML: 755 INJECTION, SOLUTION INTRAVENOUS at 22:38

## 2023-09-26 RX ADMIN — SODIUM CHLORIDE 1000 ML: 9 INJECTION, SOLUTION INTRAVENOUS at 22:22

## 2023-09-26 RX ADMIN — ONDANSETRON 4 MG: 2 INJECTION INTRAMUSCULAR; INTRAVENOUS at 22:22

## 2023-09-27 RX ORDER — ONDANSETRON 4 MG/1
4 TABLET, ORALLY DISINTEGRATING ORAL EVERY 8 HOURS PRN
Qty: 10 TABLET | Refills: 0 | Status: SHIPPED | OUTPATIENT
Start: 2023-09-27

## 2023-09-27 NOTE — ED NOTES
Pt in ER with c/o abdominal pain, vomiting and diarrhea for the past 5 days.  Pt reports he is unable to keep anything down.  Pt denies any CP, SOA, HA, fever or constipation.  Pt is A/O x4 and ambulatory.  No s/s of distress.  RR even and unlabored.

## 2023-09-27 NOTE — ED PROVIDER NOTES
Subjective   History of Present Illness  Chief complaint: Abdominal pain    48-year-old male presents with abdominal pain.  Patient states pain has been present for about 5 days.  He has had associated nausea and vomiting.  He states he has not been able to keep anything down.  He feels dehydrated.  He denies fever.  Pain is diffuse without radiation.  He denies any alleviating or exacerbating factors.    History provided by:  Patient    Review of Systems   Constitutional:  Negative for fever.   HENT:  Negative for congestion.    Respiratory:  Negative for cough and shortness of breath.    Cardiovascular:  Negative for chest pain.   Gastrointestinal:  Positive for abdominal pain, nausea and vomiting.   Genitourinary:  Negative for dysuria.   Musculoskeletal:  Negative for back pain.   Neurological:  Negative for headaches.     Past Medical History:   Diagnosis Date    Hyperlipidemia     Hypertension     Knee pain     Low back pain     Neck pain     Type 2 diabetes mellitus        No Known Allergies    Past Surgical History:   Procedure Laterality Date    ENDOSCOPY N/A 2023    Procedure: ESOPHAGOGASTRODUODENOSCOPY WITH BIOPSY X1 AREA;  Surgeon: Antwan Gutierrez MD;  Location: [facility] ENDOSCOPY;  Service: Gastroenterology;  Laterality: N/A;  gastritis, duodenal ulcers, GERD       Family History   Problem Relation Age of Onset    Diabetes Maternal Grandmother     Hypertension Maternal Grandmother     Hyperlipidemia Maternal Grandmother     Cancer Maternal Grandfather     Cancer Father        Social History     Socioeconomic History    Marital status:    Tobacco Use    Smoking status: Former     Packs/day: 2.00     Years: 30.00     Pack years: 60.00     Types: Cigarettes     Quit date:      Years since quittin.7    Smokeless tobacco: Former   Vaping Use    Vaping Use: Never used   Substance and Sexual Activity    Alcohol use: Not Currently     Comment: new years    Drug use: Not Currently      "Types: Marijuana     Comment: used in the past 4/20/22    Sexual activity: Not Currently     Partners: Female       /88   Pulse 77   Temp 97.7 °F (36.5 °C) (Oral)   Resp 16   Ht 182.9 cm (72\")   Wt 86.2 kg (190 lb)   SpO2 98%   BMI 25.77 kg/m²       Objective   Physical Exam  Vitals and nursing note reviewed.   Constitutional:       Appearance: He is well-developed.   HENT:      Head: Normocephalic and atraumatic.   Cardiovascular:      Rate and Rhythm: Normal rate and regular rhythm.      Heart sounds: Normal heart sounds.   Pulmonary:      Effort: Pulmonary effort is normal. No respiratory distress.      Breath sounds: Normal breath sounds.   Abdominal:      General: Bowel sounds are normal.      Palpations: Abdomen is soft.      Tenderness: There is generalized abdominal tenderness. There is no guarding or rebound.   Skin:     General: Skin is warm and dry.   Neurological:      Mental Status: He is alert and oriented to person, place, and time.       Procedures           ED Course      Results for orders placed or performed during the hospital encounter of 09/26/23   Comprehensive Metabolic Panel    Specimen: Arm, Right; Blood   Result Value Ref Range    Glucose 149 (H) 65 - 99 mg/dL    BUN 16 6 - 20 mg/dL    Creatinine 0.83 0.76 - 1.27 mg/dL    Sodium 139 136 - 145 mmol/L    Potassium 3.8 3.5 - 5.2 mmol/L    Chloride 103 98 - 107 mmol/L    CO2 23.0 22.0 - 29.0 mmol/L    Calcium 9.6 8.6 - 10.5 mg/dL    Total Protein 7.0 6.0 - 8.5 g/dL    Albumin 4.7 3.5 - 5.2 g/dL    ALT (SGPT) 13 1 - 41 U/L    AST (SGOT) 18 1 - 40 U/L    Alkaline Phosphatase 61 39 - 117 U/L    Total Bilirubin 0.7 0.0 - 1.2 mg/dL    Globulin 2.3 gm/dL    A/G Ratio 2.0 g/dL    BUN/Creatinine Ratio 19.3 7.0 - 25.0    Anion Gap 13.0 5.0 - 15.0 mmol/L    eGFR 107.3 >60.0 mL/min/1.73   Lipase    Specimen: Arm, Right; Blood   Result Value Ref Range    Lipase 16 13 - 60 U/L   CBC Auto Differential    Specimen: Arm, Right; Blood   Result " Value Ref Range    WBC 8.80 3.40 - 10.80 10*3/mm3    RBC 4.93 4.14 - 5.80 10*6/mm3    Hemoglobin 15.2 13.0 - 17.7 g/dL    Hematocrit 44.4 37.5 - 51.0 %    MCV 90.1 79.0 - 97.0 fL    MCH 30.8 26.6 - 33.0 pg    MCHC 34.3 31.5 - 35.7 g/dL    RDW 12.5 12.3 - 15.4 %    RDW-SD 38.1 37.0 - 54.0 fl    MPV 8.2 6.0 - 12.0 fL    Platelets 184 140 - 450 10*3/mm3    Neutrophil % 85.5 (H) 42.7 - 76.0 %    Lymphocyte % 9.8 (L) 19.6 - 45.3 %    Monocyte % 3.7 (L) 5.0 - 12.0 %    Eosinophil % 0.6 0.3 - 6.2 %    Basophil % 0.4 0.0 - 1.5 %    Neutrophils, Absolute 7.50 (H) 1.70 - 7.00 10*3/mm3    Lymphocytes, Absolute 0.90 0.70 - 3.10 10*3/mm3    Monocytes, Absolute 0.30 0.10 - 0.90 10*3/mm3    Eosinophils, Absolute 0.10 0.00 - 0.40 10*3/mm3    Basophils, Absolute 0.00 0.00 - 0.20 10*3/mm3    nRBC 0.0 0.0 - 0.2 /100 WBC   POC Glucose Once    Specimen: Blood   Result Value Ref Range    Glucose 162 (H) 70 - 105 mg/dL   Green Top (Gel)   Result Value Ref Range    Extra Tube HOLD    Light Blue Top   Result Value Ref Range    Extra Tube Hold for add-ons.      CT Abdomen Pelvis With Contrast    Result Date: 9/26/2023  Impression: 1. No acute process seen within the abdomen or pelvis. 2. Ventral fat-containing hernia. No associated inflammatory change. Electronically Signed: Jacinto Keith MD  9/26/2023 11:01 PM EDT  Workstation ID: [id number]                                        Medical Decision Making  Amount and/or Complexity of Data Reviewed  Labs: ordered.  Radiology: ordered.    Risk  Prescription drug management.      Patient had the above evaluation.  Results were discussed with the patient.  White blood cell count is normal.  CMP and lipase are unremarkable.  CT of the abdomen and pelvis shows no acute abnormality.  Patient is feeling much better after ER therapy.  He has a benign abdominal exam.  He will be discharged and will be given gastroenterology follow-up.      Final diagnoses:   Abdominal pain, unspecified abdominal  location       ED Disposition  ED Disposition       ED Disposition   Discharge    Condition   Stable    Comment   --               Antwan Gutierrez MD  [address] [facility] [place] [facility]  [phone]    Call in 2 days           Medication List      No changes were made to your prescriptions during this visit.            Ady Farmer MD  09/26/23 0779

## 2023-10-01 ENCOUNTER — HOSPITAL ENCOUNTER (EMERGENCY)
Facility: HOSPITAL | Age: 48
Discharge: HOME | End: 2023-10-01
Attending: EMERGENCY MEDICINE | Admitting: EMERGENCY MEDICINE
Payer: MEDICARE

## 2023-10-01 VITALS
WEIGHT: 190.7 LBS | RESPIRATION RATE: 16 BRPM | DIASTOLIC BLOOD PRESSURE: 74 MMHG | HEIGHT: 72 IN | HEART RATE: 85 BPM | OXYGEN SATURATION: 97 % | BODY MASS INDEX: 25.83 KG/M2 | TEMPERATURE: 99.1 F | SYSTOLIC BLOOD PRESSURE: 123 MMHG

## 2023-10-01 DIAGNOSIS — L50.9 URTICARIA: Primary | ICD-10-CM

## 2023-10-01 LAB — GLUCOSE BLDC GLUCOMTR-MCNC: 111 MG/DL (ref 70–105)

## 2023-10-01 PROCEDURE — 25010000002 METHYLPREDNISOLONE PER 125 MG: Performed by: NURSE PRACTITIONER

## 2023-10-01 PROCEDURE — 99283 EMERGENCY DEPT VISIT LOW MDM: CPT

## 2023-10-01 PROCEDURE — 96375 TX/PRO/DX INJ NEW DRUG ADDON: CPT

## 2023-10-01 PROCEDURE — 25010000002 DIPHENHYDRAMINE PER 50 MG: Performed by: NURSE PRACTITIONER

## 2023-10-01 PROCEDURE — 82948 REAGENT STRIP/BLOOD GLUCOSE: CPT

## 2023-10-01 PROCEDURE — 96374 THER/PROPH/DIAG INJ IV PUSH: CPT

## 2023-10-01 RX ORDER — FAMOTIDINE 10 MG/ML
20 INJECTION, SOLUTION INTRAVENOUS ONCE
Status: COMPLETED | OUTPATIENT
Start: 2023-10-01 | End: 2023-10-01

## 2023-10-01 RX ORDER — SODIUM CHLORIDE 0.9 % (FLUSH) 0.9 %
10 SYRINGE (ML) INJECTION AS NEEDED
Status: DISCONTINUED | OUTPATIENT
Start: 2023-10-01 | End: 2023-10-01 | Stop reason: HOSPADM

## 2023-10-01 RX ORDER — PREDNISONE 20 MG/1
40 TABLET ORAL DAILY
Qty: 4 TABLET | Refills: 0 | Status: SHIPPED | OUTPATIENT
Start: 2023-10-01 | End: 2023-10-03

## 2023-10-01 RX ORDER — DIPHENHYDRAMINE HYDROCHLORIDE 50 MG/ML
25 INJECTION INTRAMUSCULAR; INTRAVENOUS ONCE
Status: COMPLETED | OUTPATIENT
Start: 2023-10-01 | End: 2023-10-01

## 2023-10-01 RX ORDER — METHYLPREDNISOLONE SODIUM SUCCINATE 125 MG/2ML
125 INJECTION, POWDER, LYOPHILIZED, FOR SOLUTION INTRAMUSCULAR; INTRAVENOUS ONCE
Status: COMPLETED | OUTPATIENT
Start: 2023-10-01 | End: 2023-10-01

## 2023-10-01 RX ADMIN — METHYLPREDNISOLONE SODIUM SUCCINATE 125 MG: 125 INJECTION, POWDER, FOR SOLUTION INTRAMUSCULAR; INTRAVENOUS at 02:56

## 2023-10-01 RX ADMIN — DIPHENHYDRAMINE HYDROCHLORIDE 25 MG: 50 INJECTION, SOLUTION INTRAMUSCULAR; INTRAVENOUS at 03:04

## 2023-10-01 RX ADMIN — FAMOTIDINE 20 MG: 10 INJECTION, SOLUTION INTRAVENOUS at 03:05

## 2023-10-01 NOTE — ED NOTES
Patient has hives/rash bilaterally on upper and lower extremities as well as chest and buttocks. Pt reports no known changes in any soaps, lotions, etc. But did state recent adoption of a rabbit.. pt also reports feeling extremely fatigue for the majority of the day. No c/o difficulty breathing or throat discomfort. V/S stable. Call light within reach.

## 2023-10-01 NOTE — DISCHARGE INSTRUCTIONS
Continue Benadryl at home  Follow-up with allergist  Monitor blood sugars closely as steroid can increase your blood sugar  Follow up with PCP, call for an appointment in 1-2 days, if you do not have a primary care provider please use patient connection [phone] to help establish care  Follow up with any specialist as indicated and discussed  Return to the ED for new or worsening symptoms  Take any medications as prescribed

## 2023-10-01 NOTE — ED PROVIDER NOTES
Subjective   Chief Complaint   Patient presents with    Rash       History of Present Illness  Patient is a 48-year male presents the ED with complaint of an itching rash.Patient reports that started earlier today, he reports he has been generally weak, but has no other complaints.  No recent travel.  No tick bites.  Unknown of any exposures.  He does report he had allergy testing before, he has an EpiPen at home.  No oral swelling, tongue swelling, difficulty swallowing, shortness of breath.  No chest pain.  No nausea vomiting diarrhea.  No other systemic symptoms  Review of Systems   Constitutional:  Negative for chills and fever.   Respiratory:  Negative for shortness of breath.    Cardiovascular:  Negative for chest pain.   Gastrointestinal:  Negative for abdominal pain.   Musculoskeletal:  Negative for back pain and neck pain.   Skin:  Positive for rash. Negative for color change.   Neurological:  Negative for dizziness, syncope, weakness and light-headedness.     Past Medical History:   Diagnosis Date    Hyperlipidemia     Hypertension     Knee pain     Low back pain     Neck pain     Type 2 diabetes mellitus        No Known Allergies    Past Surgical History:   Procedure Laterality Date    ENDOSCOPY N/A 2023    Procedure: ESOPHAGOGASTRODUODENOSCOPY WITH BIOPSY X1 AREA;  Surgeon: Antwan Gutierrez MD;  Location: [facility] ENDOSCOPY;  Service: Gastroenterology;  Laterality: N/A;  gastritis, duodenal ulcers, GERD       Family History   Problem Relation Age of Onset    Diabetes Maternal Grandmother     Hypertension Maternal Grandmother     Hyperlipidemia Maternal Grandmother     Cancer Maternal Grandfather     Cancer Father        Social History     Socioeconomic History    Marital status:    Tobacco Use    Smoking status: Former     Packs/day: 2.00     Years: 30.00     Pack years: 60.00     Types: Cigarettes     Quit date:      Years since quittin.7    Smokeless tobacco: Former   Vaping  "Use    Vaping Use: Never used   Substance and Sexual Activity    Alcohol use: Not Currently     Comment: new years    Drug use: Not Currently     Types: Marijuana     Comment: used in the past 4/20/22    Sexual activity: Not Currently     Partners: Female           Objective   Physical Exam  Vitals and nursing note reviewed.   Constitutional:       Appearance: Normal appearance. He is not toxic-appearing.   HENT:      Head: Normocephalic and atraumatic.      Comments: Patient has no evidence for airway compromise, no angioedema, no oral or tongue swelling.     Nose: Nose normal.      Mouth/Throat:      Mouth: Mucous membranes are moist.      Pharynx: Oropharynx is clear.   Eyes:      Extraocular Movements: Extraocular movements intact.      Conjunctiva/sclera: Conjunctivae normal.      Pupils: Pupils are equal, round, and reactive to light.   Cardiovascular:      Rate and Rhythm: Normal rate and regular rhythm.      Heart sounds: Normal heart sounds. No murmur heard.    No friction rub. No gallop.   Abdominal:      General: Bowel sounds are normal.      Palpations: Abdomen is soft.   Musculoskeletal:         General: Normal range of motion.   Skin:     General: Skin is warm and dry.      Capillary Refill: Capillary refill takes less than 2 seconds.      Findings: Rash present. Rash is urticarial.      Comments: Urticarial rash noted bilateral extremities, back.   Neurological:      Mental Status: He is alert and oriented to person, place, and time.       Procedures           ED Course  ED Course as of 10/01/23 0428   Sun Oct 01, 2023   0358 Rash improving. No signs of angioedema or airway compromise.  [LB]      ED Course User Index  [LB] Becki Willis Aldair, APRN      /74   Pulse 85   Temp 99.1 °F (37.3 °C)   Resp 16   Ht 182.9 cm (72\")   Wt 86.5 kg (190 lb 11.2 oz)   SpO2 97%   BMI 25.86 kg/m²   Medications   sodium chloride 0.9 % flush 10 mL (has no administration in time range)   diphenhydrAMINE " (BENADRYL) injection 25 mg (25 mg Intravenous Given 10/1/23 0304)   famotidine (PEPCID) injection 20 mg (20 mg Intravenous Given 10/1/23 0305)   methylPREDNISolone sodium succinate (SOLU-Medrol) injection 125 mg (125 mg Intravenous Given 10/1/23 0256)     No radiology results for the last day  Lab Results (last 24 hours)       ** No results found for the last 24 hours. **                                               Medical Decision Making  Problems Addressed:  Urticaria: complicated acute illness or injury    Amount and/or Complexity of Data Reviewed  Labs: ordered.    Risk  Prescription drug management.    Patient is a 48-year male presents the ED with complaint of a rash.  Consistent with urticaria.  Given Benadryl, Solu-Medrol Pepcid.  Rash slowly worsening.  Actually appears to be improving.  Itching is improving.  No signs of angioedema.  No signs of systemic illness.  Patient was given a short course of prednisone for home he is diabetic.  Advised him to keep monitoring his blood sugars.  Advised use of Benadryl at home.  I spoke with the patient at the bedside regarding their plan of care, discharge instruction,  prescriptions, as well as reasons to return to the emergency department.  We discussed test results at the bedside, including incidental abnormal labs, radiological findings, understands need and importance  for follow-up with primary care or specialist if indicated.  Patient verbalizes understanding and agrees to the treatment plan at this time.  Note Disclaimer: At [facility], we believe that sharing information builds trust and better relationships. You are receiving this note because you recently visited [facility]. It is possible you will see health information before a provider has talked with you about it. This kind of information can be easy to misunderstand. To help you fully understand what it means for your health, we urge you to discuss this note with your provider.Note dictated  utilizing Dragon Dictation. Appropriate PPE worn during patient interactions.    Does have an EpiPen at home.            Final diagnoses:   Urticaria       ED Disposition  ED Disposition       ED Disposition   Discharge    Condition   Stable    Comment   --               [facility]  [address]  [facility] [address] [phone]        Eufemia Malcolm PA  [address]  [place] [address] [phone]          [facility] EMERGENCY DEPARTMENT  [address] [facility] [place] [address] [phone]             Medication List        New Prescriptions      predniSONE 20 MG tablet  Commonly known as: DELTASONE  Take 2 tablets by mouth Daily for 2 days.               Where to Get Your Medications        These medications were sent to [id number] - [place], [place] - [address] AT [facility] - [phone]  - [phone]   [address], [place] [address]      Phone: [phone]   predniSONE 20 MG tablet            Becki Willis, LISA  10/01/23 0428

## 2023-10-06 ENCOUNTER — HOSPITAL ENCOUNTER (EMERGENCY)
Facility: HOSPITAL | Age: 48
Discharge: HOME | End: 2023-10-06
Attending: EMERGENCY MEDICINE
Payer: MEDICARE

## 2023-10-06 VITALS
RESPIRATION RATE: 18 BRPM | BODY MASS INDEX: 25.83 KG/M2 | WEIGHT: 190.7 LBS | SYSTOLIC BLOOD PRESSURE: 165 MMHG | TEMPERATURE: 97.8 F | OXYGEN SATURATION: 97 % | HEART RATE: 68 BPM | DIASTOLIC BLOOD PRESSURE: 106 MMHG | HEIGHT: 72 IN

## 2023-10-06 DIAGNOSIS — R10.84 GENERALIZED ABDOMINAL PAIN: Primary | ICD-10-CM

## 2023-10-06 DIAGNOSIS — R11.2 NAUSEA AND VOMITING, UNSPECIFIED VOMITING TYPE: ICD-10-CM

## 2023-10-06 LAB
ALBUMIN SERPL-MCNC: 4.4 G/DL (ref 3.5–5.2)
ALBUMIN/GLOB SERPL: 2 G/DL
ALP SERPL-CCNC: 54 U/L (ref 39–117)
ALT SERPL W P-5'-P-CCNC: 12 U/L (ref 1–41)
AMPHET+METHAMPHET UR QL: NEGATIVE
ANION GAP SERPL CALCULATED.3IONS-SCNC: 13 MMOL/L (ref 5–15)
AST SERPL-CCNC: 15 U/L (ref 1–40)
BARBITURATES UR QL SCN: NEGATIVE
BENZODIAZ UR QL SCN: NEGATIVE
BILIRUB SERPL-MCNC: 0.4 MG/DL (ref 0–1.2)
BILIRUB UR QL STRIP: NEGATIVE
BUN SERPL-MCNC: 21 MG/DL (ref 6–20)
BUN/CREAT SERPL: 31.8 (ref 7–25)
CALCIUM SPEC-SCNC: 9.4 MG/DL (ref 8.6–10.5)
CANNABINOIDS SERPL QL: POSITIVE
CHLORIDE SERPL-SCNC: 105 MMOL/L (ref 98–107)
CLARITY UR: CLEAR
CO2 SERPL-SCNC: 23 MMOL/L (ref 22–29)
COCAINE UR QL: NEGATIVE
COLOR UR: YELLOW
CREAT SERPL-MCNC: 0.66 MG/DL (ref 0.76–1.27)
DEPRECATED RDW RBC AUTO: 40.7 FL (ref 37–54)
EGFRCR SERPLBLD CKD-EPI 2021: 115 ML/MIN/1.73
ERYTHROCYTE [DISTWIDTH] IN BLOOD BY AUTOMATED COUNT: 12.8 % (ref 12.3–15.4)
GLOBULIN UR ELPH-MCNC: 2.2 GM/DL
GLUCOSE SERPL-MCNC: 152 MG/DL (ref 65–99)
GLUCOSE UR STRIP-MCNC: NEGATIVE MG/DL
HCT VFR BLD AUTO: 40.3 % (ref 37.5–51)
HGB BLD-MCNC: 14.1 G/DL (ref 13–17.7)
HGB UR QL STRIP.AUTO: NEGATIVE
HOLD SPECIMEN: NORMAL
HOLD SPECIMEN: NORMAL
KETONES UR QL STRIP: ABNORMAL
LEUKOCYTE ESTERASE UR QL STRIP.AUTO: NEGATIVE
LIPASE SERPL-CCNC: 56 U/L (ref 13–60)
LYMPHOCYTES # BLD MANUAL: 3.57 10*3/MM3 (ref 0.7–3.1)
LYMPHOCYTES NFR BLD MANUAL: 5 % (ref 5–12)
MCH RBC QN AUTO: 30.6 PG (ref 26.6–33)
MCHC RBC AUTO-ENTMCNC: 35 G/DL (ref 31.5–35.7)
MCV RBC AUTO: 87.6 FL (ref 79–97)
METHADONE UR QL SCN: NEGATIVE
MONOCYTES # BLD: 0.47 10*3/MM3 (ref 0.1–0.9)
NEUTROPHILS # BLD AUTO: 5.36 10*3/MM3 (ref 1.7–7)
NEUTROPHILS NFR BLD MANUAL: 57 % (ref 42.7–76)
NITRITE UR QL STRIP: NEGATIVE
OPIATES UR QL: NEGATIVE
OXYCODONE UR QL SCN: NEGATIVE
PH UR STRIP.AUTO: >=9 [PH] (ref 5–8)
PLAT MORPH BLD: NORMAL
PLATELET # BLD AUTO: 175 10*3/MM3 (ref 140–450)
PMV BLD AUTO: 8.3 FL (ref 6–12)
POTASSIUM SERPL-SCNC: 3.6 MMOL/L (ref 3.5–5.2)
PROT SERPL-MCNC: 6.6 G/DL (ref 6–8.5)
PROT UR QL STRIP: NEGATIVE
RBC # BLD AUTO: 4.6 10*6/MM3 (ref 4.14–5.8)
RBC MORPH BLD: NORMAL
SCAN SLIDE: NORMAL
SODIUM SERPL-SCNC: 141 MMOL/L (ref 136–145)
SP GR UR STRIP: 1.01 (ref 1–1.03)
UROBILINOGEN UR QL STRIP: ABNORMAL
VARIANT LYMPHS NFR BLD MANUAL: 1 % (ref 0–5)
VARIANT LYMPHS NFR BLD MANUAL: 37 % (ref 19.6–45.3)
WBC MORPH BLD: NORMAL
WBC NRBC COR # BLD: 9.4 10*3/MM3 (ref 3.4–10.8)
WHOLE BLOOD HOLD COAG: NORMAL
WHOLE BLOOD HOLD SPECIMEN: NORMAL

## 2023-10-06 PROCEDURE — 25810000003 SODIUM CHLORIDE 0.9 % SOLUTION: Performed by: PHYSICIAN ASSISTANT

## 2023-10-06 PROCEDURE — 80307 DRUG TEST PRSMV CHEM ANLYZR: CPT | Performed by: PHYSICIAN ASSISTANT

## 2023-10-06 PROCEDURE — 96361 HYDRATE IV INFUSION ADD-ON: CPT

## 2023-10-06 PROCEDURE — 96375 TX/PRO/DX INJ NEW DRUG ADDON: CPT

## 2023-10-06 PROCEDURE — 85025 COMPLETE CBC W/AUTO DIFF WBC: CPT | Performed by: PHYSICIAN ASSISTANT

## 2023-10-06 PROCEDURE — 83690 ASSAY OF LIPASE: CPT | Performed by: PHYSICIAN ASSISTANT

## 2023-10-06 PROCEDURE — 85007 BL SMEAR W/DIFF WBC COUNT: CPT | Performed by: PHYSICIAN ASSISTANT

## 2023-10-06 PROCEDURE — 96374 THER/PROPH/DIAG INJ IV PUSH: CPT

## 2023-10-06 PROCEDURE — 25010000002 DROPERIDOL PER 5 MG: Performed by: PHYSICIAN ASSISTANT

## 2023-10-06 PROCEDURE — 81003 URINALYSIS AUTO W/O SCOPE: CPT | Performed by: PHYSICIAN ASSISTANT

## 2023-10-06 PROCEDURE — 25010000002 HYDROMORPHONE 1 MG/ML SOLUTION: Performed by: PHYSICIAN ASSISTANT

## 2023-10-06 PROCEDURE — 25010000002 ONDANSETRON PER 1 MG: Performed by: PHYSICIAN ASSISTANT

## 2023-10-06 PROCEDURE — 99283 EMERGENCY DEPT VISIT LOW MDM: CPT

## 2023-10-06 PROCEDURE — 80053 COMPREHEN METABOLIC PANEL: CPT | Performed by: PHYSICIAN ASSISTANT

## 2023-10-06 RX ORDER — DROPERIDOL 2.5 MG/ML
1.25 INJECTION, SOLUTION INTRAMUSCULAR; INTRAVENOUS ONCE
Status: COMPLETED | OUTPATIENT
Start: 2023-10-06 | End: 2023-10-06

## 2023-10-06 RX ORDER — SODIUM CHLORIDE 0.9 % (FLUSH) 0.9 %
10 SYRINGE (ML) INJECTION AS NEEDED
Status: DISCONTINUED | OUTPATIENT
Start: 2023-10-06 | End: 2023-10-06

## 2023-10-06 RX ORDER — PROMETHAZINE HYDROCHLORIDE 12.5 MG/1
12.5 TABLET ORAL EVERY 6 HOURS PRN
Qty: 10 TABLET | Refills: 0 | Status: SHIPPED | OUTPATIENT
Start: 2023-10-06

## 2023-10-06 RX ORDER — SODIUM CHLORIDE 0.9 % (FLUSH) 0.9 %
10 SYRINGE (ML) INJECTION AS NEEDED
Status: DISCONTINUED | OUTPATIENT
Start: 2023-10-06 | End: 2023-10-06 | Stop reason: HOSPADM

## 2023-10-06 RX ORDER — FAMOTIDINE 20 MG/1
20 TABLET, FILM COATED ORAL NIGHTLY PRN
Qty: 20 TABLET | Refills: 0 | Status: SHIPPED | OUTPATIENT
Start: 2023-10-06

## 2023-10-06 RX ORDER — ONDANSETRON 2 MG/ML
4 INJECTION INTRAMUSCULAR; INTRAVENOUS ONCE
Status: COMPLETED | OUTPATIENT
Start: 2023-10-06 | End: 2023-10-06

## 2023-10-06 RX ORDER — SUCRALFATE 1 G/1
1 TABLET ORAL 4 TIMES DAILY
Qty: 28 TABLET | Refills: 0 | Status: SHIPPED | OUTPATIENT
Start: 2023-10-06 | End: 2023-10-13

## 2023-10-06 RX ADMIN — ONDANSETRON 4 MG: 2 INJECTION INTRAMUSCULAR; INTRAVENOUS at 10:26

## 2023-10-06 RX ADMIN — DROPERIDOL 1.25 MG: 2.5 INJECTION, SOLUTION INTRAMUSCULAR; INTRAVENOUS at 09:19

## 2023-10-06 RX ADMIN — HYDROMORPHONE HYDROCHLORIDE 0.5 MG: 1 INJECTION, SOLUTION INTRAMUSCULAR; INTRAVENOUS; SUBCUTANEOUS at 10:26

## 2023-10-06 RX ADMIN — SODIUM CHLORIDE 1000 ML: 9 INJECTION, SOLUTION INTRAVENOUS at 09:19

## 2023-10-06 NOTE — Clinical Note
[facility] EMERGENCY DEPARTMENT  [address] [place] [address]  Phone: [phone]    Aj Plaza was seen and treated in our emergency department on 10/6/2023.  He may return to work on 10/08/2023.         Thank you for choosing [facility].    Adelina Culp PA

## 2023-10-06 NOTE — DISCHARGE INSTRUCTIONS
Stop smoking marijuana.  Medications as directed.  Drink plenty of clear fluids.    Follow-up with your primary care provider in 3-5 days.  If you do not have a primary care provider call [phone] for help in finding one, or you may follow up with [facility] at [phone].    Return to ED for any new or worsening symptom

## 2023-10-06 NOTE — ED PROVIDER NOTES
Subjective   History of Present Illness  MIKI is a 48-year-old male presents today for a abdominal pain and vomiting since the morning.  Patient was seen with the same abdominal pain on 9/26/23.  Patient describes the pain as a constant general abdominal pain that is slightly worse at his hernia.  He says he is nauseous and cannot keep food or fluids down.  Patient is positive for nausea and chills.  Patient is negative for fever, headache, rhinorrhea, congestion, chest pain, shortness of breath, recent travel.  Patient is currently on pantoprazole and Carafate prescribed by GI.  Patient did have a recent EGD that showed duodenal ulcers.  Patient states he is getting scheduled to get a colonoscopy as well.      Review of Systems   Constitutional: Negative.    Respiratory: Negative.     Cardiovascular: Negative.    Gastrointestinal:  Positive for abdominal pain, nausea and vomiting. Negative for constipation and diarrhea.   Genitourinary:  Negative for decreased urine volume, difficulty urinating, dysuria, flank pain, frequency and hematuria.   Musculoskeletal:  Negative for back pain, neck pain and neck stiffness.   Skin: Negative.      Past Medical History:   Diagnosis Date    Hyperlipidemia     Hypertension     Knee pain     Low back pain     Neck pain     Type 2 diabetes mellitus        No Known Allergies    Past Surgical History:   Procedure Laterality Date    ENDOSCOPY N/A 5/5/2023    Procedure: ESOPHAGOGASTRODUODENOSCOPY WITH BIOPSY X1 AREA;  Surgeon: Antwan Gutierrez MD;  Location: [facility] ENDOSCOPY;  Service: Gastroenterology;  Laterality: N/A;  gastritis, duodenal ulcers, GERD       Family History   Problem Relation Age of Onset    Diabetes Maternal Grandmother     Hypertension Maternal Grandmother     Hyperlipidemia Maternal Grandmother     Cancer Maternal Grandfather     Cancer Father        Social History     Socioeconomic History    Marital status:    Tobacco Use    Smoking status: Former      Packs/day: 2.00     Years: 30.00     Pack years: 60.00     Types: Cigarettes     Quit date: 2017     Years since quittin.7    Smokeless tobacco: Former   Vaping Use    Vaping Use: Never used   Substance and Sexual Activity    Alcohol use: Not Currently     Comment: new years    Drug use: Not Currently     Types: Marijuana     Comment: used in the past 22    Sexual activity: Not Currently     Partners: Female           Objective   Physical Exam  Vitals and nursing note reviewed.   Constitutional:       General: He is not in acute distress.     Appearance: He is well-developed. He is ill-appearing. He is not toxic-appearing or diaphoretic.   HENT:      Head: Normocephalic and atraumatic.      Mouth/Throat:      Mouth: Mucous membranes are moist.      Pharynx: Oropharynx is clear.   Eyes:      General: No scleral icterus.     Extraocular Movements: Extraocular movements intact.      Pupils: Pupils are equal, round, and reactive to light.   Cardiovascular:      Rate and Rhythm: Normal rate and regular rhythm.      Heart sounds: No murmur heard.    No friction rub. No gallop.   Pulmonary:      Effort: Pulmonary effort is normal. No tachypnea or accessory muscle usage.      Breath sounds: No decreased breath sounds, wheezing, rhonchi or rales.   Chest:      Chest wall: No mass, deformity, tenderness or crepitus.   Abdominal:      General: Bowel sounds are normal. There is no distension.      Palpations: Abdomen is soft.      Tenderness: There is generalized abdominal tenderness. There is no right CVA tenderness, left CVA tenderness, guarding or rebound.   Skin:     General: Skin is warm.      Capillary Refill: Capillary refill takes less than 2 seconds.      Findings: No rash.   Neurological:      General: No focal deficit present.      Mental Status: He is alert and oriented to person, place, and time.   Psychiatric:         Mood and Affect: Mood normal.         Behavior: Behavior normal.  "      Procedures           ED Course  ED Course as of 10/06/23 1147   Fri Oct 06, 2023   1014 CBC & Differential [AA]   1014 Comprehensive Metabolic Panel(!) [AA]   1049 THC Screen, Urine(!): Positive [AA]      ED Course User Index  [AA] Adelina Culp PA      BP (!) 165/106   Pulse 68   Temp 97.8 °F (36.6 °C) (Oral)   Resp 18   Ht 182.9 cm (72\")   Wt 86.5 kg (190 lb 11.2 oz)   SpO2 97%   BMI 25.86 kg/m²   Medications   sodium chloride 0.9 % flush 10 mL (has no administration in time range)   HYDROmorphone (DILAUDID) injection 0.5 mg (0.5 mg Intravenous Given 10/6/23 1026)   sodium chloride 0.9 % bolus 1,000 mL (1,000 mL Intravenous New Bag 10/6/23 0919)   droperidol (INAPSINE) injection 1.25 mg (1.25 mg Intravenous Given 10/6/23 0919)   ondansetron (ZOFRAN) injection 4 mg (4 mg Intravenous Given 10/6/23 1026)     Labs Reviewed   COMPREHENSIVE METABOLIC PANEL - Abnormal; Notable for the following components:       Result Value    Glucose 152 (*)     BUN 21 (*)     Creatinine 0.66 (*)     BUN/Creatinine Ratio 31.8 (*)     All other components within normal limits    Narrative:     GFR Normal >60  Chronic Kidney Disease <60  Kidney Failure <15     URINALYSIS W/ MICROSCOPIC IF INDICATED (NO CULTURE) - Abnormal; Notable for the following components:    pH, UA >=9.0 (*)     Ketones, UA 15 mg/dL (1+) (*)     All other components within normal limits    Narrative:     Urine microscopic not indicated.   URINE DRUG SCREEN - Abnormal; Notable for the following components:    THC, Screen, Urine Positive (*)     All other components within normal limits    Narrative:     Negative Thresholds Per Drugs Screened:    Amphetamines                 500 ng/ml  Barbiturates                 200 ng/ml  Benzodiazepines              100 ng/ml  Cocaine                      300 ng/ml  Methadone                    300 ng/ml  Opiates                      300 ng/ml  Oxycodone                    100 ng/ml  THC                         "   50 ng/ml    The Normal Value for all drugs tested is negative. This report includes final unconfirmed screening results to be used for medical treatment purposes only. Unconfirmed results must not be used for non-medical purposes such as employment or legal testing. Clinical consideration should be applied to any drug of abuse test, particularly when unconfirmed results are used.          All urine drugs of abuse requests without chain of custody are for medical screening purposes only.  False positives are possible.     MANUAL DIFFERENTIAL - Abnormal; Notable for the following components:    Lymphocytes Absolute 3.57 (*)     All other components within normal limits   LIPASE - Normal   CBC WITH AUTO DIFFERENTIAL - Normal    Narrative:     The previously reported component NRBC is no longer being reported. Previous result was 0.0 /100 WBC (Reference Range: 0.0-0.2 /100 WBC) on 10/6/2023 at 0930 EDT.   RAINBOW DRAW    Narrative:     The following orders were created for panel order [place] Draw.  Procedure                               Abnormality         Status                     ---------                               -----------         ------                     Green Top (Gel)[[id number]]                                  Final result               Lavender Top[[id number]]                                     Final result               Gold Top - SST[[id number]]                                   Final result               Light Blue Top[[id number]]                                   Final result                 Please view results for these tests on the individual orders.   SCAN SLIDE   GREEN TOP   LAVENDER TOP   GOLD TOP - SST   LIGHT BLUE TOP   CBC AND DIFFERENTIAL    Narrative:     The following orders were created for panel order CBC & Differential.  Procedure                               Abnormality         Status                     ---------                               -----------         ------                      CBC Auto Differential[[id number]]        Normal              Final result               Scan Slide[[id number]]                                       Final result                 Please view results for these tests on the individual orders.                                          Medical Decision Making  Chart Review:   -Patient has had 3 CT scans since February of this year which showed a fat-containing ventral hernia otherwise no acute abnormalities.   -Patient had EGD on 5/5/2023 which showed duodenal ulcer disease likely etiology of pain and vomiting at that time.  Was placed on pantoprazole 40 mg twice daily and Carafate 1 g 4 times a day    Comorbidity: As per past medical history  Differentials: Cannabis hyperemesis, intra-abdominal infection, dissection, peritonitis, peptic ulcer disease, pancreatitis, hepatitis, ischemic bowel, bowel obstruction ;this list is not all inclusive and does not constitute the entirety of considered causes  Labs: As above  Radiology: My interpretation      Disposition/Treatment:  Appropriate PPE was worn during exam and throughout all encounters with the patient.  When the ED IV was placed and labs were obtained patient was placed on proper monitors he was afebrile was somewhat ill-appearing but nontoxic in appearance.  Present with complaints of abdominal pain nausea and vomiting that started again this morning.  Patient states the symptoms this year alone.  He states his pain is no different from when he was seen here a few weeks ago.  On chart review patient has had 3 fairly unremarkable CT scans over the 9 months.  He also had an EGD back in May showed duodenal ulcer was started on pantoprazole and Carafate at that time which she states he has been taking as directed.  He does report history of THC use    Patient was given fluids with Inapsine for his nausea and pain.  He was given additional Dilaudid and Zofran for pain with improvement  Labs were obtained which  showed no leukocytosis or anemia.  Metabolic panel fairly unremarkable.  Urine drug screen positive for THC, urinalysis unremarkable for UTI    CT abdomen and pelvis was considered but not thought warranted as he has had 3 negative CTs over the past 9 months and describes the pain as similar in nature.     Case was also discussed with WOLF Pino with GI who did not believe additional imaging was warranted at this time and states of his abdominal pain and nausea improved he can be discharged and she will have the office follow-up with him.  She did recommend famotidine at night and to start Carafate again.  Patient is currently on a PPI.  Patient reports he is on Reglan and Zofran at home with minimal relief of his nausea.  Patient be given Phenergan for home.  We discussion cessation of THC use as it could be a contributing factor to his symptoms.  Patient was offered placement in observation unit for nausea and pain control but declined.  Patient states he was feeling better and was adamant about being discharged at this time.  Did try to do p.o. challenge on the patient but he declined.  Patient will also be given a work note for home he voiced understanding of strict return precautions.  Patient's blood pressure was elevated while in the ED.  Patient is on blood pressure medicine and has not taken any today.  He was advised to take this when he got home.  No reports of headache, chest pain, shortness of breath to warrant additional work-up.    This document is intended for medical expert use only. Reading of this document by patients and/or patient's family without participating medical staff guidance may result in misinterpretation and unintended morbidity.  Any interpretation of such data is the responsibility of the patient and/or family member responsible for the patient in concert with their primary or specialist providers, not to be left for sources of online searches such as [employer], [employer] or similar queries.  Relying on these approaches to knowledge may result in misinterpretation, misguided goals of care and even death should patients or family members try recommendations outside of the realm of professional medical care in a supervised inpatient environment.         Problems Addressed:  Generalized abdominal pain: acute illness or injury  Nausea and vomiting, unspecified vomiting type: acute illness or injury    Amount and/or Complexity of Data Reviewed  External Data Reviewed: labs, radiology and notes.     Details: as above  Labs: ordered. Decision-making details documented in ED Course.    Risk  Prescription drug management.        Final diagnoses:   Generalized abdominal pain   Nausea and vomiting, unspecified vomiting type       ED Disposition  ED Disposition       ED Disposition   Discharge    Condition   Stable    Comment   --               Eufemia Malcolm PA  [address]  [place] [address] [phone]    Schedule an appointment as soon as possible for a visit in 3 days      [facility] EMERGENCY DEPARTMENT  [address] [facility] [address] [phone]  Go to   If symptoms worsen    [facility]  [address] [facility] [address] [phone]  Schedule an appointment as soon as possible for a visit   Call their office Monday if you do not hear from them today.         Medication List        New Prescriptions      famotidine 20 MG tablet  Commonly known as: Pepcid  Take 1 tablet by mouth At Night As Needed for Heartburn.     promethazine 12.5 MG tablet  Commonly known as: PHENERGAN  Take 1 tablet by mouth Every 6 (Six) Hours As Needed for Nausea or Vomiting.     sucralfate 1 g tablet  Commonly known as: CARAFATE  Take 1 tablet by mouth 4 (Four) Times a Day for 7 days.               Where to Get Your Medications        These medications were sent to [id number] - [place], [place] - [address] AT [facility] &  [address] [phone] [facility] [phone] FX  [address], [place] [address]      Phone: [phone]   famotidine 20 MG tablet  promethazine 12.5 MG tablet  sucralfate 1 g tablet            Adelina Culp PA  10/06/23 1141

## 2023-10-13 ENCOUNTER — TRANSCRIBE ORDERS (OUTPATIENT)
Dept: ADMINISTRATIVE | Facility: HOSPITAL | Age: 48
End: 2023-10-13
Payer: MEDICARE

## 2023-10-13 DIAGNOSIS — M25.539 PAIN IN WRIST, UNSPECIFIED LATERALITY: Primary | ICD-10-CM

## 2023-10-13 DIAGNOSIS — R20.2 PARESTHESIA: ICD-10-CM

## 2023-11-01 DIAGNOSIS — Z79.4 TYPE 2 DIABETES MELLITUS WITH OTHER SPECIFIED COMPLICATION, WITH LONG-TERM CURRENT USE OF INSULIN: ICD-10-CM

## 2023-11-01 DIAGNOSIS — E11.69 TYPE 2 DIABETES MELLITUS WITH OTHER SPECIFIED COMPLICATION, WITH LONG-TERM CURRENT USE OF INSULIN: ICD-10-CM

## 2023-11-01 DIAGNOSIS — J42 CHRONIC BRONCHITIS, UNSPECIFIED CHRONIC BRONCHITIS TYPE: ICD-10-CM

## 2023-11-01 RX ORDER — ROFLUMILAST 500 UG/1
500 TABLET ORAL DAILY
Qty: 30 TABLET | Refills: 2 | OUTPATIENT
Start: 2023-11-01

## 2023-11-01 RX ORDER — SITAGLIPTIN AND METFORMIN HYDROCHLORIDE 1000; 50 MG/1; MG/1
2 TABLET, FILM COATED, EXTENDED RELEASE ORAL DAILY
Qty: 180 TABLET | Refills: 0 | OUTPATIENT
Start: 2023-11-01

## 2023-11-14 PROCEDURE — 88305 TISSUE EXAM BY PATHOLOGIST: CPT | Performed by: INTERNAL MEDICINE

## 2023-11-15 ENCOUNTER — LAB REQUISITION (OUTPATIENT)
Dept: LAB | Facility: HOSPITAL | Age: 48
End: 2023-11-15
Payer: MEDICARE

## 2023-11-15 ENCOUNTER — OFFICE VISIT (OUTPATIENT)
Dept: PAIN MEDICINE | Facility: CLINIC | Age: 48
End: 2023-11-15
Payer: MEDICARE

## 2023-11-15 VITALS
DIASTOLIC BLOOD PRESSURE: 80 MMHG | RESPIRATION RATE: 16 BRPM | OXYGEN SATURATION: 97 % | SYSTOLIC BLOOD PRESSURE: 132 MMHG | HEART RATE: 89 BPM

## 2023-11-15 DIAGNOSIS — R10.10 UPPER ABDOMINAL PAIN, UNSPECIFIED: ICD-10-CM

## 2023-11-15 DIAGNOSIS — G89.29 CHRONIC PAIN OF LEFT KNEE: Primary | ICD-10-CM

## 2023-11-15 DIAGNOSIS — M25.562 CHRONIC PAIN OF LEFT KNEE: Primary | ICD-10-CM

## 2023-11-15 DIAGNOSIS — Z86.010 PERSONAL HISTORY OF COLONIC POLYPS: ICD-10-CM

## 2023-11-15 DIAGNOSIS — E11.42 DIABETIC POLYNEUROPATHY ASSOCIATED WITH TYPE 2 DIABETES MELLITUS: ICD-10-CM

## 2023-11-15 RX ORDER — PREGABALIN 200 MG/1
200 CAPSULE ORAL 2 TIMES DAILY
Qty: 60 CAPSULE | Refills: 2 | Status: SHIPPED | OUTPATIENT
Start: 2023-11-15

## 2023-11-15 RX ORDER — HYDROCODONE BITARTRATE AND ACETAMINOPHEN 5; 325 MG/1; MG/1
1 TABLET ORAL 3 TIMES DAILY PRN
Qty: 90 TABLET | Refills: 0 | Status: SHIPPED | OUTPATIENT
Start: 2024-01-19

## 2023-11-15 RX ORDER — HYDROCODONE BITARTRATE AND ACETAMINOPHEN 5; 325 MG/1; MG/1
1 TABLET ORAL 3 TIMES DAILY PRN
Qty: 90 TABLET | Refills: 0 | Status: SHIPPED | OUTPATIENT
Start: 2023-11-21

## 2023-11-15 RX ORDER — HYDROCODONE BITARTRATE AND ACETAMINOPHEN 5; 325 MG/1; MG/1
1 TABLET ORAL 3 TIMES DAILY PRN
Qty: 90 TABLET | Refills: 0 | Status: SHIPPED | OUTPATIENT
Start: 2023-12-20

## 2023-11-15 NOTE — PROGRESS NOTES
Patient screened positive for depression based on a PHQ-9 score of 0 on 11/15/2023. Follow-up recommendations include: Elevated PHQ score reflective of acute illness, not depression     CHIEF COMPLAINT  Chief Complaint   Patient presents with    Neuralgia     3 month f/u  CC  pain in the neck, mid back, low back, knees  Treated w/Tramadol LD @ 2:30 pm 11/15       Primary Care  Eufemia Malcolm PA    Subjective   Aj Plaza is a 48 y.o. male  who presents for generalized pain.  He states that he has had pain in the neck, mid back, low back, knees for many years.  It appears that he was previously being treated at an outside pain clinic approximately 10 years ago with a combination of narcotics however he is no longer at the clinic for unknown reasons.  He states that most recently, he was smoking marijuana for pain control however he has subsequently developed lung issues and can no longer smoke marijuana.  He presents today to establish care.  He describes pain essentially all over his body.  He cannot identify any specific areas of pain which are of more concern than the others.    Back Pain  Associated symptoms include numbness. Pertinent negatives include no weakness.   Pain  Associated symptoms include arthralgias, myalgias, neck pain and numbness. Pertinent negatives include no weakness.   Neck Pain   Associated symptoms include numbness. Pertinent negatives include no weakness.   Knee Pain   Associated symptoms include numbness.        Location: Generalized pain  Onset: Many years ago  Duration: Progressively worsening  Timing: Constant throughout the day  Quality: Numbness and tingling in the hands with sharp, stabbing pains in the legs and feet  Severity: Today: 5       Last Week: 6       Worst: 8  Modifying Factors: The pain is fairly constant without any exacerbating or relieving factors    Physical Therapy: no    Interval Update 11/15/2023: Complains of worsening pain.  Limited benefit with tramadol.   Does get benefit with Lyrica.    The following portions of the patient's history were reviewed and updated as appropriate: allergies, current medications, past family history, past medical history, past social history, past surgical history and problem list.      Current Outpatient Medications:     Accu-Chek Softclix Lancets lancets, 1 each by Other route 2 (Two) Times a Day. Use to monitor glucose twice daily. E11.9, Disp: 100 each, Rfl: 2    albuterol sulfate  (90 Base) MCG/ACT inhaler, Inhale 2 puffs Every 4 (Four) Hours As Needed for Wheezing., Disp: 6.7 g, Rfl: 2    amLODIPine (NORVASC) 10 MG tablet, Take 1 tablet by mouth Daily., Disp: 30 tablet, Rfl: 0    atorvastatin (LIPITOR) 40 MG tablet, TAKE 1 TABLET BY MOUTH EVERY NIGHT, Disp: 30 tablet, Rfl: 2    B-D UF III MINI PEN NEEDLES 31G X 5 MM misc, Inject 1 each under the skin into the appropriate area as directed Daily. E11.9, Disp: 100 each, Rfl: 2    Budeson-Glycopyrrol-Formoterol (Breztri Aerosphere) 160-9-4.8 MCG/ACT aerosol inhaler, Inhale 2 puffs 2 (Two) Times a Day. Rinse mouth after each use, Disp: 10.7 g, Rfl: 2    EPINEPHrine (EPIPEN) 0.3 MG/0.3ML solution auto-injector injection, , Disp: , Rfl:     famotidine (Pepcid) 20 MG tablet, Take 1 tablet by mouth At Night As Needed for Heartburn., Disp: 20 tablet, Rfl: 0    glucose blood test strip, Use to monitor glucose twice daily. Dx: E11.69, Disp: 100 each, Rfl: 3    glucose monitor monitoring kit, Use to monitor glucose twice a day. Dx: E11.69, Disp: 1 each, Rfl: 0    insulin detemir (Levemir FlexTouch) 100 UNIT/ML injection, Inject 20 Units under the skin into the appropriate area as directed Daily., Disp: 18 mL, Rfl: 2    losartan (COZAAR) 100 MG tablet, TAKE 1 TABLET BY MOUTH DAILY, Disp: 30 tablet, Rfl: 2    pantoprazole (PROTONIX) 40 MG EC tablet, Take 1 tablet by mouth 2 (Two) Times a Day Before Meals., Disp: 60 tablet, Rfl: 1    pregabalin (LYRICA) 200 MG capsule, Take 1 capsule by  mouth 2 (Two) Times a Day., Disp: 60 capsule, Rfl: 2    roflumilast (DALIRESP) 500 MCG tablet tablet, TAKE 1 TABLET BY MOUTH DAILY, Disp: 30 tablet, Rfl: 2    SITagliptin-metFORMIN HCl ER (Janumet XR)  MG tablet, TAKE 2 TABLETS BY MOUTH DAILY, Disp: 180 tablet, Rfl: 0    [START ON 1/19/2024] HYDROcodone-acetaminophen (NORCO) 5-325 MG per tablet, Take 1 tablet by mouth 3 (Three) Times a Day As Needed for Severe Pain., Disp: 90 tablet, Rfl: 0    [START ON 12/20/2023] HYDROcodone-acetaminophen (NORCO) 5-325 MG per tablet, Take 1 tablet by mouth 3 (Three) Times a Day As Needed for Severe Pain., Disp: 90 tablet, Rfl: 0    [START ON 11/21/2023] HYDROcodone-acetaminophen (NORCO) 5-325 MG per tablet, Take 1 tablet by mouth 3 (Three) Times a Day As Needed for Severe Pain., Disp: 90 tablet, Rfl: 0    ondansetron ODT (ZOFRAN-ODT) 4 MG disintegrating tablet, Place 1 tablet on the tongue Every 8 (Eight) Hours As Needed for Vomiting or Nausea. (Patient not taking: Reported on 11/15/2023), Disp: 10 tablet, Rfl: 0    promethazine (PHENERGAN) 12.5 MG tablet, Take 1 tablet by mouth Every 6 (Six) Hours As Needed for Nausea or Vomiting. (Patient not taking: Reported on 11/15/2023), Disp: 10 tablet, Rfl: 0    Review of Systems   Musculoskeletal:  Positive for arthralgias, back pain, myalgias and neck pain. Negative for gait problem and neck stiffness.   Neurological:  Positive for numbness. Negative for weakness.       Vitals:    11/15/23 1538   BP: 132/80   BP Location: Left arm   Patient Position: Sitting   Cuff Size: Adult   Pulse: 89   Resp: 16   SpO2: 97%   PainSc:   5   PainLoc: Back       Urine Drug Screen: 5/4/2023  Appropriate: Positive for marijuana    Objective   Physical Exam  Vitals and nursing note reviewed. Exam conducted with a chaperone present.   Constitutional:       General: He is not in acute distress.     Appearance: Normal appearance. He is normal weight.   Neurological:      General: No focal deficit  present.      Mental Status: He is alert. Mental status is at baseline.      Sensory: No sensory deficit.      Motor: No weakness.           Assessment & Plan   Problems Addressed this Visit          Other    Knee pain, left - Primary    Relevant Orders    Ambulatory Referral to Orthopedic Surgery     Other Visit Diagnoses       Diabetic polyneuropathy associated with type 2 diabetes mellitus        Relevant Medications    HYDROcodone-acetaminophen (NORCO) 5-325 MG per tablet (Start on 1/19/2024)    HYDROcodone-acetaminophen (NORCO) 5-325 MG per tablet (Start on 12/20/2023)    HYDROcodone-acetaminophen (NORCO) 5-325 MG per tablet (Start on 11/21/2023)    pregabalin (LYRICA) 200 MG capsule          Diagnoses         Codes Comments    Chronic pain of left knee    -  Primary ICD-10-CM: M25.562, G89.29  ICD-9-CM: 719.46, 338.29     Diabetic polyneuropathy associated with type 2 diabetes mellitus     ICD-10-CM: E11.42  ICD-9-CM: 250.60, 357.2             Plan:  Try switching tramadol to Norco 5 3 times daily  Continue Lyrica 200 mg twice daily  Inspect appropriate.  Did have trace THC on UDS  --- Follow-up 3 months           INSPECT REPORT    As part of the patient's treatment plan, I may be prescribing controlled substances. The patient has been made aware of appropriate use of such medications, including potential risk of somnolence, limited ability to drive and/or work safely, and the potential for dependence or overdose. It has also bee made clear that these medications are for use by this patient only, without concomitant use of alcohol or other substances unless prescribed.     Patient has completed prescribing agreement detailing terms of continued prescribing of controlled substances, including monitoring [facility] reports, urine drug screening, and pill counts if necessary. The patient is aware that inappropriate use will results in cessation of prescribing such medications.    INSPECT report has been reviewed and  scanned into the patient's chart.    As the clinician, I personally reviewed the INSPECT from 11/14/2023.    History and physical exam exhibit continued safe and appropriate use of controlled substances.      EMR Dragon/Transcription disclaimer:   Much of this encounter note is an electronic transcription/translation of spoken language to printed text. The electronic translation of spoken language may permit erroneous, or at times, nonsensical words or phrases to be inadvertently transcribed; Although I have reviewed the note for such errors, some may still exist.       .

## 2023-11-16 DIAGNOSIS — Z79.4 TYPE 2 DIABETES MELLITUS WITH OTHER SPECIFIED COMPLICATION, WITH LONG-TERM CURRENT USE OF INSULIN: ICD-10-CM

## 2023-11-16 DIAGNOSIS — E11.69 TYPE 2 DIABETES MELLITUS WITH OTHER SPECIFIED COMPLICATION, WITH LONG-TERM CURRENT USE OF INSULIN: ICD-10-CM

## 2023-11-16 LAB
LAB AP CASE REPORT: NORMAL
PATH REPORT.FINAL DX SPEC: NORMAL
PATH REPORT.GROSS SPEC: NORMAL

## 2023-11-16 RX ORDER — BLOOD SUGAR DIAGNOSTIC
STRIP MISCELLANEOUS 2 TIMES DAILY
OUTPATIENT
Start: 2023-11-16

## 2023-11-16 RX ORDER — LANCETS
EACH MISCELLANEOUS 2 TIMES DAILY
Qty: 100 EACH | Refills: 2 | OUTPATIENT
Start: 2023-11-16

## 2023-11-21 ENCOUNTER — TELEPHONE (OUTPATIENT)
Dept: PAIN MEDICINE | Facility: CLINIC | Age: 48
End: 2023-11-21
Payer: MEDICARE

## 2023-11-21 DIAGNOSIS — E11.42 DIABETIC POLYNEUROPATHY ASSOCIATED WITH TYPE 2 DIABETES MELLITUS: ICD-10-CM

## 2023-11-21 RX ORDER — HYDROCODONE BITARTRATE AND ACETAMINOPHEN 5; 325 MG/1; MG/1
1 TABLET ORAL 3 TIMES DAILY PRN
Qty: 90 TABLET | Refills: 0 | Status: SHIPPED | OUTPATIENT
Start: 2023-11-21

## 2023-11-21 NOTE — TELEPHONE ENCOUNTER
Caller: VIRI HARO    Relationship to patient: SELF    Best call back number:     Patient is needing: THE PATIENT STATED HIS PHARMACY TOLD HIM THAT THE RX STATED THIS MEDICATION WAS FOR DIABETED AND NOT THE BACK PAIN THAT DR COYNE HE IS TREATING HIM FOR.     HE ALSO SAID THE RX COULD ONLY BE FOR 7 DAYS FOR HIS THE FIRST PRESCRIPTION AND THEN 23 DAYS      [id number] [address] [place] [address] [facility] AT [facility] - [phone] [place] [phone] [facility] [phone]

## 2023-11-21 NOTE — TELEPHONE ENCOUNTER
Caller: PATIENT    Relationship to patient: SELF     Best call back number: [phone]    Patient is needing: PATIENT WAS ADVISED BY HIS PHARMACY, [facility] THAT THEY NEED AN ICD 10 CODE FOR HIS PRESCRIPTION. PATIENT STATED HE IS UNSURE THE NAME OF THE MEDICATION DUE TO DR. COYNE SWITCHING HIS MEDICATION AT HIS LAST APPT BUT RACHEL IS REQUESTING AN ICD 10 CODE BEFORE THEY WILL FILL HIS PRESCRIPTION. PATIENT IS NEEDING THIS TO BE TAKEN CARE OF ASAP. THANK YOU!

## 2023-12-05 ENCOUNTER — OFFICE VISIT (OUTPATIENT)
Dept: ORTHOPEDIC SURGERY | Facility: CLINIC | Age: 48
End: 2023-12-05
Payer: MEDICARE

## 2023-12-05 ENCOUNTER — HOSPITAL ENCOUNTER (OUTPATIENT)
Dept: GENERAL RADIOLOGY | Facility: HOSPITAL | Age: 48
Discharge: HOME | End: 2023-12-05
Admitting: FAMILY MEDICINE
Payer: MEDICARE

## 2023-12-05 ENCOUNTER — TELEPHONE (OUTPATIENT)
Dept: ORTHOPEDIC SURGERY | Facility: CLINIC | Age: 48
End: 2023-12-05

## 2023-12-05 VITALS — HEART RATE: 84 BPM | BODY MASS INDEX: 25.73 KG/M2 | OXYGEN SATURATION: 97 % | WEIGHT: 190 LBS | HEIGHT: 72 IN

## 2023-12-05 DIAGNOSIS — M17.12 PRIMARY OSTEOARTHRITIS OF LEFT KNEE: Primary | ICD-10-CM

## 2023-12-05 DIAGNOSIS — M25.562 LEFT KNEE PAIN, UNSPECIFIED CHRONICITY: Primary | ICD-10-CM

## 2023-12-05 PROCEDURE — 73562 X-RAY EXAM OF KNEE 3: CPT

## 2023-12-05 NOTE — PROGRESS NOTES
Primary Care Sports Medicine Office Visit Note     Patient ID: Aj Plaza is a 48 y.o. male.    Chief Complaint:  Chief Complaint   Patient presents with    Left Knee - Pain, Initial Evaluation     HPI:    Mr. Aj Plaza is a 48 y.o. male. The patient presents to the clinic today for left knee pain evaluation. He is accompanied by an adult female.    The patient had arthroscopies on his left knee in 2013. He was told that his left kneecap was worn out. He has been receiving injections in his left knee. He had an injection in his left knee approximately 6 months ago. When he is riding in his car, if he lets his left leg start resting towards the door, it feels like it is going to pop out, and he must pull it back. His left knee feels unstable, and he has had a few capsules of instability. Either of them will do that once or twice monthly. The injections make his left knee feel better; however, they have always got popping. 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 His last magnetic resonance imaging was in 2013. He had nerve damage, and he considered a lot of pain coming from his lower back.    Past Medical History:   Diagnosis Date    Hyperlipidemia     Hypertension     Knee pain     Low back pain     Neck pain     Type 2 diabetes mellitus        Past Surgical History:   Procedure Laterality Date    ENDOSCOPY N/A 05/05/2023    Procedure: ESOPHAGOGASTRODUODENOSCOPY WITH BIOPSY X1 AREA;  Surgeon: Antwan Gutierrez MD;  Location: [facility] ENDOSCOPY;  Service: Gastroenterology;  Laterality: N/A;  gastritis, duodenal ulcers, GERD    KNEE SURGERY Bilateral     scopes       Family History   Problem Relation Age of Onset    Diabetes Maternal Grandmother     Hypertension Maternal Grandmother     Hyperlipidemia Maternal Grandmother     Cancer Maternal Grandfather     Cancer Father      Social History     Occupational History    Not on file   Tobacco Use    Smoking status:  "Former     Packs/day: 2.00     Years: 30.00     Additional pack years: 0.00     Total pack years: 60.00     Types: Cigarettes     Quit date:      Years since quittin.9    Smokeless tobacco: Former   Vaping Use    Vaping Use: Never used   Substance and Sexual Activity    Alcohol use: Not Currently     Comment: new years    Drug use: Not Currently     Types: Marijuana     Comment: used in the past 22    Sexual activity: Not Currently     Partners: Female      Review of Systems   Constitutional:  Negative for activity change, fatigue and fever.   Musculoskeletal:  Positive for arthralgias.   Skin:  Negative for color change and rash.   Neurological:  Negative for numbness.     Objective:    Pulse 84   Ht 182.9 cm (72\")   Wt 86.2 kg (190 lb)   SpO2 97%   BMI 25.77 kg/m²     Physical Examination:  Physical Exam  Vitals and nursing note reviewed.   Constitutional:       General: He is not in acute distress.     Appearance: He is well-developed. He is not diaphoretic.   HENT:      Head: Normocephalic and atraumatic.   Eyes:      Conjunctiva/sclera: Conjunctivae normal.   Pulmonary:      Effort: Pulmonary effort is normal. No respiratory distress.   Musculoskeletal:      Left knee: No effusion.      Instability Tests: Medial Gabe test negative and lateral Gabe test negative.   Skin:     General: Skin is warm.      Capillary Refill: Capillary refill takes less than 2 seconds.   Neurological:      Mental Status: He is alert.       Left Ankle Exam     Range of Motion   The patient has normal left ankle ROM.       Left Knee Exam     Muscle Strength   The patient has normal left knee strength.    Tenderness   The patient is experiencing tenderness in the lateral joint line, medial joint line and patella.    Range of Motion   Extension:  normal   Flexion:  normal     Tests   Gabe:  Medial - negative Lateral - negative  Varus: negative Valgus: negative  Left knee patellar apprehension test: +patellar " grind testing, +dolly corrales testing.    Other   Erythema: absent  Sensation: normal  Pulse: present (distal to the knee, DP and PT palpable)  Swelling: none  Effusion: no effusion present        Imaging and other tests:  Three-view XR left knee yields very mild joint space narrowing consistent with early osteoarthritic disease.    Assessment and Plan:    1. Primary osteoarthritis of the left knee    After discussion of risks and benefits, the patient elected to proceed with corticosteroid injection to the left knee. The patient tolerated this procedure well without any complaints or problems. I recommended continuation of conservative management as previously, return to clinic in 3-6 months or sooner if symptoms recur.    Transcribed from ambient dictation for Aaron Bates II, DO by Karlie Lemos.  12/05/23   16:34 EST    Patient or patient representative verbalized consent to the visit recording.  I have personally performed the services described in this document as transcribed by the above individual, and it is both accurate and complete.    Disclaimer: Please note that areas of this note were completed with computer voice recognition software.  Quite often unanticipated grammatical, syntax, homophones, and other interpretive errors are inadvertently transcribed by the computer software. Please excuse any errors that have escaped final proofreading.

## 2023-12-06 RX ORDER — TRIAMCINOLONE ACETONIDE 40 MG/ML
80 INJECTION, SUSPENSION INTRA-ARTICULAR; INTRAMUSCULAR
Status: COMPLETED | OUTPATIENT
Start: 2023-12-06 | End: 2023-12-06

## 2023-12-06 RX ADMIN — TRIAMCINOLONE ACETONIDE 80 MG: 40 INJECTION, SUSPENSION INTRA-ARTICULAR; INTRAMUSCULAR at 08:21

## 2023-12-06 NOTE — PROGRESS NOTES
Procedure   Large Joint Arthrocentesis: L knee  Date/Time: 12/6/2023 8:21 AM  Consent given by: patient  Site marked: site marked  Timeout: Immediately prior to procedure a time out was called to verify the correct patient, procedure, equipment, support staff and site/side marked as required   Supporting Documentation  Indications: pain   Procedure Details  Location: knee - L knee  Preparation: Patient was prepped and draped in the usual sterile fashion  Needle size: 25 G  Approach: anteromedial  Medications administered: 80 mg triamcinolone acetonide 40 MG/ML (2cc of 1% lidocaine without epinepherine)  Patient tolerance: patient tolerated the procedure well with no immediate complications (Blood loss negligable, pt admits to immediate decrease in pain and improved ROM with gentle ambulation post injection.)

## 2023-12-08 ENCOUNTER — PATIENT ROUNDING (BHMG ONLY) (OUTPATIENT)
Dept: ORTHOPEDIC SURGERY | Facility: CLINIC | Age: 48
End: 2023-12-08
Payer: MEDICARE

## 2023-12-27 ENCOUNTER — TELEPHONE (OUTPATIENT)
Dept: PAIN MEDICINE | Facility: CLINIC | Age: 48
End: 2023-12-27
Payer: MEDICARE

## 2023-12-27 DIAGNOSIS — E11.42 DIABETIC POLYNEUROPATHY ASSOCIATED WITH TYPE 2 DIABETES MELLITUS: ICD-10-CM

## 2023-12-27 RX ORDER — HYDROCODONE BITARTRATE AND ACETAMINOPHEN 5; 325 MG/1; MG/1
1 TABLET ORAL 3 TIMES DAILY PRN
Qty: 90 TABLET | Refills: 0 | Status: SHIPPED | OUTPATIENT
Start: 2023-12-27

## 2023-12-27 NOTE — TELEPHONE ENCOUNTER
Pharmacy needs a proper diagnosis code to dispense norc other than type 2 diabetes. Rx Refill Note  Requested Prescriptions     Pending Prescriptions Disp Refills    HYDROcodone-acetaminophen (NORCO) 5-325 MG per tablet 90 tablet 0     Sig: Take 1 tablet by mouth 3 (Three) Times a Day As Needed for Severe Pain.      Last office visit with prescribing clinician: 11/15/2023   Last telemedicine visit with prescribing clinician: Visit date not found   Next office visit with prescribing clinician: 2/14/2024                         Would you like a call back once the refill request has been completed: [] Yes [] No    If the office needs to give you a call back, can they leave a voicemail: [] Yes [] No    Criss Jay MA  12/27/23, 08:47 EST

## 2024-01-26 DIAGNOSIS — Z79.4 TYPE 2 DIABETES MELLITUS WITH OTHER SPECIFIED COMPLICATION, WITH LONG-TERM CURRENT USE OF INSULIN: ICD-10-CM

## 2024-01-26 DIAGNOSIS — E11.69 TYPE 2 DIABETES MELLITUS WITH OTHER SPECIFIED COMPLICATION, WITH LONG-TERM CURRENT USE OF INSULIN: ICD-10-CM

## 2024-01-26 RX ORDER — FLURBIPROFEN SODIUM 0.3 MG/ML
SOLUTION/ DROPS OPHTHALMIC
Qty: 100 EACH | Refills: 2 | OUTPATIENT
Start: 2024-01-26

## 2024-02-02 DIAGNOSIS — E11.69 TYPE 2 DIABETES MELLITUS WITH OTHER SPECIFIED COMPLICATION, WITH LONG-TERM CURRENT USE OF INSULIN: ICD-10-CM

## 2024-02-02 DIAGNOSIS — Z79.4 TYPE 2 DIABETES MELLITUS WITH OTHER SPECIFIED COMPLICATION, WITH LONG-TERM CURRENT USE OF INSULIN: ICD-10-CM

## 2024-02-02 RX ORDER — SITAGLIPTIN AND METFORMIN HYDROCHLORIDE 1000; 50 MG/1; MG/1
2 TABLET, FILM COATED, EXTENDED RELEASE ORAL DAILY
Qty: 180 TABLET | Refills: 0 | OUTPATIENT
Start: 2024-02-02

## 2024-02-14 ENCOUNTER — OFFICE VISIT (OUTPATIENT)
Dept: PAIN MEDICINE | Facility: CLINIC | Age: 49
End: 2024-02-14
Payer: MEDICARE

## 2024-02-14 VITALS
DIASTOLIC BLOOD PRESSURE: 94 MMHG | BODY MASS INDEX: 29.1 KG/M2 | SYSTOLIC BLOOD PRESSURE: 166 MMHG | WEIGHT: 214.6 LBS | RESPIRATION RATE: 16 BRPM | OXYGEN SATURATION: 97 % | HEART RATE: 79 BPM

## 2024-02-14 DIAGNOSIS — M25.551 BILATERAL HIP PAIN: ICD-10-CM

## 2024-02-14 DIAGNOSIS — E11.42 DIABETIC POLYNEUROPATHY ASSOCIATED WITH TYPE 2 DIABETES MELLITUS: ICD-10-CM

## 2024-02-14 DIAGNOSIS — M51.26 DISPLACEMENT OF LUMBAR INTERVERTEBRAL DISC WITHOUT MYELOPATHY: Primary | ICD-10-CM

## 2024-02-14 DIAGNOSIS — M25.552 BILATERAL HIP PAIN: ICD-10-CM

## 2024-02-14 RX ORDER — HYDROCODONE BITARTRATE AND ACETAMINOPHEN 5; 325 MG/1; MG/1
1 TABLET ORAL 3 TIMES DAILY PRN
Qty: 90 TABLET | Refills: 0 | Status: SHIPPED | OUTPATIENT
Start: 2024-03-23

## 2024-02-14 RX ORDER — PREGABALIN 300 MG/1
300 CAPSULE ORAL 2 TIMES DAILY
Qty: 60 CAPSULE | Refills: 0 | Status: SHIPPED | OUTPATIENT
Start: 2024-02-14

## 2024-02-14 RX ORDER — HYDROCODONE BITARTRATE AND ACETAMINOPHEN 5; 325 MG/1; MG/1
1 TABLET ORAL 3 TIMES DAILY PRN
Qty: 90 TABLET | Refills: 0 | Status: SHIPPED | OUTPATIENT
Start: 2024-02-24

## 2024-03-05 ENCOUNTER — OFFICE VISIT (OUTPATIENT)
Dept: ORTHOPEDIC SURGERY | Facility: CLINIC | Age: 49
End: 2024-03-05
Payer: MEDICARE

## 2024-03-05 VITALS — WEIGHT: 214 LBS | OXYGEN SATURATION: 97 % | HEIGHT: 72 IN | HEART RATE: 74 BPM | BODY MASS INDEX: 28.99 KG/M2

## 2024-03-05 DIAGNOSIS — M17.0 BILATERAL PRIMARY OSTEOARTHRITIS OF KNEE: Primary | ICD-10-CM

## 2024-03-05 DIAGNOSIS — M25.561 RIGHT KNEE PAIN, UNSPECIFIED CHRONICITY: ICD-10-CM

## 2024-03-05 PROCEDURE — 20610 DRAIN/INJ JOINT/BURSA W/O US: CPT | Performed by: FAMILY MEDICINE

## 2024-03-05 PROCEDURE — 99213 OFFICE O/P EST LOW 20 MIN: CPT | Performed by: FAMILY MEDICINE

## 2024-03-05 PROCEDURE — 1159F MED LIST DOCD IN RCRD: CPT | Performed by: FAMILY MEDICINE

## 2024-03-05 PROCEDURE — 1160F RVW MEDS BY RX/DR IN RCRD: CPT | Performed by: FAMILY MEDICINE

## 2024-03-05 RX ORDER — TRIAMCINOLONE ACETONIDE 40 MG/ML
80 INJECTION, SUSPENSION INTRA-ARTICULAR; INTRAMUSCULAR
Status: COMPLETED | OUTPATIENT
Start: 2024-03-05 | End: 2024-03-05

## 2024-03-05 RX ADMIN — TRIAMCINOLONE ACETONIDE 80 MG: 40 INJECTION, SUSPENSION INTRA-ARTICULAR; INTRAMUSCULAR at 16:23

## 2024-03-05 NOTE — PROGRESS NOTES
Primary Care Sports Medicine Office Visit Note     Patient ID: Aj Plaza is a 48 y.o. male.    Chief Complaint:  Chief Complaint   Patient presents with    Left Knee - Follow-up, Pain    Right Knee - Pain, Initial Evaluation     HPI:    Mr. Aj Plaza is a 48 y.o. male. The patient presents to the clinic today for follow up evaluation of left knee status post corticosteroid injection in 2023, 2.5 months ago. He would also like to discuss his right knee today. An adult female accompanies him.    His left knee started to bother him again 2 to 3 weeks ago when he was riding in a car. It feels like it is going to lock up or pop again. His bilateral knees have been hurting him. Dr. Demetrius Quigley had scopes done on his bilateral knees in  and started injections. His last injection in the right knee was in .      Past Medical History:   Diagnosis Date    Hyperlipidemia     Hypertension     Knee pain     Low back pain     Neck pain     Type 2 diabetes mellitus        Past Surgical History:   Procedure Laterality Date    ENDOSCOPY N/A 2023    Procedure: ESOPHAGOGASTRODUODENOSCOPY WITH BIOPSY X1 AREA;  Surgeon: Antwan Gutierrez MD;  Location: [facility] ENDOSCOPY;  Service: Gastroenterology;  Laterality: N/A;  gastritis, duodenal ulcers, GERD    KNEE SURGERY Bilateral     scopes       Family History   Problem Relation Age of Onset    Diabetes Maternal Grandmother     Hypertension Maternal Grandmother     Hyperlipidemia Maternal Grandmother     Cancer Maternal Grandfather     Cancer Father      Social History     Occupational History    Not on file   Tobacco Use    Smoking status: Former     Current packs/day: 0.00     Average packs/day: 2.0 packs/day for 30.0 years (60.0 ttl pk-yrs)     Types: Cigarettes     Start date:      Quit date: 2017     Years since quittin.1    Smokeless tobacco: Former   Vaping Use    Vaping status: Never Used   Substance and Sexual Activity    Alcohol use: Not  "Currently     Comment: new years    Drug use: Not Currently     Types: Marijuana     Comment: used in the past 4/20/22    Sexual activity: Not Currently     Partners: Female      Review of Systems   Constitutional:  Negative for activity change, fatigue and fever.   Musculoskeletal:  Positive for arthralgias.   Skin:  Negative for color change and rash.   Neurological:  Negative for numbness.     Objective:    Pulse 74   Ht 182.9 cm (72\")   Wt 97.1 kg (214 lb)   SpO2 97%   BMI 29.02 kg/m²     Physical Examination:  Physical Exam  Vitals and nursing note reviewed.   Constitutional:       General: He is not in acute distress.     Appearance: He is well-developed. He is not diaphoretic.   HENT:      Head: Normocephalic and atraumatic.   Eyes:      Conjunctiva/sclera: Conjunctivae normal.   Pulmonary:      Effort: Pulmonary effort is normal. No respiratory distress.   Musculoskeletal:      Right knee: No effusion.      Instability Tests: Medial Gabe test negative and lateral Gabe test negative.      Left knee: No effusion.      Instability Tests: Medial Gabe test negative and lateral Gabe test negative.   Skin:     General: Skin is warm.      Capillary Refill: Capillary refill takes less than 2 seconds.   Neurological:      Mental Status: He is alert.       Right Ankle Exam     Range of Motion   The patient has normal right ankle ROM.       Left Ankle Exam     Range of Motion   The patient has normal left ankle ROM.       Right Knee Exam     Muscle Strength   The patient has normal right knee strength.    Tenderness   The patient is experiencing tenderness in the lateral joint line, medial joint line and patella.    Range of Motion   Extension:  normal   Flexion:  normal     Tests   Gabe:  Medial - negative Lateral - negative  Varus: negative Valgus: negative  Right knee patellar apprehension test: +patellar grind, +dolly corrales.    Other   Erythema: absent  Sensation: normal  Pulse: present " (distal to the knee, DP and PT palpable)  Swelling: none  Effusion: no effusion present      Left Knee Exam     Muscle Strength   The patient has normal left knee strength.    Tenderness   The patient is experiencing tenderness in the lateral joint line, medial joint line and patella.    Range of Motion   Extension:  normal   Flexion:  normal     Tests   Gabe:  Medial - negative Lateral - negative  Varus: negative Valgus: negative  Left knee patellar apprehension test: +patellar grind testing, +dolly corrales testing.    Other   Erythema: absent  Sensation: normal  Pulse: present (distal to the knee, DP and PT palpable)  Swelling: none  Effusion: no effusion present          Imaging and other tests:  Right knee x-rays were reviewed with the patient.    Assessment and Plan:    1. Right knee pain, unspecified chronicity  - XR Knee 3 View Right    2. Bilateral primary osteoarthritis of knee  - Visco Treatment; Future    After discussion of risks and benefits, the patient elected to proceed with corticosteroid injection to the bilateral knees.  The patient tolerated this procedure well without any complaints or problems.  I recommended continuation of conservative management as previous. We also discussed hyaluronic acid injection today. I will start PA because bilateral knees have now failed corticosteroid injections, but also over the counter medications, prescription medications, physical therapy, as well. RTC if PA is achieved for hyaluronic acid injection at that time.    Transcribed from ambient dictation for Aaron Bates II, DO by Karlie Lemos.  03/05/24   15:56 EST    Patient or patient representative verbalized consent to the visit recording.  I have personally performed the services described in this document as transcribed by the above individual, and it is both accurate and complete.    Disclaimer: Please note that areas of this note were completed with computer voice recognition software.  Quite  often unanticipated grammatical, syntax, homophones, and other interpretive errors are inadvertently transcribed by the computer software. Please excuse any errors that have escaped final proofreading.

## 2024-03-05 NOTE — PROGRESS NOTES
Procedure   - Large Joint Arthrocentesis: bilateral knee on 3/5/2024 4:23 PM  Indications: pain  Details: 22 G needle, anteromedial approach  Medications (Right): 80 mg triamcinolone acetonide 40 MG/ML  Medications (Left): 80 mg triamcinolone acetonide 40 MG/ML  Outcome: tolerated well, no immediate complications  Procedure, treatment alternatives, risks and benefits explained, specific risks discussed. Consent was given by the patient. Immediately prior to procedure a time out was called to verify the correct patient, procedure, equipment, support staff and site/side marked as required. Patient was prepped and draped in the usual sterile fashion.

## 2024-03-13 ENCOUNTER — OFFICE VISIT (OUTPATIENT)
Dept: PAIN MEDICINE | Facility: CLINIC | Age: 49
End: 2024-03-13
Payer: MEDICARE

## 2024-03-13 VITALS
OXYGEN SATURATION: 97 % | RESPIRATION RATE: 16 BRPM | DIASTOLIC BLOOD PRESSURE: 94 MMHG | BODY MASS INDEX: 28.59 KG/M2 | HEART RATE: 87 BPM | SYSTOLIC BLOOD PRESSURE: 144 MMHG | WEIGHT: 210.8 LBS

## 2024-03-13 DIAGNOSIS — Z79.899 HIGH RISK MEDICATION USE: Primary | ICD-10-CM

## 2024-03-13 DIAGNOSIS — E11.42 DIABETIC POLYNEUROPATHY ASSOCIATED WITH TYPE 2 DIABETES MELLITUS: ICD-10-CM

## 2024-03-13 PROCEDURE — 3080F DIAST BP >= 90 MM HG: CPT | Performed by: STUDENT IN AN ORGANIZED HEALTH CARE EDUCATION/TRAINING PROGRAM

## 2024-03-13 PROCEDURE — 1159F MED LIST DOCD IN RCRD: CPT | Performed by: STUDENT IN AN ORGANIZED HEALTH CARE EDUCATION/TRAINING PROGRAM

## 2024-03-13 PROCEDURE — 3077F SYST BP >= 140 MM HG: CPT | Performed by: STUDENT IN AN ORGANIZED HEALTH CARE EDUCATION/TRAINING PROGRAM

## 2024-03-13 PROCEDURE — 1160F RVW MEDS BY RX/DR IN RCRD: CPT | Performed by: STUDENT IN AN ORGANIZED HEALTH CARE EDUCATION/TRAINING PROGRAM

## 2024-03-13 PROCEDURE — 99214 OFFICE O/P EST MOD 30 MIN: CPT | Performed by: STUDENT IN AN ORGANIZED HEALTH CARE EDUCATION/TRAINING PROGRAM

## 2024-03-13 PROCEDURE — 1125F AMNT PAIN NOTED PAIN PRSNT: CPT | Performed by: STUDENT IN AN ORGANIZED HEALTH CARE EDUCATION/TRAINING PROGRAM

## 2024-03-13 RX ORDER — PREGABALIN 300 MG/1
300 CAPSULE ORAL 2 TIMES DAILY
Qty: 60 CAPSULE | Refills: 0 | Status: SHIPPED | OUTPATIENT
Start: 2024-03-13

## 2024-03-13 RX ORDER — HYDROCODONE BITARTRATE AND ACETAMINOPHEN 5; 325 MG/1; MG/1
1 TABLET ORAL 3 TIMES DAILY PRN
Qty: 90 TABLET | Refills: 0 | Status: SHIPPED | OUTPATIENT
Start: 2024-04-22

## 2024-03-13 RX ORDER — HYDROCODONE BITARTRATE AND ACETAMINOPHEN 5; 325 MG/1; MG/1
1 TABLET ORAL 3 TIMES DAILY PRN
Qty: 90 TABLET | Refills: 0 | Status: SHIPPED | OUTPATIENT
Start: 2024-03-23

## 2024-03-13 RX ORDER — HYDROCODONE BITARTRATE AND ACETAMINOPHEN 5; 325 MG/1; MG/1
1 TABLET ORAL 3 TIMES DAILY PRN
Qty: 90 TABLET | Refills: 0 | Status: SHIPPED | OUTPATIENT
Start: 2024-05-21

## 2024-03-13 NOTE — PROGRESS NOTES
Patient screened positive for depression based on a PHQ-9 score of 0 on 2/14/2024. Follow-up recommendations include: Elevated PHQ score reflective of acute illness, not depression     CHIEF COMPLAINT  Chief Complaint   Patient presents with    Back Pain    Knee Pain     Left knee, LD Platter  03/13/24 [id number]       Primary Care  Eufemia Malcolm PA Subjective   Aj Plaza is a 48 y.o. male  who presents for generalized pain.  He states that he has had pain in the neck, mid back, low back, knees for many years.  It appears that he was previously being treated at an outside pain clinic approximately 10 years ago with a combination of narcotics however he is no longer at the clinic for unknown reasons.  He states that most recently, he was smoking marijuana for pain control however he has subsequently developed lung issues and can no longer smoke marijuana.  He presents today to establish care.  He describes pain essentially all over his body.  He cannot identify any specific areas of pain which are of more concern than the others.    Back Pain  Associated symptoms include numbness. Pertinent negatives include no weakness.   Pain  Associated symptoms include arthralgias, myalgias, neck pain and numbness. Pertinent negatives include no weakness.   Neck Pain   Associated symptoms include numbness. Pertinent negatives include no weakness.   Knee Pain   Associated symptoms include numbness.        Location: Generalized pain  Onset: Many years ago  Duration: Progressively worsening  Timing: Constant throughout the day  Quality: Numbness and tingling in the hands with sharp, stabbing pains in the legs and feet  Severity: Today: 5       Last Week: 6       Worst: 8  Modifying Factors: The pain is fairly constant without any exacerbating or relieving factors    Physical Therapy: no    Interval Update 03/13/2024: Doing well with hydrocodone.  Doing well with the increased dose of Lyrica.  He reports that his hip pain is  mostly resolved.    The following portions of the patient's history were reviewed and updated as appropriate: allergies, current medications, past family history, past medical history, past social history, past surgical history and problem list.      Current Outpatient Medications:     Accu-Chek Softclix Lancets lancets, 1 each by Other route 2 (Two) Times a Day. Use to monitor glucose twice daily. E11.9, Disp: 100 each, Rfl: 2    albuterol sulfate  (90 Base) MCG/ACT inhaler, Inhale 2 puffs Every 4 (Four) Hours As Needed for Wheezing., Disp: 6.7 g, Rfl: 2    amLODIPine (NORVASC) 10 MG tablet, Take 1 tablet by mouth Daily., Disp: 30 tablet, Rfl: 0    atorvastatin (LIPITOR) 40 MG tablet, TAKE 1 TABLET BY MOUTH EVERY NIGHT, Disp: 30 tablet, Rfl: 2    B-D UF III MINI PEN NEEDLES 31G X 5 MM misc, Inject 1 each under the skin into the appropriate area as directed Daily. E11.9, Disp: 100 each, Rfl: 2    Budeson-Glycopyrrol-Formoterol (Breztri Aerosphere) 160-9-4.8 MCG/ACT aerosol inhaler, Inhale 2 puffs 2 (Two) Times a Day. Rinse mouth after each use, Disp: 10.7 g, Rfl: 2    EPINEPHrine (EPIPEN) 0.3 MG/0.3ML solution auto-injector injection, , Disp: , Rfl:     famotidine (Pepcid) 20 MG tablet, Take 1 tablet by mouth At Night As Needed for Heartburn., Disp: 20 tablet, Rfl: 0    glucose blood test strip, Use to monitor glucose twice daily. Dx: E11.69, Disp: 100 each, Rfl: 3    glucose monitor monitoring kit, Use to monitor glucose twice a day. Dx: E11.69, Disp: 1 each, Rfl: 0    [START ON 5/21/2024] HYDROcodone-acetaminophen (NORCO) 5-325 MG per tablet, Take 1 tablet by mouth 3 (Three) Times a Day As Needed for Severe Pain., Disp: 90 tablet, Rfl: 0    [START ON 4/22/2024] HYDROcodone-acetaminophen (NORCO) 5-325 MG per tablet, Take 1 tablet by mouth 3 (Three) Times a Day As Needed for Severe Pain., Disp: 90 tablet, Rfl: 0    [START ON 3/23/2024] HYDROcodone-acetaminophen (NORCO) 5-325 MG per tablet, Take 1 tablet by  mouth 3 (Three) Times a Day As Needed for Severe Pain., Disp: 90 tablet, Rfl: 0    insulin detemir (Levemir FlexTouch) 100 UNIT/ML injection, Inject 20 Units under the skin into the appropriate area as directed Daily., Disp: 18 mL, Rfl: 2    losartan (COZAAR) 100 MG tablet, TAKE 1 TABLET BY MOUTH DAILY, Disp: 30 tablet, Rfl: 2    ondansetron ODT (ZOFRAN-ODT) 4 MG disintegrating tablet, Place 1 tablet on the tongue Every 8 (Eight) Hours As Needed for Vomiting or Nausea., Disp: 10 tablet, Rfl: 0    pantoprazole (PROTONIX) 40 MG EC tablet, Take 1 tablet by mouth 2 (Two) Times a Day Before Meals., Disp: 60 tablet, Rfl: 1    pregabalin (LYRICA) 300 MG capsule, Take 1 capsule by mouth 2 (Two) Times a Day., Disp: 60 capsule, Rfl: 0    promethazine (PHENERGAN) 12.5 MG tablet, Take 1 tablet by mouth Every 6 (Six) Hours As Needed for Nausea or Vomiting., Disp: 10 tablet, Rfl: 0    roflumilast (DALIRESP) 500 MCG tablet tablet, TAKE 1 TABLET BY MOUTH DAILY, Disp: 30 tablet, Rfl: 2    SITagliptin-metFORMIN HCl ER (Janumet XR)  MG tablet, TAKE 2 TABLETS BY MOUTH DAILY, Disp: 180 tablet, Rfl: 0    Review of Systems   Musculoskeletal:  Positive for arthralgias, back pain, myalgias and neck pain. Negative for gait problem and neck stiffness.   Neurological:  Positive for numbness. Negative for weakness.       Vitals:    03/13/24 1542   BP: 144/94   Pulse: 87   Resp: 16   SpO2: 97%   Weight: 95.6 kg (210 lb 12.8 oz)   PainSc:   5       Urine Drug Screen: 3/13/2024  Appropriate: Pending    Objective   Physical Exam  Vitals and nursing note reviewed. Exam conducted with a chaperone present.   Constitutional:       General: He is not in acute distress.     Appearance: Normal appearance. He is normal weight.   Neurological:      General: No focal deficit present.      Mental Status: He is alert. Mental status is at baseline.      Sensory: No sensory deficit.      Motor: No weakness.           Assessment & Plan   Problems Addressed  this Visit    None  Visit Diagnoses       High risk medication use    -  Primary    Relevant Orders    Urine Drug Screen - Urine, Clean Catch    Diabetic polyneuropathy associated with type 2 diabetes mellitus        Relevant Medications    HYDROcodone-acetaminophen (NORCO) 5-325 MG per tablet (Start on 5/21/2024)    HYDROcodone-acetaminophen (NORCO) 5-325 MG per tablet (Start on 4/22/2024)    HYDROcodone-acetaminophen (NORCO) 5-325 MG per tablet (Start on 3/23/2024)    pregabalin (LYRICA) 300 MG capsule          Diagnoses         Codes Comments    High risk medication use    -  Primary ICD-10-CM: Z79.899  ICD-9-CM: V58.69     Diabetic polyneuropathy associated with type 2 diabetes mellitus     ICD-10-CM: E11.42  ICD-9-CM: 250.60, 357.2             Plan:  Continue hydrocodone 5 mg 3 times daily  Continue Lyrica 3 mg twice daily  Inspect appropriate.  UDS today  No significant degenerative change seen in the films  --- Follow-up 3 months           INSPECT REPORT    As part of the patient's treatment plan, I may be prescribing controlled substances. The patient has been made aware of appropriate use of such medications, including potential risk of somnolence, limited ability to drive and/or work safely, and the potential for dependence or overdose. It has also bee made clear that these medications are for use by this patient only, without concomitant use of alcohol or other substances unless prescribed.     Patient has completed prescribing agreement detailing terms of continued prescribing of controlled substances, including monitoring [facility] reports, urine drug screening, and pill counts if necessary. The patient is aware that inappropriate use will results in cessation of prescribing such medications.    INSPECT report has been reviewed and scanned into the patient's chart.    As the clinician, I personally reviewed the INSPECT from 3/12/2024.    History and physical exam exhibit continued safe and appropriate use  of controlled substances.      EMR Dragon/Transcription disclaimer:   Much of this encounter note is an electronic transcription/translation of spoken language to printed text. The electronic translation of spoken language may permit erroneous, or at times, nonsensical words or phrases to be inadvertently transcribed; Although I have reviewed the note for such errors, some may still exist.       .

## 2024-03-26 ENCOUNTER — OFFICE VISIT (OUTPATIENT)
Dept: ORTHOPEDIC SURGERY | Facility: CLINIC | Age: 49
End: 2024-03-26
Payer: MEDICARE

## 2024-03-26 VITALS — OXYGEN SATURATION: 97 % | HEIGHT: 72 IN | WEIGHT: 204 LBS | HEART RATE: 94 BPM | BODY MASS INDEX: 27.63 KG/M2

## 2024-03-26 DIAGNOSIS — M17.0 PRIMARY OSTEOARTHRITIS OF BOTH KNEES: Primary | ICD-10-CM

## 2024-03-29 NOTE — PROGRESS NOTES
Primary Care Sports Medicine Office Visit Note    Patient ID: Aj Plaza is a 48 y.o. male.    Chief Complaint:  Chief Complaint   Patient presents with    Left Knee - Follow-up, Pain     Durolane inj     Right Knee - Follow-up, Pain     Durolane Inj        History of Present Illness  The patient presents for evaluation of arthritis. He is accompanied by an adult female.    He had a steroid injection earlier this month, which actually hurt him for a little while. He feels like his leg is going to lock up. He has had problems with his other leg for years where it will pop.       Past Medical History:   Diagnosis Date    Hyperlipidemia     Hypertension     Knee pain     Low back pain     Neck pain     Type 2 diabetes mellitus        Past Surgical History:   Procedure Laterality Date    ENDOSCOPY N/A 2023    Procedure: ESOPHAGOGASTRODUODENOSCOPY WITH BIOPSY X1 AREA;  Surgeon: Antwan Gutierrez MD;  Location: [facility] ENDOSCOPY;  Service: Gastroenterology;  Laterality: N/A;  gastritis, duodenal ulcers, GERD    KNEE SURGERY Bilateral     scopes       Family History   Problem Relation Age of Onset    Diabetes Maternal Grandmother     Hypertension Maternal Grandmother     Hyperlipidemia Maternal Grandmother     Cancer Maternal Grandfather     Cancer Father      Social History     Occupational History    Not on file   Tobacco Use    Smoking status: Former     Current packs/day: 0.00     Average packs/day: 2.0 packs/day for 30.0 years (60.0 ttl pk-yrs)     Types: Cigarettes     Start date:      Quit date: 2017     Years since quittin.2    Smokeless tobacco: Former   Vaping Use    Vaping status: Never Used   Substance and Sexual Activity    Alcohol use: Not Currently     Comment: new years    Drug use: Not Currently     Types: Marijuana     Comment: used in the past 22    Sexual activity: Not Currently     Partners: Female        Review of Systems:  Review of Systems   Constitutional:  Negative  "for activity change, fatigue and fever.   Musculoskeletal:  Positive for arthralgias.   Skin:  Negative for color change and rash.   Neurological:  Negative for numbness.     Objective:  Physical Exam  Pulse 94   Ht 182.9 cm (72\")   Wt 92.5 kg (204 lb)   SpO2 97%   BMI 27.67 kg/m²   Vitals and nursing note reviewed.   Constitutional:       General: he  is not in acute distress.     Appearance: he is well-developed. He is not diaphoretic.   HENT:      Head: Normocephalic and atraumatic.   Eyes:      Conjunctiva/sclera: Conjunctivae normal.   Pulmonary:      Effort: Pulmonary effort is normal. No respiratory distress.   Skin:     General: Skin is warm.      Capillary Refill: Capillary refill takes less than 2 seconds.   Neurological:      Mental Status: he is alert.     Physical Exam    Left Ankle Exam     Range of Motion   The patient has normal left ankle ROM.       Left Knee Exam     Muscle Strength   The patient has normal left knee strength.    Tenderness   The patient is experiencing tenderness in the lateral joint line, medial joint line and patella.    Range of Motion   Extension:  normal   Flexion:  normal     Tests   Gabe:  Medial - negative Lateral - negative  Varus: negative Valgus: negative  Left knee patellar apprehension test: +patellar grind testing, +dolly corrales testing.    Other   Erythema: absent  Sensation: normal  Pulse: present (distal to the knee, DP and PT palpable)  Swelling: none  Effusion: no effusion present            Results  No new imaging today.    Assessment & Plan    1. Primary osteoarthritis of left knee    After discussion of risks and benefits, the patient elected to proceed with hyaluronic acid injection to bilateral knees.  The patient tolerated this procedure well without any complaints or problems.  I recommended continuation of conservative management as previous, RTC in 3-6 months or sooner if symptoms recur.      Aaron GAO \"Chance\" Paulo LLAMAS, DO, " CAQSM  03/29/24  08:37 EDT    Disclaimer: Please note that areas of this note were completed with computer voice recognition software.  Quite often unanticipated grammatical, syntax, homophones, and other interpretive errors are inadvertently transcribed by the computer software. Please excuse any errors that have escaped final proofreading.    Patient or patient representative verbalized consent for the use of Ambient Listening during the visit with  Aaron Bates II, DO for chart documentation. 08:37 EDT 03/29/24

## 2024-03-29 NOTE — PROGRESS NOTES
Procedure   - Large Joint Arthrocentesis: bilateral knee on 3/26/2024 4:39 PM  Indications: pain  Details: 22 G needle, anteromedial approach  Medications (Right): 60 mg Sodium Hyaluronate 60 MG/3ML  Medications (Left): 60 mg Sodium Hyaluronate 60 MG/3ML  Outcome: tolerated well, no immediate complications  Procedure, treatment alternatives, risks and benefits explained, specific risks discussed. Consent was given by the patient. Immediately prior to procedure a time out was called to verify the correct patient, procedure, equipment, support staff and site/side marked as required. Patient was prepped and draped in the usual sterile fashion.

## 2024-04-15 ENCOUNTER — TELEPHONE (OUTPATIENT)
Dept: PAIN MEDICINE | Facility: CLINIC | Age: 49
End: 2024-04-15
Payer: MEDICARE

## 2024-04-15 DIAGNOSIS — E11.42 DIABETIC POLYNEUROPATHY ASSOCIATED WITH TYPE 2 DIABETES MELLITUS: ICD-10-CM

## 2024-04-15 RX ORDER — PREGABALIN 300 MG/1
300 CAPSULE ORAL 2 TIMES DAILY
Qty: 60 CAPSULE | Refills: 3 | Status: SHIPPED | OUTPATIENT
Start: 2024-04-15

## 2024-04-15 NOTE — TELEPHONE ENCOUNTER
Caller: Aj Plaza    Relationship: Self    Best call back number: [phone]    Requested Prescriptions: LYRICA 300 MG  Requested Prescriptions      No prescriptions requested or ordered in this encounter        Pharmacy where request should be sent:  [facility] [phone]    Last office visit with prescribing clinician: 3/13/2024     Next office visit with prescribing clinician: 6/12/2024     Additional details provided by patient: PATIENT WILL BE OUT TOMORROW, WOULD LIKE A 90 DAY SUPPLY IF POSSIBLE    Does the patient have less than a 3 day supply:  [x] Yes  [] No

## 2024-04-24 ENCOUNTER — TELEPHONE (OUTPATIENT)
Dept: PAIN MEDICINE | Facility: CLINIC | Age: 49
End: 2024-04-24
Payer: MEDICARE

## 2024-04-24 DIAGNOSIS — E11.42 DIABETIC POLYNEUROPATHY ASSOCIATED WITH TYPE 2 DIABETES MELLITUS: ICD-10-CM

## 2024-04-24 RX ORDER — HYDROCODONE BITARTRATE AND ACETAMINOPHEN 5; 325 MG/1; MG/1
1 TABLET ORAL 3 TIMES DAILY PRN
Qty: 90 TABLET | Refills: 0 | OUTPATIENT
Start: 2024-04-24

## 2024-04-24 NOTE — TELEPHONE ENCOUNTER
Caller: Aj Plaza    Relationship: Self    Best call back number: [phone] (home)     Requested Prescriptions:   Requested Prescriptions     Pending Prescriptions Disp Refills    HYDROcodone-acetaminophen (NORCO) 5-325 MG per tablet 90 tablet 0     Sig: Take 1 tablet by mouth 3 (Three) Times a Day As Needed for Severe Pain.        Pharmacy where request should be sent: [id number] [address] AT [facility] [phone] [facility] [phone]      Last office visit with prescribing clinician: 3/13/2024   Last telemedicine visit with prescribing clinician: Visit date not found   Next office visit with prescribing clinician: 6/12/2024     Additional details provided by patient: PT WILL BE OUT IN 2 DAYS - HE WOULD LIKE TO KNOW WHY HIS PHARMACY ONLY GIVES HIM 30 DAYS AT A TIME INSTEAD OF A 90 DAY SUPPLY    Does the patient have less than a 3 day supply:  [x] Yes  [] No    Would you like a call back once the refill request has been completed: [x] Yes [] No    If the office needs to give you a call back, can they leave a voicemail: [x] Yes [] No    Wily Kaiser   04/24/24 13:12 EDT

## 2024-06-12 ENCOUNTER — OFFICE VISIT (OUTPATIENT)
Dept: PAIN MEDICINE | Facility: CLINIC | Age: 49
End: 2024-06-12
Payer: MEDICARE

## 2024-06-12 VITALS
DIASTOLIC BLOOD PRESSURE: 94 MMHG | WEIGHT: 222 LBS | HEART RATE: 89 BPM | BODY MASS INDEX: 30.11 KG/M2 | OXYGEN SATURATION: 96 % | RESPIRATION RATE: 16 BRPM | SYSTOLIC BLOOD PRESSURE: 147 MMHG

## 2024-06-12 DIAGNOSIS — E11.42 DIABETIC POLYNEUROPATHY ASSOCIATED WITH TYPE 2 DIABETES MELLITUS: ICD-10-CM

## 2024-06-12 DIAGNOSIS — Z79.899 HIGH RISK MEDICATION USE: Primary | ICD-10-CM

## 2024-06-12 RX ORDER — PREGABALIN 300 MG/1
300 CAPSULE ORAL 2 TIMES DAILY
Qty: 60 CAPSULE | Refills: 3 | Status: SHIPPED | OUTPATIENT
Start: 2024-06-12

## 2024-06-12 RX ORDER — HYDROCODONE BITARTRATE AND ACETAMINOPHEN 7.5; 325 MG/1; MG/1
1 TABLET ORAL EVERY 8 HOURS PRN
Qty: 90 TABLET | Refills: 0 | Status: SHIPPED | OUTPATIENT
Start: 2024-07-11

## 2024-06-12 RX ORDER — HYDROCODONE BITARTRATE AND ACETAMINOPHEN 7.5; 325 MG/1; MG/1
1 TABLET ORAL EVERY 8 HOURS PRN
Qty: 90 TABLET | Refills: 0 | Status: SHIPPED | OUTPATIENT
Start: 2024-08-09

## 2024-06-12 RX ORDER — HYDROCODONE BITARTRATE AND ACETAMINOPHEN 7.5; 325 MG/1; MG/1
1 TABLET ORAL EVERY 8 HOURS PRN
Qty: 90 TABLET | Refills: 0 | Status: SHIPPED | OUTPATIENT
Start: 2024-06-12

## 2024-06-12 NOTE — PROGRESS NOTES
Patient screened positive for depression based on a PHQ-9 score of 0 on 6/12/2024. Follow-up recommendations include: Elevated PHQ score reflective of acute illness, not depression     CHIEF COMPLAINT  Chief Complaint   Patient presents with    Back Pain     Hydrocodone  LD 10:00am today    Knee Pain       Primary Care  Eufemia Malcolm PA    Subjective   Aj Plaza is a 49 y.o. male  who presents for generalized pain.  He states that he has had pain in the neck, mid back, low back, knees for many years.  It appears that he was previously being treated at an outside pain clinic approximately 10 years ago with a combination of narcotics however he is no longer at the clinic for unknown reasons.  He states that most recently, he was smoking marijuana for pain control however he has subsequently developed lung issues and can no longer smoke marijuana.  He presents today to establish care.  He describes pain essentially all over his body.  He cannot identify any specific areas of pain which are of more concern than the others.    Back Pain  Associated symptoms include numbness. Pertinent negatives include no weakness.   Pain  Associated symptoms include arthralgias, myalgias, neck pain and numbness. Pertinent negatives include no weakness.   Neck Pain   Associated symptoms include numbness. Pertinent negatives include no weakness.   Knee Pain   Associated symptoms include numbness.        Location: Generalized pain  Onset: Many years ago  Duration: Progressively worsening  Timing: Constant throughout the day  Quality: Numbness and tingling in the hands with sharp, stabbing pains in the legs and feet  Severity: Today: 5       Last Week: 6       Worst: 8  Modifying Factors: The pain is fairly constant without any exacerbating or relieving factors    Physical Therapy: no    Interval Update 06/12/2024: Requesting stronger narcotics.  Otherwise, no changes.  Continues with Lyrica.    The following portions of the  patient's history were reviewed and updated as appropriate: allergies, current medications, past family history, past medical history, past social history, past surgical history and problem list.      Current Outpatient Medications:     Accu-Chek Softclix Lancets lancets, 1 each by Other route 2 (Two) Times a Day. Use to monitor glucose twice daily. E11.9, Disp: 100 each, Rfl: 2    albuterol sulfate  (90 Base) MCG/ACT inhaler, Inhale 2 puffs Every 4 (Four) Hours As Needed for Wheezing., Disp: 6.7 g, Rfl: 2    amLODIPine (NORVASC) 10 MG tablet, Take 1 tablet by mouth Daily., Disp: 30 tablet, Rfl: 0    atorvastatin (LIPITOR) 40 MG tablet, TAKE 1 TABLET BY MOUTH EVERY NIGHT, Disp: 30 tablet, Rfl: 2    B-D UF III MINI PEN NEEDLES 31G X 5 MM misc, Inject 1 each under the skin into the appropriate area as directed Daily. E11.9, Disp: 100 each, Rfl: 2    Budeson-Glycopyrrol-Formoterol (Breztri Aerosphere) 160-9-4.8 MCG/ACT aerosol inhaler, Inhale 2 puffs 2 (Two) Times a Day. Rinse mouth after each use, Disp: 10.7 g, Rfl: 2    EPINEPHrine (EPIPEN) 0.3 MG/0.3ML solution auto-injector injection, , Disp: , Rfl:     famotidine (Pepcid) 20 MG tablet, Take 1 tablet by mouth At Night As Needed for Heartburn., Disp: 20 tablet, Rfl: 0    glucose blood test strip, Use to monitor glucose twice daily. Dx: E11.69, Disp: 100 each, Rfl: 3    glucose monitor monitoring kit, Use to monitor glucose twice a day. Dx: E11.69, Disp: 1 each, Rfl: 0    insulin detemir (Levemir FlexTouch) 100 UNIT/ML injection, Inject 20 Units under the skin into the appropriate area as directed Daily., Disp: 18 mL, Rfl: 2    losartan (COZAAR) 100 MG tablet, TAKE 1 TABLET BY MOUTH DAILY, Disp: 30 tablet, Rfl: 2    ondansetron ODT (ZOFRAN-ODT) 4 MG disintegrating tablet, Place 1 tablet on the tongue Every 8 (Eight) Hours As Needed for Vomiting or Nausea., Disp: 10 tablet, Rfl: 0    pantoprazole (PROTONIX) 40 MG EC tablet, Take 1 tablet by mouth 2 (Two) Times  a Day Before Meals., Disp: 60 tablet, Rfl: 1    pregabalin (LYRICA) 300 MG capsule, Take 1 capsule by mouth 2 (Two) Times a Day., Disp: 60 capsule, Rfl: 3    promethazine (PHENERGAN) 12.5 MG tablet, Take 1 tablet by mouth Every 6 (Six) Hours As Needed for Nausea or Vomiting., Disp: 10 tablet, Rfl: 0    roflumilast (DALIRESP) 500 MCG tablet tablet, TAKE 1 TABLET BY MOUTH DAILY, Disp: 30 tablet, Rfl: 2    SITagliptin-metFORMIN HCl ER (Janumet XR)  MG tablet, TAKE 2 TABLETS BY MOUTH DAILY, Disp: 180 tablet, Rfl: 0    [START ON 8/9/2024] HYDROcodone-acetaminophen (NORCO) 7.5-325 MG per tablet, Take 1 tablet by mouth Every 8 (Eight) Hours As Needed for Moderate Pain., Disp: 90 tablet, Rfl: 0    [START ON 7/11/2024] HYDROcodone-acetaminophen (NORCO) 7.5-325 MG per tablet, Take 1 tablet by mouth Every 8 (Eight) Hours As Needed for Moderate Pain., Disp: 90 tablet, Rfl: 0    HYDROcodone-acetaminophen (NORCO) 7.5-325 MG per tablet, Take 1 tablet by mouth Every 8 (Eight) Hours As Needed for Moderate Pain., Disp: 90 tablet, Rfl: 0    metoprolol tartrate (LOPRESSOR) 25 MG tablet, Take 1 tablet by mouth 2 (Two) Times a Day., Disp: , Rfl:     Review of Systems   Musculoskeletal:  Positive for arthralgias, back pain, myalgias and neck pain. Negative for gait problem and neck stiffness.   Neurological:  Positive for numbness. Negative for weakness.       Vitals:    06/12/24 1537   BP: 147/94   Pulse: 89   Resp: 16   SpO2: 96%   Weight: 101 kg (222 lb)   PainSc:   5       Urine Drug Screen: 3/13/2024  Appropriate: Positive for THC    Objective   Physical Exam  Vitals and nursing note reviewed. Exam conducted with a chaperone present.   Constitutional:       General: He is not in acute distress.     Appearance: Normal appearance. He is normal weight.   Neurological:      General: No focal deficit present.      Mental Status: He is alert. Mental status is at baseline.      Sensory: No sensory deficit.      Motor: No weakness.            Assessment & Plan   Problems Addressed this Visit    None  Visit Diagnoses       High risk medication use    -  Primary    Diabetic polyneuropathy associated with type 2 diabetes mellitus        Relevant Medications    pregabalin (LYRICA) 300 MG capsule    HYDROcodone-acetaminophen (NORCO) 7.5-325 MG per tablet (Start on 8/9/2024)    HYDROcodone-acetaminophen (NORCO) 7.5-325 MG per tablet (Start on 7/11/2024)    HYDROcodone-acetaminophen (NORCO) 7.5-325 MG per tablet          Diagnoses         Codes Comments    High risk medication use    -  Primary ICD-10-CM: Z79.899  ICD-9-CM: V58.69     Diabetic polyneuropathy associated with type 2 diabetes mellitus     ICD-10-CM: E11.42  ICD-9-CM: 250.60, 357.2             Plan:  Increase to Norco 7.5mg TID  Continue Lyrica 300 mg twice daily  UDS, Inspect appropriate  No significant degenerative change seen in the films  --- Follow-up 3 months           INSPECT REPORT    As part of the patient's treatment plan, I may be prescribing controlled substances. The patient has been made aware of appropriate use of such medications, including potential risk of somnolence, limited ability to drive and/or work safely, and the potential for dependence or overdose. It has also bee made clear that these medications are for use by this patient only, without concomitant use of alcohol or other substances unless prescribed.     Patient has completed prescribing agreement detailing terms of continued prescribing of controlled substances, including monitoring [facility] reports, urine drug screening, and pill counts if necessary. The patient is aware that inappropriate use will results in cessation of prescribing such medications.    INSPECT report has been reviewed and scanned into the patient's chart.    As the clinician, I personally reviewed the INSPECT from 6/11/24.    History and physical exam exhibit continued safe and appropriate use of controlled substances.      EMR  Dragon/Transcription disclaimer:   Much of this encounter note is an electronic transcription/translation of spoken language to printed text. The electronic translation of spoken language may permit erroneous, or at times, nonsensical words or phrases to be inadvertently transcribed; Although I have reviewed the note for such errors, some may still exist.       .

## 2024-07-22 ENCOUNTER — TELEPHONE (OUTPATIENT)
Dept: PAIN MEDICINE | Facility: CLINIC | Age: 49
End: 2024-07-22
Payer: MEDICARE

## 2024-07-22 NOTE — TELEPHONE ENCOUNTER
Patient is questioning why you prescribe him Hydrocodone when you knew there is a shortage on opioids. He just find out today his pharmacy will not have this on stock. Patient states he doesn't not want to go trough withdraws and we should have a pill safe here so that patient can have it if pharmacy is out. He also states he is reporting us to [facility].   I did told patient to take tylenol in between his Hydrocodone but that upset him even more. I also advise him to check with [facility] pharmacy since they have been good about keeping that on stock.

## 2024-07-22 NOTE — TELEPHONE ENCOUNTER
Provider: DR. COYNE    Caller: VIRI HARO    Relationship to Patient: SELF    Phone Number: [phone]    Reason for Call:PATIENT CALLED WANTING TO SPEAK WITH AN MA REGARDING HIS MEDICATION REQUEST FOR HYDROCODONE. STATES HE HAS HAS SOME TROUBLE WITH HIS PHARMACY NOT HAVING THE MEDICATION AND DOESN'T WANT TO EXPERIENCE WITHDRAWS. PLEASE RETURN CALL.

## 2024-07-30 ENCOUNTER — TELEPHONE (OUTPATIENT)
Dept: PAIN MEDICINE | Facility: CLINIC | Age: 49
End: 2024-07-30
Payer: MEDICARE

## 2024-07-30 NOTE — TELEPHONE ENCOUNTER
Spoke to pt regarding his ability to obtain his pain medication prescription.  Pt stated he did receive it and was feeling better. He did not understand that the lack of available pain medication was a national issue; wished he'd been told that earlier.   He understands that he needs to be patient about receiving medication from now on. Patient was pleasant and very happy with the care he has received from Dr. Mathis.

## 2024-09-11 ENCOUNTER — OFFICE VISIT (OUTPATIENT)
Dept: PAIN MEDICINE | Facility: CLINIC | Age: 49
End: 2024-09-11
Payer: MEDICARE

## 2024-09-11 VITALS
WEIGHT: 225 LBS | RESPIRATION RATE: 16 BRPM | HEART RATE: 84 BPM | DIASTOLIC BLOOD PRESSURE: 98 MMHG | OXYGEN SATURATION: 97 % | BODY MASS INDEX: 30.52 KG/M2 | SYSTOLIC BLOOD PRESSURE: 144 MMHG

## 2024-09-11 DIAGNOSIS — M25.552 BILATERAL HIP PAIN: ICD-10-CM

## 2024-09-11 DIAGNOSIS — E11.42 DIABETIC POLYNEUROPATHY ASSOCIATED WITH TYPE 2 DIABETES MELLITUS: ICD-10-CM

## 2024-09-11 DIAGNOSIS — M25.551 BILATERAL HIP PAIN: ICD-10-CM

## 2024-09-11 DIAGNOSIS — M51.26 DISPLACEMENT OF LUMBAR INTERVERTEBRAL DISC WITHOUT MYELOPATHY: Primary | ICD-10-CM

## 2024-09-11 PROCEDURE — 3077F SYST BP >= 140 MM HG: CPT | Performed by: STUDENT IN AN ORGANIZED HEALTH CARE EDUCATION/TRAINING PROGRAM

## 2024-09-11 PROCEDURE — 1160F RVW MEDS BY RX/DR IN RCRD: CPT | Performed by: STUDENT IN AN ORGANIZED HEALTH CARE EDUCATION/TRAINING PROGRAM

## 2024-09-11 PROCEDURE — 1159F MED LIST DOCD IN RCRD: CPT | Performed by: STUDENT IN AN ORGANIZED HEALTH CARE EDUCATION/TRAINING PROGRAM

## 2024-09-11 PROCEDURE — 99214 OFFICE O/P EST MOD 30 MIN: CPT | Performed by: STUDENT IN AN ORGANIZED HEALTH CARE EDUCATION/TRAINING PROGRAM

## 2024-09-11 PROCEDURE — 3080F DIAST BP >= 90 MM HG: CPT | Performed by: STUDENT IN AN ORGANIZED HEALTH CARE EDUCATION/TRAINING PROGRAM

## 2024-09-11 PROCEDURE — 1125F AMNT PAIN NOTED PAIN PRSNT: CPT | Performed by: STUDENT IN AN ORGANIZED HEALTH CARE EDUCATION/TRAINING PROGRAM

## 2024-09-11 RX ORDER — HYDROCODONE BITARTRATE AND ACETAMINOPHEN 7.5; 325 MG/1; MG/1
1 TABLET ORAL EVERY 8 HOURS PRN
Qty: 90 TABLET | Refills: 0 | Status: SHIPPED | OUTPATIENT
Start: 2024-11-15

## 2024-09-11 RX ORDER — HYDROCODONE BITARTRATE AND ACETAMINOPHEN 7.5; 325 MG/1; MG/1
1 TABLET ORAL EVERY 8 HOURS PRN
Qty: 90 TABLET | Refills: 0 | Status: SHIPPED | OUTPATIENT
Start: 2024-09-19

## 2024-09-11 RX ORDER — PREGABALIN 300 MG/1
300 CAPSULE ORAL 2 TIMES DAILY
Qty: 60 CAPSULE | Refills: 3 | Status: SHIPPED | OUTPATIENT
Start: 2024-09-11

## 2024-09-11 RX ORDER — HYDROCODONE BITARTRATE AND ACETAMINOPHEN 7.5; 325 MG/1; MG/1
1 TABLET ORAL EVERY 8 HOURS PRN
Qty: 90 TABLET | Refills: 0 | Status: SHIPPED | OUTPATIENT
Start: 2024-10-18

## 2024-09-11 NOTE — PROGRESS NOTES
Patient screened positive for depression based on a PHQ-9 score of 0 on 9/11/2024. Follow-up recommendations include: Elevated PHQ score reflective of acute illness, not depression     CHIEF COMPLAINT  Chief Complaint   Patient presents with    Extremity Pain     R hip and leg   1p today       Primary Care  Eufemia Malcolm PA    Subjective   Aj Plaza is a 49 y.o. male  who presents for generalized pain.  He states that he has had pain in the neck, mid back, low back, knees for many years.  It appears that he was previously being treated at an outside pain clinic approximately 10 years ago with a combination of narcotics however he is no longer at the clinic for unknown reasons.  He states that most recently, he was smoking marijuana for pain control however he has subsequently developed lung issues and can no longer smoke marijuana.  He presents today to establish care.  He describes pain essentially all over his body.  He cannot identify any specific areas of pain which are of more concern than the others.    Back Pain  Associated symptoms include numbness. Pertinent negatives include no weakness.   Pain  Associated symptoms include arthralgias, myalgias, neck pain and numbness. Pertinent negatives include no weakness.   Neck Pain   Associated symptoms include numbness. Pertinent negatives include no weakness.   Knee Pain   Associated symptoms include numbness.   Extremity Pain   Associated symptoms include numbness.        Location: Generalized pain  Onset: Many years ago  Duration: Progressively worsening  Timing: Constant throughout the day  Quality: Numbness and tingling in the hands with sharp, stabbing pains in the legs and feet  Severity: Today: 5       Last Week: 6       Worst: 8  Modifying Factors: The pain is fairly constant without any exacerbating or relieving factors    Physical Therapy: no    Interval Update 09/11/2024: Continues with Norco 7.5 mg TID.  Otherwise, unchanged.    The following  portions of the patient's history were reviewed and updated as appropriate: allergies, current medications, past family history, past medical history, past social history, past surgical history and problem list.      Current Outpatient Medications:     Accu-Chek Softclix Lancets lancets, 1 each by Other route 2 (Two) Times a Day. Use to monitor glucose twice daily. E11.9, Disp: 100 each, Rfl: 2    albuterol sulfate  (90 Base) MCG/ACT inhaler, Inhale 2 puffs Every 4 (Four) Hours As Needed for Wheezing., Disp: 6.7 g, Rfl: 2    amLODIPine (NORVASC) 10 MG tablet, Take 1 tablet by mouth Daily., Disp: 30 tablet, Rfl: 0    atorvastatin (LIPITOR) 40 MG tablet, TAKE 1 TABLET BY MOUTH EVERY NIGHT, Disp: 30 tablet, Rfl: 2    B-D UF III MINI PEN NEEDLES 31G X 5 MM misc, Inject 1 each under the skin into the appropriate area as directed Daily. E11.9, Disp: 100 each, Rfl: 2    Budeson-Glycopyrrol-Formoterol (Breztri Aerosphere) 160-9-4.8 MCG/ACT aerosol inhaler, Inhale 2 puffs 2 (Two) Times a Day. Rinse mouth after each use, Disp: 10.7 g, Rfl: 2    EPINEPHrine (EPIPEN) 0.3 MG/0.3ML solution auto-injector injection, , Disp: , Rfl:     famotidine (Pepcid) 20 MG tablet, Take 1 tablet by mouth At Night As Needed for Heartburn., Disp: 20 tablet, Rfl: 0    glucose blood test strip, Use to monitor glucose twice daily. Dx: E11.69, Disp: 100 each, Rfl: 3    glucose monitor monitoring kit, Use to monitor glucose twice a day. Dx: E11.69, Disp: 1 each, Rfl: 0    [START ON 9/19/2024] HYDROcodone-acetaminophen (NORCO) 7.5-325 MG per tablet, Take 1 tablet by mouth Every 8 (Eight) Hours As Needed for Moderate Pain., Disp: 90 tablet, Rfl: 0    [START ON 10/18/2024] HYDROcodone-acetaminophen (NORCO) 7.5-325 MG per tablet, Take 1 tablet by mouth Every 8 (Eight) Hours As Needed for Moderate Pain., Disp: 90 tablet, Rfl: 0    [START ON 11/15/2024] HYDROcodone-acetaminophen (NORCO) 7.5-325 MG per tablet, Take 1 tablet by mouth Every 8 (Eight)  Hours As Needed for Moderate Pain., Disp: 90 tablet, Rfl: 0    insulin detemir (Levemir FlexTouch) 100 UNIT/ML injection, Inject 20 Units under the skin into the appropriate area as directed Daily., Disp: 18 mL, Rfl: 2    losartan (COZAAR) 100 MG tablet, TAKE 1 TABLET BY MOUTH DAILY, Disp: 30 tablet, Rfl: 2    metoprolol tartrate (LOPRESSOR) 25 MG tablet, Take 1 tablet by mouth 2 (Two) Times a Day., Disp: , Rfl:     ondansetron ODT (ZOFRAN-ODT) 4 MG disintegrating tablet, Place 1 tablet on the tongue Every 8 (Eight) Hours As Needed for Vomiting or Nausea., Disp: 10 tablet, Rfl: 0    pantoprazole (PROTONIX) 40 MG EC tablet, Take 1 tablet by mouth 2 (Two) Times a Day Before Meals., Disp: 60 tablet, Rfl: 1    pregabalin (LYRICA) 300 MG capsule, Take 1 capsule by mouth 2 (Two) Times a Day., Disp: 60 capsule, Rfl: 3    promethazine (PHENERGAN) 12.5 MG tablet, Take 1 tablet by mouth Every 6 (Six) Hours As Needed for Nausea or Vomiting., Disp: 10 tablet, Rfl: 0    roflumilast (DALIRESP) 500 MCG tablet tablet, TAKE 1 TABLET BY MOUTH DAILY, Disp: 30 tablet, Rfl: 2    SITagliptin-metFORMIN HCl ER (Janumet XR)  MG tablet, TAKE 2 TABLETS BY MOUTH DAILY, Disp: 180 tablet, Rfl: 0    Review of Systems   Musculoskeletal:  Positive for arthralgias, back pain, myalgias and neck pain. Negative for gait problem and neck stiffness.   Neurological:  Positive for numbness. Negative for weakness.       Vitals:    09/11/24 1608 09/11/24 1609   BP: (!) 170/121 144/98   Pulse: 84    Resp: 16    SpO2: 97%    Weight: 102 kg (225 lb)    PainSc:   5        Urine Drug Screen: 3/13/2024  Appropriate: Positive for THC    Objective   Physical Exam  Vitals and nursing note reviewed. Exam conducted with a chaperone present.   Constitutional:       General: He is not in acute distress.     Appearance: Normal appearance. He is normal weight.   Neurological:      General: No focal deficit present.      Mental Status: He is alert. Mental status is  at baseline.      Sensory: No sensory deficit.      Motor: No weakness.           Assessment & Plan   Problems Addressed this Visit    None  Visit Diagnoses       Displacement of lumbar intervertebral disc without myelopathy    -  Primary    Diabetic polyneuropathy associated with type 2 diabetes mellitus        Relevant Medications    HYDROcodone-acetaminophen (NORCO) 7.5-325 MG per tablet (Start on 9/19/2024)    HYDROcodone-acetaminophen (NORCO) 7.5-325 MG per tablet (Start on 10/18/2024)    HYDROcodone-acetaminophen (NORCO) 7.5-325 MG per tablet (Start on 11/15/2024)    pregabalin (LYRICA) 300 MG capsule    Bilateral hip pain              Diagnoses         Codes Comments    Displacement of lumbar intervertebral disc without myelopathy    -  Primary ICD-10-CM: M51.26  ICD-9-CM: 722.10     Diabetic polyneuropathy associated with type 2 diabetes mellitus     ICD-10-CM: E11.42  ICD-9-CM: 250.60, 357.2     Bilateral hip pain     ICD-10-CM: M25.551, M25.552  ICD-9-CM: 719.45             Plan:  Continue Norco 7.5 mg TID  Continue Lyrica 300 mg twice daily  UDS, Inspect appropriate  No significant degenerative change seen in the films  --- Follow-up 3 months           INSPECT REPORT    As part of the patient's treatment plan, I may be prescribing controlled substances. The patient has been made aware of appropriate use of such medications, including potential risk of somnolence, limited ability to drive and/or work safely, and the potential for dependence or overdose. It has also bee made clear that these medications are for use by this patient only, without concomitant use of alcohol or other substances unless prescribed.     Patient has completed prescribing agreement detailing terms of continued prescribing of controlled substances, including monitoring [facility] reports, urine drug screening, and pill counts if necessary. The patient is aware that inappropriate use will results in cessation of prescribing such  medications.    INSPECT report has been reviewed and scanned into the patient's chart.    As the clinician, I personally reviewed the INSPECT from 9/10/24.    History and physical exam exhibit continued safe and appropriate use of controlled substances.      EMR Dragon/Transcription disclaimer:   Much of this encounter note is an electronic transcription/translation of spoken language to printed text. The electronic translation of spoken language may permit erroneous, or at times, nonsensical words or phrases to be inadvertently transcribed; Although I have reviewed the note for such errors, some may still exist.       .

## 2024-12-11 ENCOUNTER — OFFICE VISIT (OUTPATIENT)
Dept: PAIN MEDICINE | Facility: CLINIC | Age: 49
End: 2024-12-11
Payer: MEDICARE

## 2024-12-11 VITALS
SYSTOLIC BLOOD PRESSURE: 140 MMHG | DIASTOLIC BLOOD PRESSURE: 96 MMHG | BODY MASS INDEX: 30.65 KG/M2 | WEIGHT: 226 LBS | HEART RATE: 87 BPM | RESPIRATION RATE: 16 BRPM | OXYGEN SATURATION: 97 %

## 2024-12-11 DIAGNOSIS — Z79.899 HIGH RISK MEDICATION USE: Primary | ICD-10-CM

## 2024-12-11 DIAGNOSIS — M51.26 DISPLACEMENT OF LUMBAR INTERVERTEBRAL DISC WITHOUT MYELOPATHY: ICD-10-CM

## 2024-12-11 DIAGNOSIS — E11.42 DIABETIC POLYNEUROPATHY ASSOCIATED WITH TYPE 2 DIABETES MELLITUS: ICD-10-CM

## 2024-12-11 RX ORDER — HYDROCODONE BITARTRATE AND ACETAMINOPHEN 7.5; 325 MG/1; MG/1
1 TABLET ORAL EVERY 8 HOURS PRN
Qty: 90 TABLET | Refills: 0 | Status: SHIPPED | OUTPATIENT
Start: 2025-01-16

## 2024-12-11 RX ORDER — PREGABALIN 300 MG/1
300 CAPSULE ORAL 2 TIMES DAILY
Qty: 60 CAPSULE | Refills: 3 | Status: SHIPPED | OUTPATIENT
Start: 2024-12-11

## 2024-12-11 RX ORDER — HYDROCODONE BITARTRATE AND ACETAMINOPHEN 7.5; 325 MG/1; MG/1
1 TABLET ORAL EVERY 8 HOURS PRN
Qty: 90 TABLET | Refills: 0 | Status: SHIPPED | OUTPATIENT
Start: 2024-12-17

## 2024-12-11 RX ORDER — HYDROCODONE BITARTRATE AND ACETAMINOPHEN 7.5; 325 MG/1; MG/1
1 TABLET ORAL EVERY 8 HOURS PRN
Qty: 90 TABLET | Refills: 0 | Status: SHIPPED | OUTPATIENT
Start: 2025-02-14

## 2024-12-11 NOTE — PROGRESS NOTES
Patient screened positive for depression based on a PHQ-9 score of  on . Follow-up recommendations include: Elevated PHQ score reflective of acute illness, not depression     CHIEF COMPLAINT  Chief Complaint   Patient presents with    Back Pain     Hydrocodone LD 2:30p today       Primary Care  Lucas Dailey MD    Subjective   Aj Plaza is a 49 y.o. male  who presents for generalized pain.  He states that he has had pain in the neck, mid back, low back, knees for many years.  It appears that he was previously being treated at an outside pain clinic approximately 10 years ago with a combination of narcotics however he is no longer at the clinic for unknown reasons.  He states that most recently, he was smoking marijuana for pain control however he has subsequently developed lung issues and can no longer smoke marijuana.  He presents today to establish care.  He describes pain essentially all over his body.  He cannot identify any specific areas of pain which are of more concern than the others.    Back Pain  Associated symptoms include numbness. Pertinent negatives include no weakness.   Pain  Associated symptoms include arthralgias, myalgias, neck pain and numbness. Pertinent negatives include no weakness.   Neck Pain   Associated symptoms include numbness. Pertinent negatives include no weakness.   Knee Pain   Associated symptoms include numbness.   Extremity Pain   Associated symptoms include numbness.        Location: Generalized pain  Onset: Many years ago  Duration: Progressively worsening  Timing: Constant throughout the day  Quality: Numbness and tingling in the hands with sharp, stabbing pains in the legs and feet  Severity: Today: 5       Last Week: 6       Worst: 8  Modifying Factors: The pain is fairly constant without any exacerbating or relieving factors    Physical Therapy: no    Interval Update 12/11/2024: Continues with Norco 7.5 mg TID.  Otherwise, unchanged.    The following portions of  the patient's history were reviewed and updated as appropriate: allergies, current medications, past family history, past medical history, past social history, past surgical history and problem list.      Current Outpatient Medications:     Accu-Chek Softclix Lancets lancets, 1 each by Other route 2 (Two) Times a Day. Use to monitor glucose twice daily. E11.9, Disp: 100 each, Rfl: 2    albuterol sulfate  (90 Base) MCG/ACT inhaler, Inhale 2 puffs Every 4 (Four) Hours As Needed for Wheezing., Disp: 6.7 g, Rfl: 2    amLODIPine (NORVASC) 10 MG tablet, Take 1 tablet by mouth Daily., Disp: 30 tablet, Rfl: 0    atorvastatin (LIPITOR) 40 MG tablet, TAKE 1 TABLET BY MOUTH EVERY NIGHT, Disp: 30 tablet, Rfl: 2    B-D UF III MINI PEN NEEDLES 31G X 5 MM misc, Inject 1 each under the skin into the appropriate area as directed Daily. E11.9, Disp: 100 each, Rfl: 2    Budeson-Glycopyrrol-Formoterol (Breztri Aerosphere) 160-9-4.8 MCG/ACT aerosol inhaler, Inhale 2 puffs 2 (Two) Times a Day. Rinse mouth after each use, Disp: 10.7 g, Rfl: 2    EPINEPHrine (EPIPEN) 0.3 MG/0.3ML solution auto-injector injection, , Disp: , Rfl:     famotidine (Pepcid) 20 MG tablet, Take 1 tablet by mouth At Night As Needed for Heartburn., Disp: 20 tablet, Rfl: 0    glucose blood test strip, Use to monitor glucose twice daily. Dx: E11.69, Disp: 100 each, Rfl: 3    glucose monitor monitoring kit, Use to monitor glucose twice a day. Dx: E11.69, Disp: 1 each, Rfl: 0    [START ON 12/17/2024] HYDROcodone-acetaminophen (NORCO) 7.5-325 MG per tablet, Take 1 tablet by mouth Every 8 (Eight) Hours As Needed for Moderate Pain., Disp: 90 tablet, Rfl: 0    [START ON 1/16/2025] HYDROcodone-acetaminophen (NORCO) 7.5-325 MG per tablet, Take 1 tablet by mouth Every 8 (Eight) Hours As Needed for Moderate Pain., Disp: 90 tablet, Rfl: 0    [START ON 2/14/2025] HYDROcodone-acetaminophen (NORCO) 7.5-325 MG per tablet, Take 1 tablet by mouth Every 8 (Eight) Hours As Needed  for Moderate Pain., Disp: 90 tablet, Rfl: 0    insulin detemir (Levemir FlexTouch) 100 UNIT/ML injection, Inject 20 Units under the skin into the appropriate area as directed Daily., Disp: 18 mL, Rfl: 2    losartan (COZAAR) 100 MG tablet, TAKE 1 TABLET BY MOUTH DAILY, Disp: 30 tablet, Rfl: 2    metoprolol tartrate (LOPRESSOR) 25 MG tablet, Take 1 tablet by mouth 2 (Two) Times a Day., Disp: , Rfl:     ondansetron ODT (ZOFRAN-ODT) 4 MG disintegrating tablet, Place 1 tablet on the tongue Every 8 (Eight) Hours As Needed for Vomiting or Nausea., Disp: 10 tablet, Rfl: 0    pantoprazole (PROTONIX) 40 MG EC tablet, Take 1 tablet by mouth 2 (Two) Times a Day Before Meals., Disp: 60 tablet, Rfl: 1    pregabalin (LYRICA) 300 MG capsule, Take 1 capsule by mouth 2 (Two) Times a Day., Disp: 60 capsule, Rfl: 3    promethazine (PHENERGAN) 12.5 MG tablet, Take 1 tablet by mouth Every 6 (Six) Hours As Needed for Nausea or Vomiting., Disp: 10 tablet, Rfl: 0    roflumilast (DALIRESP) 500 MCG tablet tablet, TAKE 1 TABLET BY MOUTH DAILY, Disp: 30 tablet, Rfl: 2    SITagliptin-metFORMIN HCl ER (Janumet XR)  MG tablet, TAKE 2 TABLETS BY MOUTH DAILY, Disp: 180 tablet, Rfl: 0    naloxone (NARCAN) 4 MG/0.1ML nasal spray, Call 911. Don't prime. [place] in 1 nostril for overdose. Repeat in 2-3 minutes in other nostril if no or minimal breathing/responsiveness., Disp: 2 each, Rfl: 0    Review of Systems   Musculoskeletal:  Positive for arthralgias, back pain, myalgias and neck pain. Negative for gait problem and neck stiffness.   Neurological:  Positive for numbness. Negative for weakness.       Vitals:    12/11/24 1551 12/11/24 1552   BP: (!) 157/102 140/96   Pulse: 87    Resp: 16    SpO2: 97%    Weight: 103 kg (226 lb)    PainSc:   4        Urine Drug Screen: 3/13/2024  Appropriate: Positive for THC    Objective   Physical Exam  Vitals and nursing note reviewed. Exam conducted with a chaperone present.   Constitutional:       General: He  is not in acute distress.     Appearance: Normal appearance. He is normal weight.   Neurological:      General: No focal deficit present.      Mental Status: He is alert. Mental status is at baseline.      Sensory: No sensory deficit.      Motor: No weakness.           Assessment & Plan   Problems Addressed this Visit    None  Visit Diagnoses       High risk medication use    -  Primary    Diabetic polyneuropathy associated with type 2 diabetes mellitus        Relevant Medications    HYDROcodone-acetaminophen (NORCO) 7.5-325 MG per tablet (Start on 12/17/2024)    HYDROcodone-acetaminophen (NORCO) 7.5-325 MG per tablet (Start on 1/16/2025)    HYDROcodone-acetaminophen (NORCO) 7.5-325 MG per tablet (Start on 2/14/2025)    pregabalin (LYRICA) 300 MG capsule    Displacement of lumbar intervertebral disc without myelopathy              Diagnoses         Codes Comments    High risk medication use    -  Primary ICD-10-CM: Z79.899  ICD-9-CM: V58.69     Diabetic polyneuropathy associated with type 2 diabetes mellitus     ICD-10-CM: E11.42  ICD-9-CM: 250.60, 357.2     Displacement of lumbar intervertebral disc without myelopathy     ICD-10-CM: M51.26  ICD-9-CM: 722.10             Plan:  Continue Norco 7.5 mg TID  Continue Lyrica 300 mg twice daily  UDS, Inspect appropriate  No significant degenerative change seen in the films  --- Follow-up 3 months           INSPECT REPORT    As part of the patient's treatment plan, I may be prescribing controlled substances. The patient has been made aware of appropriate use of such medications, including potential risk of somnolence, limited ability to drive and/or work safely, and the potential for dependence or overdose. It has also bee made clear that these medications are for use by this patient only, without concomitant use of alcohol or other substances unless prescribed.     Patient has completed prescribing agreement detailing terms of continued prescribing of controlled  substances, including monitoring [facility] reports, urine drug screening, and pill counts if necessary. The patient is aware that inappropriate use will results in cessation of prescribing such medications.    INSPECT report has been reviewed and scanned into the patient's chart.    As the clinician, I personally reviewed the INSPECT from 12/10/2024.    History and physical exam exhibit continued safe and appropriate use of controlled substances.      EMR Dragon/Transcription disclaimer:   Much of this encounter note is an electronic transcription/translation of spoken language to printed text. The electronic translation of spoken language may permit erroneous, or at times, nonsensical words or phrases to be inadvertently transcribed; Although I have reviewed the note for such errors, some may still exist.       .

## 2024-12-13 ENCOUNTER — TELEPHONE (OUTPATIENT)
Dept: PAIN MEDICINE | Facility: CLINIC | Age: 49
End: 2024-12-13
Payer: MEDICARE

## 2024-12-13 NOTE — TELEPHONE ENCOUNTER
Patient advised it was prescribed due to insurance requirements and him being prescribed an opioid.

## 2024-12-13 NOTE — TELEPHONE ENCOUNTER
Caller: Aj Plaza    Relationship: Self    Best call back number:     Who are you requesting to speak with (clinical staff, provider,  specific staff member): CLINICAL    What was the call regarding: MR PLAZA STATED WHEN HE WENT TO HIS PHARMACY TO  HIS INSULIN THEY TOLD HIM HE HAD AN RX FOR NARCAN. HE WASN'T AWARE THAT WAS BEING PRESCRIBED AND WOULD LIKE TO KNOW WHY THAT WAS PRESCRIBED TO HIM.     Is it okay if the provider responds through MyChart: CALL

## 2025-02-06 ENCOUNTER — TELEPHONE (OUTPATIENT)
Dept: PAIN MEDICINE | Facility: CLINIC | Age: 50
End: 2025-02-06
Payer: MEDICARE

## 2025-02-06 NOTE — TELEPHONE ENCOUNTER
Caller: Aj Plaza    Relationship to patient: Self    Best call back number:     Chief complaint: BACK PAIN     Type of visit: NEW PROBLEM/FUP     Additional notes:FORMER PATIENT OF DR COYNE LOOKING TO SCHEDULE FUP

## 2025-02-17 ENCOUNTER — TELEPHONE (OUTPATIENT)
Dept: PAIN MEDICINE | Facility: CLINIC | Age: 50
End: 2025-02-17
Payer: MEDICARE

## 2025-02-17 NOTE — TELEPHONE ENCOUNTER
Caller: Aj Plaza    Relationship: Self    Best call back number: [phone]     SPOKE WITH PT- HE STATES THAT HE WILL BE OUT OF HIS HYDOCODONE ON 3.15.25 AND HIS APPOINTMENT WITH DR MENESES IS ON 4.3.25  PT STATES THAT HE DOES HAVE WITHDRAWAL SYMPTOMS AND WAS TOLD BY THE OFFICE THAT A NON NARCOTIC MEDICATION COULD BE CALLED IN TO HELP WITH THE WITHDRAWALS.    PLEASE CONTACT PT AND ADVISE      [id number] - [place], [place] - [address] AT [address] - [phone]  - [phone] FX  [address] [place] [address]  Phone: [phone]  Fax: [phone]

## 2025-02-18 NOTE — TELEPHONE ENCOUNTER
Pt is requesting a withdrawal medication to be sent in to get him through the withdrawal symptoms. He is previous Dr. Mathis pt that was unable to get an appt with you before his pain medication runs out.

## 2025-02-18 NOTE — TELEPHONE ENCOUNTER
We do not write for withdrawal medications without symptoms. Recommend decreasing pain medication to twice daily from three times daily for 7 days and then once daily till he sees me to last him till he sees me in office. If he slowly decreases dosing, he should not have any withdrawal symptoms.     Josh Hughes DO  Pain Management   [facility]

## 2025-02-27 ENCOUNTER — TELEPHONE (OUTPATIENT)
Dept: PAIN MEDICINE | Facility: CLINIC | Age: 50
End: 2025-02-27
Payer: MEDICARE

## 2025-02-27 NOTE — TELEPHONE ENCOUNTER
Caller: Aj Plaza    Relationship: Self    Best call back number: [phone]    What was the call regarding: ATTEMPTED TO WT. PT STATES HE MISSED A CALL FROM DR MENESES'S OFFICE TODAY. NO MESSAGE IN CHART. PLEASE INVESTIGATE AND CONTACT PT.

## 2025-03-03 ENCOUNTER — TELEPHONE (OUTPATIENT)
Dept: PAIN MEDICINE | Facility: CLINIC | Age: 50
End: 2025-03-03
Payer: MEDICARE

## 2025-03-03 NOTE — TELEPHONE ENCOUNTER
"Pt called upset with the way his pain meds have been handled since Dr. Mathis' departure.  Also upset that he wasn't given more notice than 4 weeks before Dr. Mathis left [facility].  I apologized that he wasn't happy with the notice, and that his pain meds were being handled by another provider who was working in his best interest.  He stated he had \"contacted [employer] and the governor\" about these issues.    "

## (undated) DEVICE — BITEBLOCK ENDO W/STRAP 60F A/ LF DISP

## (undated) DEVICE — PK ENDO GI 50

## (undated) DEVICE — SINGLE-USE BIOPSY FORCEPS: Brand: RADIAL JAW 4